# Patient Record
Sex: FEMALE | Race: WHITE | Employment: OTHER | URBAN - METROPOLITAN AREA
[De-identification: names, ages, dates, MRNs, and addresses within clinical notes are randomized per-mention and may not be internally consistent; named-entity substitution may affect disease eponyms.]

---

## 2020-06-14 ENCOUNTER — HOSPITAL ENCOUNTER (OUTPATIENT)
Dept: MAMMOGRAPHY | Age: 60
Discharge: HOME | End: 2020-06-14
Attending: NURSE PRACTITIONER
Payer: COMMERCIAL

## 2020-06-14 DIAGNOSIS — Z12.39 BREAST SCREENING: ICD-10-CM

## 2020-06-14 PROCEDURE — 77067 SCR MAMMO BI INCL CAD: CPT

## 2020-06-18 NOTE — PROGRESS NOTES
NO SPECIFIC MAMMOGRAPHIC EVIDENCE FOR MALIGNANCY. FOLLOW UP BILATERAL SCREENING  MAMMOGRAPHY IS RECOMMENDED IN ONE YEAR.

## 2020-10-30 RX ORDER — SODIUM CHLORIDE 0.9 % (FLUSH) 0.9 %
5-40 SYRINGE (ML) INJECTION EVERY 8 HOURS
Status: CANCELLED | OUTPATIENT
Start: 2020-10-30

## 2020-10-30 RX ORDER — SODIUM CHLORIDE 0.9 % (FLUSH) 0.9 %
5-40 SYRINGE (ML) INJECTION AS NEEDED
Status: CANCELLED | OUTPATIENT
Start: 2020-10-30

## 2020-11-01 ENCOUNTER — ANESTHESIA EVENT (OUTPATIENT)
Dept: SURGERY | Age: 60
End: 2020-11-01
Payer: MEDICARE

## 2020-11-01 PROBLEM — S52.571A OTHER INTRAARTICULAR FRACTURE OF LOWER END OF RIGHT RADIUS, INITIAL ENCOUNTER FOR CLOSED FRACTURE: Status: ACTIVE | Noted: 2020-11-01

## 2020-11-02 ENCOUNTER — HOSPITAL ENCOUNTER (OUTPATIENT)
Age: 60
Setting detail: OUTPATIENT SURGERY
Discharge: HOME | End: 2020-11-02
Attending: ORTHOPAEDIC SURGERY | Admitting: ORTHOPAEDIC SURGERY
Payer: MEDICARE

## 2020-11-02 ENCOUNTER — ANESTHESIA (OUTPATIENT)
Dept: SURGERY | Age: 60
End: 2020-11-02
Payer: MEDICARE

## 2020-11-02 ENCOUNTER — APPOINTMENT (OUTPATIENT)
Dept: GENERAL RADIOLOGY | Age: 60
End: 2020-11-02
Attending: ORTHOPAEDIC SURGERY
Payer: MEDICARE

## 2020-11-02 VITALS
RESPIRATION RATE: 16 BRPM | DIASTOLIC BLOOD PRESSURE: 70 MMHG | HEART RATE: 62 BPM | SYSTOLIC BLOOD PRESSURE: 148 MMHG | WEIGHT: 172 LBS | TEMPERATURE: 98.3 F | BODY MASS INDEX: 32.5 KG/M2 | OXYGEN SATURATION: 95 %

## 2020-11-02 LAB
COVID-19 RAPID TEST, COVR: NOT DETECTED
SOURCE, COVRS: NORMAL

## 2020-11-02 PROCEDURE — 77030000032 HC CUF TRNQT ZIMM -B: Performed by: ORTHOPAEDIC SURGERY

## 2020-11-02 PROCEDURE — A4565 SLINGS: HCPCS | Performed by: ORTHOPAEDIC SURGERY

## 2020-11-02 PROCEDURE — 74011250636 HC RX REV CODE- 250/636: Performed by: STUDENT IN AN ORGANIZED HEALTH CARE EDUCATION/TRAINING PROGRAM

## 2020-11-02 PROCEDURE — 76942 ECHO GUIDE FOR BIOPSY: CPT | Performed by: ORTHOPAEDIC SURGERY

## 2020-11-02 PROCEDURE — 77030033138 HC SUT PGA STRATFX J&J -B: Performed by: ORTHOPAEDIC SURGERY

## 2020-11-02 PROCEDURE — 76060000032 HC ANESTHESIA 0.5 TO 1 HR: Performed by: ORTHOPAEDIC SURGERY

## 2020-11-02 PROCEDURE — 87635 SARS-COV-2 COVID-19 AMP PRB: CPT

## 2020-11-02 PROCEDURE — 74011000250 HC RX REV CODE- 250: Performed by: STUDENT IN AN ORGANIZED HEALTH CARE EDUCATION/TRAINING PROGRAM

## 2020-11-02 PROCEDURE — 77030021122 HC SPLNT MAT FST BSNM -A: Performed by: ORTHOPAEDIC SURGERY

## 2020-11-02 PROCEDURE — 76210000063 HC OR PH I REC FIRST 0.5 HR: Performed by: ORTHOPAEDIC SURGERY

## 2020-11-02 PROCEDURE — 77030010507 HC ADH SKN DERMBND J&J -B: Performed by: ORTHOPAEDIC SURGERY

## 2020-11-02 PROCEDURE — 2709999900 HC NON-CHARGEABLE SUPPLY: Performed by: ORTHOPAEDIC SURGERY

## 2020-11-02 PROCEDURE — 77030002916 HC SUT ETHLN J&J -A: Performed by: ORTHOPAEDIC SURGERY

## 2020-11-02 PROCEDURE — 77030031139 HC SUT VCRL2 J&J -A: Performed by: ORTHOPAEDIC SURGERY

## 2020-11-02 PROCEDURE — 76010010054 HC POST OP PAIN BLOCK: Performed by: ORTHOPAEDIC SURGERY

## 2020-11-02 PROCEDURE — C1713 ANCHOR/SCREW BN/BN,TIS/BN: HCPCS | Performed by: ORTHOPAEDIC SURGERY

## 2020-11-02 PROCEDURE — 76010000160 HC OR TIME 0.5 TO 1 HR INTENSV-TIER 1: Performed by: ORTHOPAEDIC SURGERY

## 2020-11-02 PROCEDURE — 77030000031 HC BIT DRL QC SYNT -C: Performed by: ORTHOPAEDIC SURGERY

## 2020-11-02 PROCEDURE — 73100 X-RAY EXAM OF WRIST: CPT

## 2020-11-02 PROCEDURE — 74011250636 HC RX REV CODE- 250/636

## 2020-11-02 PROCEDURE — 76210000020 HC REC RM PH II FIRST 0.5 HR: Performed by: ORTHOPAEDIC SURGERY

## 2020-11-02 PROCEDURE — 77030019908 HC STETH ESOPH SIMS -A: Performed by: STUDENT IN AN ORGANIZED HEALTH CARE EDUCATION/TRAINING PROGRAM

## 2020-11-02 PROCEDURE — 77030003602 HC NDL NRV BLK BBMI -B: Performed by: STUDENT IN AN ORGANIZED HEALTH CARE EDUCATION/TRAINING PROGRAM

## 2020-11-02 DEVICE — 2.4MM VA LOCKING SCREW STARDRIVE 16MM: Type: IMPLANTABLE DEVICE | Site: RADIUS | Status: FUNCTIONAL

## 2020-11-02 DEVICE — 2.4MM VA LOCKING SCREW STARDRIVE 18MM: Type: IMPLANTABLE DEVICE | Site: RADIUS | Status: FUNCTIONAL

## 2020-11-02 DEVICE — SCREW BNE L12MM DIA24MM CORT S STL ST T8 STARDRV RECESS: Type: IMPLANTABLE DEVICE | Site: RADIUS | Status: FUNCTIONAL

## 2020-11-02 DEVICE — 2.4MM VA-LCP 2-COL DSTL RAD PL NRW 6H HD/3H SHAFT/RT
Type: IMPLANTABLE DEVICE | Site: RADIUS | Status: FUNCTIONAL
Brand: VA-LCP

## 2020-11-02 RX ORDER — IBUPROFEN 200 MG
400 TABLET ORAL
COMMUNITY
End: 2020-12-22

## 2020-11-02 RX ORDER — LIDOCAINE HYDROCHLORIDE 20 MG/ML
INJECTION, SOLUTION EPIDURAL; INFILTRATION; INTRACAUDAL; PERINEURAL AS NEEDED
Status: DISCONTINUED | OUTPATIENT
Start: 2020-11-02 | End: 2020-11-02 | Stop reason: HOSPADM

## 2020-11-02 RX ORDER — SODIUM CHLORIDE, SODIUM LACTATE, POTASSIUM CHLORIDE, CALCIUM CHLORIDE 600; 310; 30; 20 MG/100ML; MG/100ML; MG/100ML; MG/100ML
INJECTION, SOLUTION INTRAVENOUS
Status: DISCONTINUED | OUTPATIENT
Start: 2020-11-02 | End: 2020-11-02 | Stop reason: HOSPADM

## 2020-11-02 RX ORDER — SODIUM CHLORIDE 0.9 % (FLUSH) 0.9 %
5-40 SYRINGE (ML) INJECTION AS NEEDED
Status: DISCONTINUED | OUTPATIENT
Start: 2020-11-02 | End: 2020-11-02 | Stop reason: HOSPADM

## 2020-11-02 RX ORDER — SODIUM CHLORIDE 0.9 % (FLUSH) 0.9 %
5-40 SYRINGE (ML) INJECTION EVERY 8 HOURS
Status: DISCONTINUED | OUTPATIENT
Start: 2020-11-02 | End: 2020-11-02 | Stop reason: HOSPADM

## 2020-11-02 RX ORDER — HYDROMORPHONE HYDROCHLORIDE 2 MG/ML
0.5 INJECTION, SOLUTION INTRAMUSCULAR; INTRAVENOUS; SUBCUTANEOUS
Status: DISCONTINUED | OUTPATIENT
Start: 2020-11-02 | End: 2020-11-02 | Stop reason: HOSPADM

## 2020-11-02 RX ORDER — DEXAMETHASONE SODIUM PHOSPHATE 4 MG/ML
INJECTION, SOLUTION INTRA-ARTICULAR; INTRALESIONAL; INTRAMUSCULAR; INTRAVENOUS; SOFT TISSUE
Status: COMPLETED | OUTPATIENT
Start: 2020-11-02 | End: 2020-11-02

## 2020-11-02 RX ORDER — CEFAZOLIN SODIUM/WATER 2 G/20 ML
2 SYRINGE (ML) INTRAVENOUS ONCE
Status: COMPLETED | OUTPATIENT
Start: 2020-11-02 | End: 2020-11-02

## 2020-11-02 RX ORDER — LIDOCAINE HYDROCHLORIDE 10 MG/ML
0.1 INJECTION INFILTRATION; PERINEURAL AS NEEDED
Status: DISCONTINUED | OUTPATIENT
Start: 2020-11-02 | End: 2020-11-02 | Stop reason: HOSPADM

## 2020-11-02 RX ORDER — NALOXONE HYDROCHLORIDE 0.4 MG/ML
0.1 INJECTION, SOLUTION INTRAMUSCULAR; INTRAVENOUS; SUBCUTANEOUS AS NEEDED
Status: DISCONTINUED | OUTPATIENT
Start: 2020-11-02 | End: 2020-11-02 | Stop reason: HOSPADM

## 2020-11-02 RX ORDER — FLUMAZENIL 0.1 MG/ML
0.2 INJECTION INTRAVENOUS
Status: DISCONTINUED | OUTPATIENT
Start: 2020-11-02 | End: 2020-11-02 | Stop reason: HOSPADM

## 2020-11-02 RX ORDER — NALOXONE HYDROCHLORIDE 0.4 MG/ML
0.1 INJECTION, SOLUTION INTRAMUSCULAR; INTRAVENOUS; SUBCUTANEOUS
Status: DISCONTINUED | OUTPATIENT
Start: 2020-11-02 | End: 2020-11-02 | Stop reason: HOSPADM

## 2020-11-02 RX ORDER — PROPOFOL 10 MG/ML
INJECTION, EMULSION INTRAVENOUS
Status: DISCONTINUED | OUTPATIENT
Start: 2020-11-02 | End: 2020-11-02 | Stop reason: HOSPADM

## 2020-11-02 RX ORDER — DIPHENHYDRAMINE HYDROCHLORIDE 50 MG/ML
12.5 INJECTION, SOLUTION INTRAMUSCULAR; INTRAVENOUS
Status: DISCONTINUED | OUTPATIENT
Start: 2020-11-02 | End: 2020-11-02 | Stop reason: HOSPADM

## 2020-11-02 RX ORDER — SODIUM CHLORIDE, SODIUM LACTATE, POTASSIUM CHLORIDE, CALCIUM CHLORIDE 600; 310; 30; 20 MG/100ML; MG/100ML; MG/100ML; MG/100ML
100 INJECTION, SOLUTION INTRAVENOUS CONTINUOUS
Status: DISCONTINUED | OUTPATIENT
Start: 2020-11-02 | End: 2020-11-02 | Stop reason: HOSPADM

## 2020-11-02 RX ORDER — ROPIVACAINE HYDROCHLORIDE 5 MG/ML
INJECTION, SOLUTION EPIDURAL; INFILTRATION; PERINEURAL
Status: COMPLETED | OUTPATIENT
Start: 2020-11-02 | End: 2020-11-02

## 2020-11-02 RX ORDER — PROPOFOL 10 MG/ML
INJECTION, EMULSION INTRAVENOUS AS NEEDED
Status: DISCONTINUED | OUTPATIENT
Start: 2020-11-02 | End: 2020-11-02 | Stop reason: HOSPADM

## 2020-11-02 RX ORDER — OXYCODONE HYDROCHLORIDE 5 MG/1
5 TABLET ORAL
Status: DISCONTINUED | OUTPATIENT
Start: 2020-11-02 | End: 2020-11-02 | Stop reason: HOSPADM

## 2020-11-02 RX ORDER — FENTANYL CITRATE 50 UG/ML
100 INJECTION, SOLUTION INTRAMUSCULAR; INTRAVENOUS ONCE
Status: COMPLETED | OUTPATIENT
Start: 2020-11-02 | End: 2020-11-02

## 2020-11-02 RX ORDER — MIDAZOLAM HYDROCHLORIDE 1 MG/ML
2 INJECTION, SOLUTION INTRAMUSCULAR; INTRAVENOUS ONCE
Status: COMPLETED | OUTPATIENT
Start: 2020-11-02 | End: 2020-11-02

## 2020-11-02 RX ADMIN — SODIUM CHLORIDE, SODIUM LACTATE, POTASSIUM CHLORIDE, AND CALCIUM CHLORIDE: 600; 310; 30; 20 INJECTION, SOLUTION INTRAVENOUS at 12:43

## 2020-11-02 RX ADMIN — MIDAZOLAM 2 MG: 1 INJECTION INTRAMUSCULAR; INTRAVENOUS at 11:44

## 2020-11-02 RX ADMIN — PROPOFOL 100 MCG/KG/MIN: 10 INJECTION, EMULSION INTRAVENOUS at 12:49

## 2020-11-02 RX ADMIN — LIDOCAINE HYDROCHLORIDE 80 MG: 20 INJECTION, SOLUTION EPIDURAL; INFILTRATION; INTRACAUDAL; PERINEURAL at 12:49

## 2020-11-02 RX ADMIN — FENTANYL CITRATE 50 MCG: 50 INJECTION, SOLUTION INTRAMUSCULAR; INTRAVENOUS at 11:44

## 2020-11-02 RX ADMIN — DEXAMETHASONE SODIUM PHOSPHATE 4 MG: 4 INJECTION, SOLUTION INTRAMUSCULAR; INTRAVENOUS at 11:48

## 2020-11-02 RX ADMIN — ROPIVACAINE HYDROCHLORIDE 30 ML: 150 INJECTION, SOLUTION EPIDURAL; INFILTRATION; PERINEURAL at 11:48

## 2020-11-02 RX ADMIN — Medication 2 G: at 12:50

## 2020-11-02 RX ADMIN — PROPOFOL 50 MG: 10 INJECTION, EMULSION INTRAVENOUS at 12:49

## 2020-11-02 RX ADMIN — SODIUM CHLORIDE, SODIUM LACTATE, POTASSIUM CHLORIDE, AND CALCIUM CHLORIDE 100 ML/HR: 600; 310; 30; 20 INJECTION, SOLUTION INTRAVENOUS at 11:45

## 2020-11-02 NOTE — H&P
History and Physical    Subjective: Lupe Alvarado is a 60 y.o. sustained a right displaced distal radius fracture last week, she was seen by my partner Dr Jaime Felder who recommended surgery but referred her to me for the surgery. Patient discussed risks and benefits of surgery with me over the phone and elected to proceed with surgery. Past Medical History:   Diagnosis Date    Arthritis     back, shoulders, hand    Depression     anxiety    GERD (gastroesophageal reflux disease)     medications prn    History of degenerative disc disease       Past Surgical History:   Procedure Laterality Date    HX CHOLECYSTECTOMY      HX COLONOSCOPY      HX GASTRIC BYPASS  2012    HX HERNIA REPAIR  2016 August Thursday 19 2016;     HX ORTHOPAEDIC Left     shoulder surgery     HX ORTHOPAEDIC Right     shoulder surgery x 3    HX OTHER SURGICAL      patient reports spinal ablation      Family History   Problem Relation Age of Onset    Diabetes Mother     Stroke Mother     Breast Cancer Mother 72    Diabetes Father     Stroke Father     Pulmonary Embolism Sister     Breast Cancer Maternal Grandmother 76    Breast Cancer Maternal Aunt 79    Breast Cancer Other       Social History     Tobacco Use    Smoking status: Never Smoker    Smokeless tobacco: Never Used    Tobacco comment: 30 years ago   Substance Use Topics    Alcohol use: No       Prior to Admission medications    Medication Sig Start Date End Date Taking? Authorizing Provider   montelukast (SINGULAIR) 10 mg tablet Take 1 Tab by mouth daily. 9/22/20   Chica Sims NP   citalopram (CELEXA) 20 mg tablet Take 1 Tab by mouth daily. Patient taking differently: Take 20 mg by mouth every evening. 9/22/20   Chica Sims NP   meloxicam (MOBIC) 15 mg tablet Take 1 Tab by mouth daily. 7/2/20   Chica Sims NP   fluticasone propionate (FLONASE) 50 mcg/actuation nasal spray 2 Sprays by Both Nostrils route daily.  7/2/20 Chica Sims NP   topiramate (TOPAMAX) 25 mg tablet 1 tab po bid  Patient taking differently: 25 mg nightly. 1 tab po bid 7/2/20   Chica Sims NP   ondansetron hcl (ZOFRAN) 4 mg tablet Take 1 Tab by mouth every eight (8) hours as needed for Nausea or Nausea or Vomiting. 7/2/20   Chica Sims NP   albuterol (PROVENTIL HFA, VENTOLIN HFA, PROAIR HFA) 90 mcg/actuation inhaler Take 1 Puff by inhalation every six (6) hours as needed for Wheezing. 2/18/20   Chica Sims NP   albuterol (PROVENTIL VENTOLIN) 2.5 mg /3 mL (0.083 %) nebu 3 mL by Nebulization route every six (6) hours as needed for Wheezing. 2/18/20   Chica Sims NP   tiZANidine (ZANAFLEX) 4 mg tablet Take 4 mg by mouth. Just for ablation 10/16/19   Provider, Historical   omeprazole (PRILOSEC) 20 mg capsule Take 1 Cap by mouth daily. 12/4/19   Chica Sims NP   budesonide-formoterol (SYMBICORT) 80-4.5 mcg/actuation HFAA Take 2 Puffs by inhalation two (2) times a day. 12/4/19   Chica Sims NP   albuterol (PROVENTIL HFA, VENTOLIN HFA, PROAIR HFA) 90 mcg/actuation inhaler Take 2 Puffs by inhalation every six (6) hours as needed for Wheezing. 12/4/19   Chica Sims NP   metFORMIN (GLUCOPHAGE) 500 mg tablet Take 1 Tab by mouth daily (with breakfast). Patient taking differently: Take 500 mg by mouth daily (with breakfast). Only takes for an lumbar and thoracic ablation. Takes 1 week before and 1 week after 12/4/19   Chica Sims NP   gabapentin (NEURONTIN) 300 mg capsule Take 300 mg by mouth daily. Provider, Historical   HYDROcodone-acetaminophen (NORCO)  mg tablet Take 1 Tab by mouth every six (6) hours as needed for Pain. Provider, Historical   Nebulizer & Compressor machine Pt can use 4 times a day as needed. 5/29/18   Gabe Sims, HITESH   Nebulizer Accessories kit Pt can use 4 times daily as needed.  5/29/18   Gabe Sims, NP cholecalciferol (VITAMIN D3) 1,000 unit tablet Take 1,000 Units by mouth daily. Provider, Historical   miscellaneous medical supply misc Replacement of TENS unit pads as needed. 1/15/16   Provider, Historical   multivitamin with iron tablet Take 1 Tab by mouth. 6/12/15   Provider, Historical   thiamine (B-1) 100 mg tablet Take 100 mg by mouth. Provider, Historical     Allergies   Allergen Reactions    Tylox [Oxycodone-Acetaminophen] Rash        Review of Systems:  A comprehensive review of systems was negative except for that written in the History of Present Illness. Objective: Intake and Output:    No intake/output data recorded. No intake/output data recorded. Physical Exam:   NAD, heart with RRR, lungs clear to auscultation    Right wrist with normal cap refill, normal sensation in all nerve distributions, moderate swelling and limited motion due to pain      Data Review:   No results found for this or any previous visit (from the past 24 hour(s)). \Imaging: Right distal radius intraarticular fracture with dorsal tilt of 20 degrees.     Assessment:     Active Problems:    Other intraarticular fracture of lower end of right radius, initial encounter for closed fracture (11/1/2020)        Plan:     Plan for right distal radius open reduction internal fixation    Wanda Beckett MD, PhD  Orthopaedic Surgery  11/02/20  12:32 PM

## 2020-11-02 NOTE — OP NOTES
OPERATIVE NOTE    Sadia Briggs   [id number]  11/2/2020    PRE-OP DIAGNOSIS: Oth intartic fracture of lower end of right radius, init [S52.571A]       POST-OP DIAGNOSIS: Oth intartic fracture of lower end of right radius, init [S52.571A]    LATERALITY: Right    PROCEDURES PERFORMED:    Open treatment of Right intra-articular distal radius fracture with internal fixation of two fragments CPT 37881      SURGEON:  Yaneth Craft MD    IMPLANTS:  Implant Name Type Inv. Item Serial No.  Lot No. LRB No. Used Action   PLATE RAD DSTL 2-C 6H HD/3H RT -- NRW [employer] - [id number]  PLATE RAD DSTL 2-C 6H HD/3H RT -- [address] [id number] Right 1 Implanted   SCR VA LCK ST STRDRV 2.4X18MM --  - [id number]  SCR VA LCK ST STRDRV 2.4X18MM --   [address] [id number] Right 3 Implanted   SCR BNE CRTX ST T8 2.4X12MM SS --  - [id number]  SCR BNE CRTX ST T8 2.4X12MM SS --   [employer] [place] [id number] Right 3 Implanted   SCR VA LCK ST STRDRV 2.4X16MM --  - [id number]  SCR VA LCK ST STRDRV 2.4X16MM --   [address] [id number] Right 2 Implanted       Procedure(s):  RIGHT DISTAL RADIUS OPEN REDUCTION INTERNAL FIXATION PLEXUS    Surgeon(s):  Juanito Coy MD    Procedure(s):  RIGHT DISTAL RADIUS OPEN REDUCTION INTERNAL FIXATION PLEXUS    ANESTHESIA: Regional    ESTIMATED BLOOD LOSS: Minimal    COMPLICATIONS: None    TOURNIQUET TIME:   Total Tourniquet Time Documented:  Upper Arm (Right) - 35 minutes  Total: Upper Arm (Right) - 35 minutes      INDICATION FOR PROCEDURE:  Sadia Briggs sustained the above mentioned injuries as a result of a fall. Surgical and non-surgical treatment options were discussed with the patient and their family, as well as the risk and benefits of each option. Together, we decided to proceed with surgical management of their fracture.   Specific to this treatment plan, we discussed in detail surgical risks including scar, pain, bleeding, infection, anesthetic risks, neurovascular injury, nonunion, malunion, hardware loosening or failure, need for further surgery,  weakness, stiffness, risk of death and potential risk of other unforseen complication. The patient did consent to the procedure after discussion of the risks and benefits. DESCRIPTION OF PROCEDURE:  The patient was identified in the holding room. The Right wrist was marked and confirmed as the correct operative site. They were then brought to the OR and general anesthesia was induced. They were transferred onto the OR table in the supine position. All bony prominences were well padded. SCDs were placed on the bilateral legs throughout the case. A timeout was performed, verifying the correct patient, the correct side being the Right side and the correct procedure. Antibiotics were then administered, and were redosed during the procedure as needed at indicated intervals. A non-sterile tourniquet was placed on the Right arm    The Right upper extremity and shoulder girdle was pre scrubbed and then prepped and draped in routine sterile fashion. An incisional timeout was performed re-confirming the correct patient, surgical site and procedure, as well as verifying antibiotics. A volar FCR approach was utilized. Dissection was taken down through skin and subcutaneous tissue. Hemostasis was achieved with bipolar cautery. The FCR sheath was incised and it was retracted ulnarly. The FCR subsheath was incised. The pronator quadratus was then sharply released from the radius in an L-shaped fashion and was retracted ulnarly to expose the distal radius. Dissection was taken down to bone. The fracture was identified and inspected. Fracture was reduced with manipulation and placement of a [place] elevator into the fracture to unhinge the volar cortex. Reduction was confirmed with fluoroscopy. A distal radius plate was chosen and placed along the volar surface.   Once plate position was confirmed, the plate was secured to the bone with a nonlocking screw into the oblong hole. The distal row of screw holes were filled with locking screws of measured length after unicortical drilling. The plate was secured at the shaft with two additional screws, one more nonlocking and one locking screw. AP and lateral x-rays were obtained, showing adequate fracture reduction with restoration of height, palmar tilt and radial inclination. Articular wrist view was used after placement of each distal screw to demonstrated that all screws were outside of the joint. Final x-rays were obtained. These showed adequate reduction of the fracture, as well as adequate plate and screw placement and length. The tourniquet was deflated and hemostasis was ensured. The wounds were then thoroughly irrigated and closed in layered fashion with 4-0 monocryl and 4-0 stratafix. A sterile dressing was applied followed by a Volar splint    The patient was awakened and taken to PACU in stable condition. All sponge and needle counts were correct at the end of the case. I was present and scrubbed for the entire procedure.        DISPOSITION : Home    MECHANICAL VTE (DVT) PROPHYLAXIS: sequential compression devices    CHEMICAL VTE (DVT) PROPHYLAXIS: None warranted for this case    WEIGHT BEARING STATUS:   NWB on the Right upper extremity    FOLLOW-UP: in 10-14 days for suture removal.       [place] MD, PhD  Orthopaedic Surgery  11/02/20  1:36 PM

## 2020-11-02 NOTE — ANESTHESIA PROCEDURE NOTES
Peripheral Block    Start time: 11/2/2020 11:42 AM  End time: 11/2/2020 11:48 AM  Performed by: Bogdan Tong MD  Authorized by: Bogdan Tong MD       Pre-procedure:    Indications: at surgeon's request and post-op pain management    Preanesthetic Checklist: patient identified, risks and benefits discussed, site marked, timeout performed, anesthesia consent given and patient being monitored    Timeout Time: 11:42          Block Type:   Block Type:  Supraclavicular  Laterality:  Right  Monitoring:  Standard ASA monitoring, responsive to questions, oxygen, continuous pulse ox, frequent vital sign checks and heart rate  Injection Technique:  Single shot  Procedures: ultrasound guided    Patient Position: supine  Prep: chlorhexidine    Location:  Supraclavicular  Needle Type:  Stimuplex  Needle Gauge:  20 G  Needle Localization:  Ultrasound guidance  Medication Injected:  Ropivacaine (PF) (NAROPIN)(0.5%) 5 mg/mL injection, 30 mL  dexamethasone (DECADRON) 4 mg/mL injection, 4 mg  Med Admin Time: 11/2/2020 11:48 AM    Assessment:  Number of attempts:  1  Injection Assessment:  Incremental injection every 5 mL, negative aspiration for CSF, no paresthesia, ultrasound image on chart, local visualized surrounding nerve on ultrasound, negative aspiration for blood and no intravascular symptoms  Patient tolerance:  Patient tolerated the procedure well with no immediate complications

## 2020-11-02 NOTE — DISCHARGE INSTRUCTIONS
Postoperative  Instructions:      Weightbearing or Lifting: You  are  not  allowed  to  lift  any  weight  or  bear  any  weight  on  the  surgical  extremity  until  cleared  by  your  surgeon. Dressing  instructions:    Keep  your  dressing  and/or  splint  clean  and  dry  at  all  times. It  will  be  removed  at  your  first  post-operative  appointment. Your  stitches  will  be  removed  at  this  visit. Showering  Instructions:  May  shower  But keep surgical dressing clean and dry until removed as explained above. Pain  Control:  - You  have  been  given  a  prescription  to  be  taken  as  directed  for  post-operative  pain  control. In  addition,  elevate  the  operative  extremity  above  the  heart  at  all  times  to  prevent  swelling  and  throbbing  pain. - If you develop constipation while taking narcotic pain medications (Norco, Hydrocodone, Percocet, Oxycodone, Dilaudid, Hydromorphone) take  over-the-counter  Colace,  100mg  by  mouth  twice  a  Day. - Nausea  is  a  common  side  effect  of  many  pain  medications. You  will  want  to  eat something  before  taking  your  pain  medicine  to  help  prevent  Nausea. - If  you  are  taking  a  prescription  pain  medication  that  contains  acetaminophen,  we  recommend  that  you  do  not  take  additional  over  the  counter  acetaminophen  (Tylenol®). Other  pain  relieving  options:   - Using  a  cold  pack  to  ice  the  affected  area  a  few  times  a  day  (15  to  20  minutes  at  a  time)  can  help  to  relieve  pain,  reduce  swelling  and  bruising.      - Elevation  of  the  affected  area  can  also  help  to  reduce  pain  and  swelling. Did  you  receive  a  nerve  Block? A  nerve  block  can  provide  pain  relief  for  one  hour  to  two  days  after  your  surgery.   As  long  as  the  nerve  block  is  working,  you  will  experience  little  or  no  sensation  in  the  area  the surgeon  operated  on. As  the  nerve  block  wears  off,  you  will  begin  to  experience  pain  or  discomfort. It  is  very  important  that  you  begin  taking  your  prescribed  pain  medication  before  the  nerve  block  fully  wears  off. The first sign that the nerve block is wearing off is tingling in your fingers. Treating  your  pain  at  the  first  sign  of  the  block  wearing  off  will  ensure  your  pain  is  better  controlled  and  more  tolerable  when  full-sensation  returns. Do  not  wait  until  the  pain  is  intolerable,  as  the  medicine  will  be  less  effective. It  is  better  to  treat  pain  in  advance  than  to  try  and  catch  up. General  Anesthesia or Sedation:      If  you  did  not  receive  a  nerve  block  during  your  surgery,  you  will  need  to  start  taking  your  pain  medication  shortly  after  your  surgery  and  should  continue  to  do  so  as  prescribed  by  your  surgeon. Please  call  [phone]  with any concern and ask to speak with Curtis Lehman. Concerning problems include:      -  Excessive  redness  of  the  incisions      -  Drainage  for  more  than  2  Days after surgery or any foul smelling drainage  -  Fever  of  more  than  101.5  F      Please  call  [phone]  if  you  do  not  receive  or  are  unsure  of  your  first  follow-up  appointment. You  should  see  the  doctor  10-14  days  after  your  Surgery. Thank you for choosing me and [address] for your care. I will go above and beyond to ensure you receive the best care possible. Ridge Fall MD, PhD        Harsha Tai Call your doctor if pain is NOT relieved by medication.      Excessive bleeding that does not stop after holding pressure over the area  · Temperature of 101 degrees F or above  · Excessive redness, swelling or bruising, and/ or green or yellow, smelly discharge from incision      TYPICAL SIDE EFFECTS OF PAIN MEDICATION:     Constipation: Drink lots of fluids. Over the counter stool softener if needed.    Nausea: Take pain medication with food. Call your doctor with persistent nausea. ACTIVITY  · As tolerated and as directed by your doctor. · Bathe or shower as directed by your doctor. DIET  · Day of surgery: Clear liquids until no nausea or vomiting; small portion, light diet [employer] foods (ex: baked chicken, plain rice, grits, scrambled eggs, toast). Nothing greasy, fried or spicy today. · Advance to regular diet on second day, unless your doctor orders otherwise. · If nausea and vomiting continues, call your doctor. PAIN  · Take pain medication as directed by your doctor. · DO NOT take aspirin or blood thinners unless directed by your doctor. AFTER ANESTHESIA   · For the first 24 hours: DO NOT Drive, Drink alcoholic beverages, or Make important decisions. · Be aware of dizziness following anesthesia and while taking pain medication. DISCHARGE SUMMARY from Nurse    PATIENT INSTRUCTIONS:    After general anesthesia or intravenous sedation, for 24 hours or while taking prescription Narcotics:  · Limit your activities  · Do not drive and operate hazardous machinery  · Do not make important personal or business decisions  · Do  not drink alcoholic beverages  · If you have not urinated within 8 hours after discharge, please contact your surgeon on call. *  Please give a list of your current medications to your Primary Care Provider. *  Please update this list whenever your medications are discontinued, doses are      changed, or new medications (including over-the-counter products) are added. *  Please carry medication information at all times in case of emergency situations. Preventing Infection at Home  We care about preventing infection and avoiding the spread of germs - not only when you are in the hospital but also when you return home.  When you return home from the hospital, its important to take the following steps to help prevent infection and avoid spreading germs that could infect you and others. Ask everyone in your home to follow these guidelines, too. Clean Your Hands  · Clean your hands whenever your hands are visibly dirty, before you eat, before or after touching your mouth, nose or eyes, and before preparing food. Clean them after contact with body fluids, using the restroom, touching animals or changing diapers. · When washing hands, wet them with warm water and work up a lather. Rub hands for at least 15 seconds, then rinse them and pat them dry with a clean towel or paper towel. · When using hand sanitizers, it should take about 15 seconds to rub your hands dry. If not, you probably didnt apply enough . Cover Your Sneeze or Cough  Germs are released into the air whenever you sneeze or cough. To prevent the spread of infection:  · Turn away from other people before coughing or sneezing. · Cover your mouth or nose with a tissue when you cough or sneeze. Put the tissue in the trash. · If you dont have a tissue, cough or sneeze into your upper sleeve, not your hands. · Always clean your hands after coughing or sneezing. Care for Wounds  Your skin is your bodys first line of defense against germs, but an open wound leaves an easy way for germs to enter your body. To prevent infection:  · Clean your hands before and after changing wound dressings, and wear gloves to change dressings if recommended by your doctor. · Take special care with IV lines or other devices inserted into the body. If you must touch them, clean your hands first.  · Follow any specific instructions from your doctor to care for your wounds. Contact your doctor if you experience any signs of infection, such as fever or increased redness at the surgical or wound site.     Keep a Clean Home  · Clean or wipe commonly touched hard surfaces like door handles, sinks, tabletops, phones and TV remotes. · Use products labeled disinfectant to kill harmful bacteria and viruses. · Use a clean cloth or paper towel to clean and dry surfaces. Wiping surfaces with a dirty dishcloth, sponge or towel will only spread germs. · Never share toothbrushes, laguerre, drinking glasses, utensils, razor blades, face cloths or bath towels to avoid spreading germs. · Be sure that the linens that you sleep on are clean. · Keep pets away from wounds and wash your hands after touching pets, their toys or bedding. We care about you and your health. Remember, preventing infections is a team effort between you, your family, friends and health care providers. These are general instructions for a healthy lifestyle:    No smoking/ No tobacco products/ Avoid exposure to second hand smoke    Surgeon General's Warning:  Quitting smoking now greatly reduces serious risk to your health. Obesity, smoking, and sedentary lifestyle greatly increases your risk for illness    A healthy diet, regular physical exercise & weight monitoring are important for maintaining a healthy lifestyle    You may be retaining fluid if you have a history of heart failure or if you experience any of the following symptoms:  Weight gain of 3 pounds or more overnight or 5 pounds in a week, increased swelling in our hands or feet or shortness of breath while lying flat in bed. Please call your doctor as soon as you notice any of these symptoms; do not wait until your next office visit. Recognize signs and symptoms of STROKE:    F-face looks uneven    A-arms unable to move or move unevenly    S-speech slurred or non-existent    T-time-call 911 as soon as signs and symptoms begin-DO NOT go       Back to bed or wait to see if you get better-TIME IS BRAIN.     Advance Care Planning  People with COVID-19 may have no symptoms, mild symptoms, such as fever, cough, and shortness of breath or they may have more severe illness, developing severe and fatal pneumonia. As a result, Advance Care Planning with attention to naming a health care decision maker (someone you trust to make healthcare decisions for you if you could not speak for yourself) and sharing other health care preferences is important BEFORE a possible health crisis. Please contact your Primary Care Provider to discuss Advance Care Planning. Preventing the Spread of Coronavirus Disease 2019 in Homes and Residential Communities  For the most recent information go to [email or web address]    Prevention steps for People with confirmed or suspected COVID-19 (including persons under investigation) who do not need to be hospitalized  and   People with confirmed COVID-19 who were hospitalized and determined to be medically stable to go home    Your healthcare provider and public health staff will evaluate whether you can be cared for at home. If it is determined that you do not need to be hospitalized and can be isolated at home, you will be monitored by staff from your local or state health department. You should follow the prevention steps below until a healthcare provider or local or state health department says you can return to your normal activities. Stay home except to get medical care  People who are mildly ill with COVID-19 are able to isolate at home during their illness. You should restrict activities outside your home, except for getting medical care. Do not go to work, school, or public areas. Avoid using public transportation, ride-sharing, or taxis. Separate yourself from other people and animals in your home  People: As much as possible, you should stay in a specific room and away from other people in your home. Also, you should use a separate bathroom, if available. Animals:  You should restrict contact with pets and other animals while you are sick with COVID-19, just like you would around other people. Although there have not been reports of pets or other animals becoming sick with COVID-19, it is still recommended that people sick with COVID-19 limit contact with animals until more information is known about the virus. When possible, have another member of your household care for your animals while you are sick. If you are sick with COVID-19, avoid contact with your pet, including petting, snuggling, being kissed or licked, and sharing food. If you must care for your pet or be around animals while you are sick, wash your hands before and after you interact with pets and wear a facemask. Call ahead before visiting your doctor  If you have a medical appointment, call the healthcare provider and tell them that you have or may have COVID-19. This will help the healthcare providers office take steps to keep other people from getting infected or exposed. Wear a facemask  You should wear a facemask when you are around other people (e.g., sharing a room or vehicle) or pets and before you enter a healthcare providers office. If you are not able to wear a facemask (for example, because it causes trouble breathing), then people who live with you should not stay in the same room with you, or they should wear a facemask if they enter your room. Cover your coughs and sneezes  Cover your mouth and nose with a tissue when you cough or sneeze. Throw used tissues in a lined trash can. Immediately wash your hands with soap and water for at least 20 seconds or, if soap and water are not available, clean your hands with an alcohol-based hand  that contains at least 60% alcohol. Clean your hands often  Wash your hands often with soap and water for at least 20 seconds, especially after blowing your nose, coughing, or sneezing; going to the bathroom; and before eating or preparing food.  If soap and water are not readily available, use an alcohol-based hand  with at least 60% alcohol, covering all surfaces of your hands and rubbing them together until they feel dry. Soap and water are the best option if hands are visibly dirty. Avoid touching your eyes, nose, and mouth with unwashed hands. Avoid sharing personal household items  You should not share dishes, drinking glasses, cups, eating utensils, towels, or bedding with other people or pets in your home. After using these items, they should be washed thoroughly with soap and water. Clean all high-touch surfaces everyday  High touch surfaces include counters, tabletops, doorknobs, bathroom fixtures, toilets, phones, keyboards, tablets, and bedside tables. Also, clean any surfaces that may have blood, stool, or body fluids on them. Use a household cleaning spray or wipe, according to the label instructions. Labels contain instructions for safe and effective use of the cleaning product including precautions you should take when applying the product, such as wearing gloves and making sure you have good ventilation during use of the product. Monitor your symptoms  Seek prompt medical attention if your illness is worsening (e.g., difficulty breathing). Before seeking care, call your healthcare provider and tell them that you have, or are being evaluated for, COVID-19. Put on a facemask before you enter the facility. These steps will help the healthcare providers office to keep other people in the office or waiting room from getting infected or exposed. Ask your healthcare provider to call the local or state health department. Persons who are placed under active monitoring or facilitated self-monitoring should follow instructions provided by their local health department or occupational health professionals, as appropriate. When working with your local health department check their available hours. If you have a medical emergency and need to call 911, notify the dispatch personnel that you have, or are being evaluated for COVID-19.  If possible, put on a facemask before emergency medical services arrive. Discontinuing home isolation  Patients with confirmed COVID-19 should remain under home isolation precautions until the risk of secondary transmission to others is thought to be low. The decision to discontinue home isolation precautions should be made on a case-by-case basis, in consultation with healthcare providers and state and local health departments.

## 2020-11-02 NOTE — ANESTHESIA PREPROCEDURE EVALUATION
Relevant Problems   No relevant active problems       Anesthetic History   No history of anesthetic complications            Review of Systems / Medical History  Patient summary reviewed, nursing notes reviewed and pertinent labs reviewed    Pulmonary  Within defined limits                 Neuro/Psych         Psychiatric history     Cardiovascular  Within defined limits                Exercise tolerance: >4 METS     GI/Hepatic/Renal     GERD: well controlled           Endo/Other        Obesity and arthritis    Comments: S/p gastric bypass. Down 94 pounds.  Other Findings              Physical Exam    Airway  Mallampati: II  TM Distance: 4 - 6 cm  Neck ROM: normal range of motion   Mouth opening: Normal     Cardiovascular  Regular rate and rhythm,  S1 and S2 normal,  no murmur, click, rub, or gallop             Dental    Dentition: Upper partial plate     Pulmonary  Breath sounds clear to auscultation               Abdominal         Other Findings            Anesthetic Plan    ASA: 2  Anesthesia type: total IV anesthesia      Post-op pain plan if not by surgeon: peripheral nerve block single    Induction: Intravenous  Anesthetic plan and risks discussed with: Patient and Family

## 2020-11-02 NOTE — ANESTHESIA POSTPROCEDURE EVALUATION
Procedure(s):  RIGHT DISTAL RADIUS OPEN REDUCTION INTERNAL FIXATION PLEXUS.     general    Anesthesia Post Evaluation      Multimodal analgesia: multimodal analgesia used between 6 hours prior to anesthesia start to PACU discharge  Patient location during evaluation: PACU  Patient participation: complete - patient participated  Level of consciousness: awake and alert  Pain management: adequate  Airway patency: patent  Anesthetic complications: no  Cardiovascular status: acceptable  Respiratory status: acceptable  Hydration status: acceptable  Post anesthesia nausea and vomiting:  controlled  Final Post Anesthesia Temperature Assessment:  Normothermia (36.0-37.5 degrees C)      INITIAL Post-op Vital signs:   Vitals Value Taken Time   /66 11/2/2020  1:47 PM   Temp 36.5 °C (97.7 °F) 11/2/2020  1:37 PM   Pulse 59 11/2/2020  1:47 PM   Resp 14 11/2/2020  1:47 PM   SpO2 94 % 11/2/2020  1:47 PM

## 2020-11-30 ENCOUNTER — HOSPITAL ENCOUNTER (OUTPATIENT)
Dept: PHYSICAL THERAPY | Age: 60
Discharge: HOME | End: 2020-11-30
Payer: MEDICARE

## 2020-11-30 PROCEDURE — 97165 OT EVAL LOW COMPLEX 30 MIN: CPT

## 2020-11-30 PROCEDURE — 97110 THERAPEUTIC EXERCISES: CPT

## 2020-11-30 NOTE — THERAPY EVALUATION
Pierre Drew  : 1960  Primary: Yasmine Isbell Medicare Advantage  Secondary:  [address] [place]  [facility], [address]  [phone]   [phone]       OUTPATIENT OCCUPATIONAL [place] Assessment 2020   ICD-10: Treatment Diagnosis: Pain in right wrist (M25.531)    Precautions/Allergies:   Tylox [oxycodone-acetaminophen]   TREATMENT PLAN:  Effective Dates: 2020 TO 2021 (90 days). Frequency/Duration: 1-2 times a week for 90 Day(s) MEDICAL/REFERRING DIAGNOSIS:  Other intraarticular fracture of lower end of right radius, initial encounter for closed fracture [S52.571A]   DATE OF ONSET: 10/29/20  REFERRING PHYSICIAN: Rui Kaur MD MD Orders: OT evaluate and treat. Return MD Appointment: Pending. INITIAL ASSESSMENT:   Ms. Drew presents with decreased functional use, strength and range of motion of her right hand, wrist and upper extremity that is affecting her independence with activities of daily living and ability to perform job tasks. I feel that Ms. Drew will benefit from skilled occupational therapy to maximize the functional use of her hand, wrist and upper extremity in daily activities and work tasks. PROBLEM LIST (Impacting functional limitations):  1. Pain in R wrist.  2. Decreased motion in R wrist/hand/forearm. 3. Decreased strength in R wrist/hand/forearm. INTERVENTIONS PLANNED: (Treatment may consist of any combination of the following)  1. Modalities as warranted  2. Therapeutic exercise including a home exercise program.  3. Manual therapy. 4. Therapeutic activities. 5. Orthotic management and training as warranted. GOALS: (Goals have been discussed and agreed upon with patient.)  Short-Term Functional Goals: Time Frame: 4 weeks  1. Decrease pain to moderate or less per Quick-DASH to allow patient to perform self care tasks.   2. Increase motion in R wrist extension flexion by 30 degrees to improve functional use of upper extremity in ADL activities. 3. Increase motion in fingers to full composite flexion to allow patient to  and lift objects during self care activities. Discharge Goals: Time Frame: 12 weeks  1. Decrease pain to mild or less per Quick-DASH to allow patient to perform all household and work tasks. 2. Increase motion in R wrist extension flexion by 60 degreees to allow patient to perform all ADL activities. 3. Increase strength in R  by 30 pounds to allow patient to , lift, hold, and carry objects. OUTCOME MEASURE:   Tool Used: Disabilities of the Arm, Shoulder and Hand (DASH) Questionnaire - Quick Version  Score:  Initial: 50/55  Most Recent: X/55 (Date: -- )   Interpretation of Score: The DASH is designed to measure the activities of daily living in person's with upper extremity dysfunction or pain. Each section is scored on a 1-5 scale, 5 representing the greatest disability. The scores of each section are added together for a total score of 55. MEDICAL NECESSITY:   · Patient demonstrates good rehab potential due to higher previous functional level. REASON FOR SERVICES/OTHER COMMENTS:  · Patient continues to require skilled intervention due to s/p R distal radius fracture with stiffness/pain limiting overall functional use of R hand/wrist/UE for ADL. Total Duration:  OT Patient Time In/Time Out  Time In: 0800  Time Out: 0845    Rehabilitation Potential For Stated Goals: Good  Regarding [facility] therapy, I certify that the treatment plan above will be carried out by a therapist or under their direction.   Thank you for this referral,  Nighat Pedraza, OTR/L     Referring Physician Signature: Francia Chou MD                 PAIN/SUBJECTIVE:   Initial: Pain Intensity 1: (\"severe\" per Haroldo Alonso)   Post Session:  No rating given/10   OCCUPATIONAL PROFILE & HISTORY:   History of Injury/Illness (Reason for Referral): Fang Maldonado states she fell sustaining a distal radius fracture on 10/29 after bumping her head against the hatchback of her vehicle as she was picking something up then falling and is s/p distal radius ORIF by Dr. Santa Litten on 11/2/20. Patient states she has 7/10 pain currently. She states if she holds her arm in the \"wrong\" position she feels numbness in her index, middle and ring fingers. She is wearing a wrist cock-up orthosis and a sling for \"comfort\" as her hand has pain when it is in a dependent position. Past Medical History/Comorbidities: Patient states has knee troubles - has fallen about 3-4 times per patient. Ms. Carol Ackerman  has a past medical history of Arthritis, Depression, GERD (gastroesophageal reflux disease), and History of degenerative disc disease. She also has no past medical history of Difficult intubation, Malignant hyperthermia due to anesthesia, Nausea & vomiting, or Pseudocholinesterase deficiency. Ms. Carol Ackerman  has a past surgical history that includes hx hernia repair (2016); hx gastric bypass (2012); hx orthopaedic (Left); hx orthopaedic (Right); hx colonoscopy; hx cholecystectomy; and hx other surgical.  Social History/Living Environment:     Lives with . Grandmother of 3 little children she assists regularly. Prior Level of Function/Work/Activity:  Disabled. Dominant Side:         RIGHT  Previous Treatment Approaches:          Outpatient PT following R/L rotator cuff surgeries. Ambulatory/Rehab Services H2 Model Falls Risk Assessment   Risk Factors:       (5)  History of Recent Falls [w/in 3 months] Ability to Rise from Chair:       (0)  Ability to rise in a single movement   Falls Prevention Plan:       No modifications necessary   Total: (5 or greater = High Risk): 5   ©2010 [facility]. All Rights Reserved. [facility] Patent #0,[id number].  Federal Law prohibits the replication, distribution or use without written permission from [facility]   Current Medications:       Current Outpatient Medications:     ibuprofen (AdviL) 200 mg tablet, Take 400 mg by mouth., Disp: , Rfl:     montelukast (SINGULAIR) 10 mg tablet, Take 1 Tab by mouth daily. , Disp: 90 Tab, Rfl: 0    citalopram (CELEXA) 20 mg tablet, Take 1 Tab by mouth daily. (Patient taking differently: Take 20 mg by mouth every evening.), Disp: 90 Tab, Rfl: 0    meloxicam (MOBIC) 15 mg tablet, Take 1 Tab by mouth daily. , Disp: 30 Tab, Rfl: 2    fluticasone propionate (FLONASE) 50 mcg/actuation nasal spray, 2 Sprays by Both Nostrils route daily. , Disp: 1 Bottle, Rfl: 2    topiramate (TOPAMAX) 25 mg tablet, 1 tab po bid (Patient taking differently: 25 mg nightly. 1 tab po bid), Disp: 60 Tab, Rfl: 2    ondansetron hcl (ZOFRAN) 4 mg tablet, Take 1 Tab by mouth every eight (8) hours as needed for Nausea or Nausea or Vomiting., Disp: 10 Tab, Rfl: 0    albuterol (PROVENTIL VENTOLIN) 2.5 mg /3 mL (0.083 %) nebu, 3 mL by Nebulization route every six (6) hours as needed for Wheezing., Disp: 30 Nebule, Rfl: 1    tiZANidine (ZANAFLEX) 4 mg tablet, Take 4 mg by mouth. Just for ablation, Disp: , Rfl:     omeprazole (PRILOSEC) 20 mg capsule, Take 1 Cap by mouth daily. , Disp: 30 Cap, Rfl: 3    budesonide-formoterol (SYMBICORT) 80-4.5 mcg/actuation HFAA, Take 2 Puffs by inhalation two (2) times a day., Disp: 1 Inhaler, Rfl: 2    albuterol (PROVENTIL HFA, VENTOLIN HFA, PROAIR HFA) 90 mcg/actuation inhaler, Take 2 Puffs by inhalation every six (6) hours as needed for Wheezing., Disp: 1 Inhaler, Rfl: 2    metFORMIN (GLUCOPHAGE) 500 mg tablet, Take 1 Tab by mouth daily (with breakfast). (Patient taking differently: Take 500 mg by mouth daily (with breakfast). Only takes for an lumbar and thoracic ablation. Takes 1 week before and 1 week after), Disp: 30 Tab, Rfl: 2    gabapentin (NEURONTIN) 300 mg capsule, Take 300 mg by mouth daily. , Disp: , Rfl:     HYDROcodone-acetaminophen ([address])  mg tablet, Take 1 Tab by mouth every six (6) hours as needed for Pain., Disp: , Rfl:     Nebulizer & Compressor machine, Pt can use 4 times a day as needed. , Disp: 1 Each, Rfl: 0    Nebulizer Accessories kit, Pt can use 4 times daily as needed. , Disp: 1 Kit, Rfl: 0    cholecalciferol (VITAMIN D3) 1,000 unit tablet, Take 1,000 Units by mouth daily. , Disp: , Rfl:     miscellaneous medical supply misc, Replacement of TENS unit pads as needed. , Disp: , Rfl:     multivitamin with iron tablet, Take 1 Tab by mouth., Disp: , Rfl:     thiamine (B-1) 100 mg tablet, Take 100 mg by mouth., Disp: , Rfl:    Date Last Reviewed:  11/30/2020    Complexity Level: Brief history (0):  LOW COMPLEXITY   ASSESSMENT OF OCCUPATIONAL PERFORMANCE:   RANGE OF MOTION:     · AROM:           R UE:  Composite finger flexion: Index finger 1-2 cm from distal lyman crease [place]); ring/middle touching DPC; pinky 1 cm. Thumb to base of pinky: 1 cm from base. Wrist flexion: 32  Wrist extension: 30  Radial deviation: 10  Ulnar deviation : 18  Supination: 55  Pronation: 85   L UE:  Wrist extension: 70  Flexion: 80  STRENGTH:     Strength (Dynamometer on second rung; Average in pounds) 11/30     L 45 lbs     RUE Not tested       SENSATION:  Reports intermittent numbness in index, middle, and ring fingers. OBSERVATION/PALPATION:  Volar 6 cm pink thin incision from proximal wrist crease proximally. Physical Skills Involved:  1. Range of Motion  2. Strength  3. Sensation  4. Fine Motor Control  5. Gross Motor Control  6. Pain (acute)  7. Pain (Chronic)  8. Edema  9. Skin Integrity Cognitive Skills Affected (resulting in the inability to perform in a timely and safe manner):  1. None noted Psychosocial Skills Affected:  1. Habits/Routines  2. Environmental Adaptation  3. Social Interaction  4.  Social Roles   Number of elements that affect the Plan of Care[de-identified] 1-3:  LOW COMPLEXITY   CLINICAL DECISION MAKING:   Clinical Decision-Making Assessment:  Long Berger required none to minimal verbal, visual, physical or environmental cues to carry out assessment tasks.    Assessment process, impact of co-morbidities, assessment modification\need for assistance, and selection of interventions: Analytical Complexity:LOW COMPLEXITY

## 2020-11-30 NOTE — PROGRESS NOTES
Florencio Looney [place]  : 1960  Primary: Meme Yousif Medicare Advantage  Secondary:  [address]  [facility], [address]  [phone]   [phone]        OUTPATIENT OCCUPATIONAL THERAPY: Daily Treatment Note 2020  Visit Count:  1    ICD-10: Treatment Diagnosis: Pain in right wrist (M25.531)    Precautions/Allergies:   Tylox [oxycodone-acetaminophen]   TREATMENT PLAN:  Effective Dates: 2020 TO 2021 (90 days). Frequency/Duration: 1-2 times a week for 90 Day(s)    Pre-treatment Symptoms/Complaints:    Pain: Initial: Pain Intensity 1: (\"severe\" per Brayden Patel)  Post Session:  none/10   Medications Last Reviewed:  2020   Updated Objective Findings:  See evaluation note from today   TREATMENT:     Therapeutic Exercise: (  25 minutes):  Exercises per grid below to improve mobility, strength and coordination. Required minimal visual, verbal, manual and tactile cues to promote proper body alignment, promote proper body posture and promote proper body mechanics. Progressed resistance, range, repetitions and complexity of movement as indicated. Date:   Date:   Date:     Activity/Exercise Parameters Parameters Parameters   Wrist extension flexion 1. Encouraging tenodesis 2-3 sets 5-10 reps. Fluidotherapy X 15-20 minutes with active wrist extension flexion and digit flexion extension     Supination pronation 1-2 sets 10-15 reps AROM. Home program: As above. [employer] Portal  Treatment/Session Summary:    · Response to Treatment:  tolerated well without complications. · Communication/Consultation:  Eval sent to MD  · Equipment provided today:  None today  · Recommendations/Intent for next treatment session: Next visit will focus on advancement to more challenging activities.     Total Treatment Billable Duration:  45 minutes  OT Patient Time In/Time Out  Time In: 0800  Time Out: 0845  EMILIANA Golden/ALEJANDRO    Future Appointments   Date Time Provider Kaur Liat   12/21/2020  8:00 AM EMILIANA Chi/L [facility]   12/28/2020  8:00 AM Terra Calk, OTD, OTR/L [facility]

## 2020-12-21 ENCOUNTER — HOSPITAL ENCOUNTER (OUTPATIENT)
Dept: PHYSICAL THERAPY | Age: 60
Discharge: HOME | End: 2020-12-21
Payer: MEDICARE

## 2020-12-21 PROCEDURE — 97018 PARAFFIN BATH THERAPY: CPT

## 2020-12-21 PROCEDURE — 97110 THERAPEUTIC EXERCISES: CPT

## 2020-12-21 PROCEDURE — 97140 MANUAL THERAPY 1/> REGIONS: CPT

## 2020-12-21 NOTE — PROGRESS NOTES
Chandler Drew  : 1960  Primary: En Escobar Medicare Advantage  Secondary:  [address] [place]  at [address] [facility] [address], 101 [facility], [address]  [phone]   [phone]        OUTPATIENT OCCUPATIONAL THERAPY: Daily Treatment Note 2020  Visit Count:  2    ICD-10: Treatment Diagnosis: Pain in right wrist (M25.531)    Precautions/Allergies:   Tylox [oxycodone-acetaminophen]   TREATMENT PLAN:  Effective Dates: 2020 TO 2021 (90 days). Frequency/Duration: 1-2 times a week for 90 Day(s)    Pre-treatment Symptoms/Complaints:    Pain: Initial: Pain Intensity 1: 6  Post Session:  none/10   Medications Last Reviewed:  2020   Updated Objective Findings:  : Wrist extension: 45; wrist flexion: 30; supination: 70   TREATMENT:   MODALITIES: (5 minutes):      *  Cold Pack Therapy in order to provide analgesia. *  Hot Pack Therapy in order to provide analgesia. Manual Therapy: (8 minutes): Soft tissue mobilization was utilized and necessary because of the patient's restricted joint motion. Petrissage and effleurage over wrist/finger extensors flexors with cocoa butter. Patient states \"it hurts so good. \"    Therapeutic Exercise: (  30 minutes):  Exercises per grid below to improve mobility, strength and coordination. Required minimal visual, verbal, manual and tactile cues to promote proper body alignment, promote proper body posture and promote proper body mechanics. Progressed resistance, range, repetitions and complexity of movement as indicated. Date:   Date:   Date:     Activity/Exercise Parameters Parameters Parameters   Wrist extension flexion 1. Encouraging tenodesis 2-3 sets 5-10 reps. 1. Encouraging tenodesis 2-3 sets 5-10 reps    Fluidotherapy X 15-20 minutes with active wrist extension flexion and digit flexion extension     Supination pronation 1-2 sets 10-15 reps AROM.  1. Supination wheel 1-2 sets 10-15 reps AROM. 2. Wand pronate to supinate to neutral 1-2 sets 10-15 reps. Extrinsic flexor extensor tendon stretches  1. Flexion - \"prayer\" stretches 1-2 sets 2-3 minutes. 2. Extension - 2-3 sets 30-60 seconds    Finger extension  1. Roll yellow theraputty then extend fingers 5-10 sets. Home program: As above. [employer] Portal  Treatment/Session Summary:  Pain with supination. Improved wrist extension flexion and supination pronation arc by 15-20°. Continue OT per plan of care. · Response to Treatment:  tolerated well without complications. · Communication/Consultation:  None today  · Equipment provided today:  None today  · Recommendations/Intent for next treatment session: Next visit will focus on advancement to more challenging activities.     Total Treatment Billable Duration:  45 minutes  OT Patient Time In/Time Out  Time In: 0800  Time Out: 0845  EMILIANA Barreto/ALEJANDRO    Future Appointments   Date Time Provider Kaur Josue   12/28/2020  8:00 AM EMILIANA Suresh/L [facility] DOC

## 2020-12-22 PROBLEM — F11.90 CHRONIC, CONTINUOUS USE OF OPIOIDS: Status: ACTIVE | Noted: 2018-02-21

## 2020-12-22 PROBLEM — M47.814 THORACIC SPONDYLOSIS: Status: ACTIVE | Noted: 2017-01-27

## 2020-12-28 ENCOUNTER — HOSPITAL ENCOUNTER (OUTPATIENT)
Dept: PHYSICAL THERAPY | Age: 60
Discharge: HOME | End: 2020-12-28
Payer: MEDICARE

## 2020-12-28 PROCEDURE — 97018 PARAFFIN BATH THERAPY: CPT

## 2020-12-28 PROCEDURE — 97110 THERAPEUTIC EXERCISES: CPT

## 2020-12-28 NOTE — PROGRESS NOTES
Quirino Drew  : 1960  Primary: Jessica Joseph Medicare Advantage  Secondary:  [address]  at [facility]  [address], [address] [facility], [address]  [phone]   [phone]        OUTPATIENT OCCUPATIONAL THERAPY: Daily Treatment Note 2020  Visit Count:  3    ICD-10: Treatment Diagnosis: Pain in right wrist (M25.531)    Precautions/Allergies:   Tylox [oxycodone-acetaminophen]   TREATMENT PLAN:  Effective Dates: 2020 TO 2021 (90 days). Frequency/Duration: 1-2 times a week for 90 Day(s)    Pre-treatment Symptoms/Complaints:  Patient states her middle finger is pretty much always numb. Received an injection at last ortho appointment for it. Drops things sometimes without realizing it. Pain: Initial: Pain Intensity 1: 7  Post Session:  none/10   Medications Last Reviewed:  2020   Updated Objective Findings:  : Wrist extension: 40; wrist flexion: 40; supination: 70; composite finger flexion: full; thumb opposition: full : Wrist extension: 45; wrist flexion: 30; supination: 70   TREATMENT:   MODALITIES: (5 minutes):  Paraffin Therapy in order to provide analgesia and to improve tissue extensibility in preparation for therapeutic exercises. Patient's hand dipped in paraffin 3 times in preparation for therapeutic exercises. Patient states it felt good. Manual Therapy: (4 minutes): Soft tissue mobilization was utilized and necessary because of the patient's restricted joint motion. Scar massage completed over volar incision with cocoa butter. Patient reports some soreness with this. Therapeutic Exercise: (  38 minutes):  Exercises per grid below to improve mobility, strength and coordination. Required minimal visual, verbal, manual and tactile cues to promote proper body alignment, promote proper body posture and promote proper body mechanics.   Progressed resistance, range, repetitions and complexity of movement as indicated. Date:  11/30 Date:  12/21 Date:  12/28   Activity/Exercise Parameters Parameters Parameters   Wrist extension flexion 1. Encouraging tenodesis 2-3 sets 5-10 reps. 1. Encouraging tenodesis 2-3 sets 5-10 reps 1. Encouraging tenodesis 2-3 sets 5-10 reps AROM. 2. Isometric strengthening A: against tan theraputty 2-3 sets 5-10 reps. B: against opposite hand resistance 2-3 sets 5-10 reps. Fluidotherapy X 15-20 minutes with active wrist extension flexion and digit flexion extension     Supination pronation 1-2 sets 10-15 reps AROM. 1. Supination wheel 1-2 sets 10-15 reps AROM. 2. Wand pronate to supinate to neutral 1-2 sets 10-15 reps. 1. Supination wheel 1-2 sets 10-15 reps AROM. Extrinsic flexor extensor tendon stretches  1. Flexion - \"prayer\" stretches 1-2 sets 2-3 minutes. 2. Extension - 2-3 sets 30-60 seconds 1. Flexion - \"prayer\" stretches 1-2 sets 2-3 minutes. 2. Extension - 2-3 sets 30-60 seconds   Finger extension  1. Roll yellow theraputty then extend fingers 5-10 sets. Wrist radial ulnar deviation   1. Encouraging tenodesis 1-2 sets 5-10 reps AROM. 2. Isometric strengthening A: against tan theraputty 2-3 sets 5-10 reps. B: against opposite hand resistance 2-3 sets 5-10 reps. Tendon glides   1. Hook, straight and full fist 1-2 sets 5-10 reps AROM   Median nerve glide   2-3 sets held 30-60 seconds   Home program: As above. [employer] Portal  Treatment/Session Summary:  Patient has maintained ROM gains made last session. She continues to have numbness in middle finger primarily. Tendon/nerve glides and isometric strengthening added to home program.  Wants to come every other week. Continue OT per plan of care. · Response to Treatment:  tolerated well without complications.   · Communication/Consultation:  None today  · Equipment provided today:  None today  · Recommendations/Intent for next treatment session: Next visit will focus on advancement to more challenging activities.     Total Treatment Billable Duration:  45 minutes  OT Patient Time In/Time Out  Time In: 0800  Time Out: 0845  Sridhar Vasquez OTR/L    Future Appointments   Date Time Provider Kaur Josue   1/11/2021  9:30 AM Americo Palm OTR/L [facility]   1/12/2021  9:30 AM [facility]ton [facility]   1/25/2021  8:00 AM RAHEEM Sweet OTR/L [facility]   2/8/2021  8:00 AM RAHEEM Sweet OTR/L [facility]

## 2021-01-11 ENCOUNTER — APPOINTMENT (OUTPATIENT)
Dept: PHYSICAL THERAPY | Age: 61
End: 2021-01-11
Payer: MEDICARE

## 2021-01-20 ENCOUNTER — HOSPITAL ENCOUNTER (OUTPATIENT)
Dept: CT IMAGING | Age: 61
Discharge: HOME | End: 2021-01-20
Attending: OTOLARYNGOLOGY
Payer: MEDICARE

## 2021-01-20 DIAGNOSIS — J32.9 RECURRENT SINUS INFECTIONS: ICD-10-CM

## 2021-01-20 PROCEDURE — 70486 CT MAXILLOFACIAL W/O DYE: CPT

## 2021-01-25 ENCOUNTER — HOSPITAL ENCOUNTER (OUTPATIENT)
Dept: PHYSICAL THERAPY | Age: 61
Discharge: HOME | End: 2021-01-25
Payer: MEDICARE

## 2021-01-25 PROCEDURE — 97140 MANUAL THERAPY 1/> REGIONS: CPT

## 2021-01-25 PROCEDURE — 97018 PARAFFIN BATH THERAPY: CPT

## 2021-01-25 PROCEDURE — 97110 THERAPEUTIC EXERCISES: CPT

## 2021-01-25 NOTE — PROGRESS NOTES
Steven Drew  : 1960  Primary: [employer] Medicare Advantage  Secondary:  [address]  [facility] [address] [facility], [place] [address] [address] [facility]  [phone]   [phone]       OUTPATIENT OCCUPATIONAL THERAPY:Initial Assessment 2021   ICD-10: Treatment Diagnosis: Pain in right wrist (M25.531)    Precautions/Allergies:   Tylox [oxycodone-acetaminophen]   TREATMENT PLAN:  Effective Dates: 2020 TO 2021 (90 days). Frequency/Duration: 1-2 times a week for 90 Day(s) MEDICAL/REFERRING DIAGNOSIS:  Other intraarticular fracture of lower end of right radius, initial encounter for closed fracture [S52.571A]   DATE OF ONSET: 10/29/20  REFERRING PHYSICIAN: Fracisco Calderon MD MD Orders: OT evaluate and treat. Return MD Appointment: Pending. PROGRESS REPORT:   Ms. Drew attended 4 outpatient OT visits since  improving her wrist extension flexion by 28°, radial/ulnar deviation by 15°, and supination by 20°. She continues to present with weakness with a  strength of 15 lbs. She also has numbness in her 1st through 4th fingers which Dr. Ty Lindsey is aware. I feel that Ms. Drew will continue to benefit from skilled occupational therapy to maximize the functional use of her hand, wrist and upper extremity in daily activities and work tasks. PROBLEM LIST (Impacting functional limitations):  1. Pain in R wrist.  2. Decreased motion in R wrist/hand/forearm. 3. Decreased strength in R wrist/hand/forearm. INTERVENTIONS PLANNED: (Treatment may consist of any combination of the following)  1. Modalities as warranted  2. Therapeutic exercise including a home exercise program.  3. Manual therapy. 4. Therapeutic activities. 5. Orthotic management and training as warranted. GOALS: (Goals have been discussed and agreed upon with patient.)  Short-Term Functional Goals: Time Frame: 4 weeks  1.  Decrease pain to moderate or less per Quick-DASH to allow patient to perform self care tasks. Not assessed this date. Continue. 2. Increase motion in R wrist extension flexion by 30 degrees to improve functional use of upper extremity in ADL activities. Not met. Continue. 3. Increase motion in fingers to full composite flexion to allow patient to  and lift objects during self care activities. Met. Discharge Goals: Time Frame: 12 weeks  1. Decrease pain to mild or less per Quick-DASH to allow patient to perform all household and work tasks. Not assessed this date. Continue. 2. Increase motion in R wrist extension flexion by 60 degreees to allow patient to perform all ADL activities. Not met. Continue. 3. Increase strength in R  by 30 pounds to allow patient to , lift, hold, and carry objects. Not met. Continue. OUTCOME MEASURE:   Tool Used: Disabilities of the Arm, Shoulder and Hand (DASH) Questionnaire - Quick Version  Score:  Initial: 50/55  Most Recent: X/55 (Date: -- )   Interpretation of Score: The DASH is designed to measure the activities of daily living in person's with upper extremity dysfunction or pain. Each section is scored on a 1-5 scale, 5 representing the greatest disability. The scores of each section are added together for a total score of 55. MEDICAL NECESSITY:   · Patient demonstrates good rehab potential due to higher previous functional level. REASON FOR SERVICES/OTHER COMMENTS:  · Patient continues to require skilled intervention due to s/p R distal radius fracture with stiffness/pain limiting overall functional use of R hand/wrist/UE for ADL. Total Duration:  OT Patient Time In/Time Out  Time In: 0800  Time Out: 0845    Rehabilitation Potential For Stated Goals: Good  Thank you for this referral,  RAHEEM Lopez, OTR/L        PAIN/SUBJECTIVE:   Initial: Pain Intensity 1: 7   Post Session:  No rating given/10   OCCUPATIONAL PROFILE & HISTORY:   History of Injury/Illness (Reason for Referral):   Tatianna Ramos Ab states she fell sustaining a distal radius fracture on 10/29 after bumping her head against the hatchback of her vehicle as she was picking something up then falling and is s/p distal radius ORIF by Dr. Lit Duncan on 11/2/20. Patient states she has 7/10 pain currently. She states if she holds her arm in the \"wrong\" position she feels numbness in her index, middle and ring fingers. She is wearing a wrist cock-up orthosis and a sling for \"comfort\" as her hand has pain when it is in a dependent position. Past Medical History/Comorbidities: Patient states has knee troubles - has fallen about 3-4 times per patient. Ms. Leopold Oka  has a past medical history of Arthritis, Depression, GERD (gastroesophageal reflux disease), and History of degenerative disc disease. She also has no past medical history of Difficult intubation, Malignant hyperthermia due to anesthesia, Nausea & vomiting, or Pseudocholinesterase deficiency. Ms. Leopold Oka  has a past surgical history that includes hx hernia repair (2016); hx gastric bypass (2012); hx orthopaedic (Left); hx orthopaedic (Right); hx colonoscopy; hx cholecystectomy; and hx other surgical.  Social History/Living Environment:     Lives with . Grandmother of 3 little children she assists regularly. Prior Level of Function/Work/Activity:  Disabled. Dominant Side:         RIGHT  Previous Treatment Approaches:          Outpatient PT following R/L rotator cuff surgeries. Ambulatory/Rehab Services H2 Model Falls Risk Assessment   Risk Factors:       (5)  History of Recent Falls [w/in 3 months] Ability to Rise from Chair:       (0)  Ability to rise in a single movement   Falls Prevention Plan:       No modifications necessary   Total: (5 or greater = High Risk): 5   ©2010 [facility] [employer]. All Rights Reserved. [place] [employer] Patent #[id number].  Federal Law prohibits the replication, distribution or use without written permission from [employer]   Current Medications:       Current Outpatient Medications:     naloxone (Narcan) 4 mg/actuation nasal spray, 4 mg by Nasal route as needed. , Disp: , Rfl:     diclofenac (VOLTAREN) 1 % gel, Apply  to affected area., Disp: , Rfl:     amoxicillin-clavulanate (AUGMENTIN) 875-125 mg per tablet, Take 1 Tab by mouth every twelve (12) hours. , Disp: 14 Tab, Rfl: 0    montelukast (SINGULAIR) 10 mg tablet, Take 1 Tab by mouth daily. , Disp: 90 Tab, Rfl: 0    citalopram (CELEXA) 20 mg tablet, Take 1 Tab by mouth daily. (Patient taking differently: Take 20 mg by mouth every evening.), Disp: 90 Tab, Rfl: 0    fluticasone propionate (FLONASE) 50 mcg/actuation nasal spray, 2 Sprays by Both Nostrils route daily. , Disp: 1 Bottle, Rfl: 2    topiramate (TOPAMAX) 25 mg tablet, 1 tab po bid (Patient taking differently: 25 mg nightly. 1 tab po bid), Disp: 60 Tab, Rfl: 2    ondansetron hcl (ZOFRAN) 4 mg tablet, Take 1 Tab by mouth every eight (8) hours as needed for Nausea or Nausea or Vomiting., Disp: 10 Tab, Rfl: 0    albuterol (PROVENTIL VENTOLIN) 2.5 mg /3 mL (0.083 %) nebu, 3 mL by Nebulization route every six (6) hours as needed for Wheezing., Disp: 30 Nebule, Rfl: 1    tiZANidine (ZANAFLEX) 4 mg tablet, Take 4 mg by mouth. Just for ablation, Disp: , Rfl:     omeprazole (PRILOSEC) 20 mg capsule, Take 1 Cap by mouth daily. , Disp: 30 Cap, Rfl: 3    budesonide-formoterol (SYMBICORT) 80-4.5 mcg/actuation HFAA, Take 2 Puffs by inhalation two (2) times a day., Disp: 1 Inhaler, Rfl: 2    albuterol (PROVENTIL HFA, VENTOLIN HFA, PROAIR HFA) 90 mcg/actuation inhaler, Take 2 Puffs by inhalation every six (6) hours as needed for Wheezing., Disp: 1 Inhaler, Rfl: 2    metFORMIN (GLUCOPHAGE) 500 mg tablet, Take 1 Tab by mouth daily (with breakfast). (Patient taking differently: Take 500 mg by mouth daily (with breakfast). Only takes for an lumbar and thoracic ablation.   Takes 1 week before and 1 week after), Disp: 30 Tab, Rfl: 2    gabapentin (NEURONTIN) 300 mg capsule, Take 300 mg by mouth daily. , Disp: , Rfl:     HYDROcodone-acetaminophen (NORCO)  mg tablet, Take 1 Tab by mouth every six (6) hours as needed for Pain., Disp: , Rfl:     Nebulizer & Compressor machine, Pt can use 4 times a day as needed. , Disp: 1 Each, Rfl: 0    Nebulizer Accessories kit, Pt can use 4 times daily as needed. , Disp: 1 Kit, Rfl: 0    cholecalciferol (VITAMIN D3) 1,000 unit tablet, Take 1,000 Units by mouth daily. , Disp: , Rfl:     miscellaneous medical supply misc, Replacement of TENS unit pads as needed. , Disp: , Rfl:     multivitamin with iron tablet, Take 1 Tab by mouth., Disp: , Rfl:     thiamine (B-1) 100 mg tablet, Take 100 mg by mouth., Disp: , Rfl:    Date Last Reviewed:  1/25/2021    Complexity Level: Brief history (0):  LOW COMPLEXITY   ASSESSMENT OF OCCUPATIONAL PERFORMANCE:   RANGE OF MOTION:     · AROM:           R UE:  Composite finger flexion: Index finger 1-2 cm from distal lyman crease [place]); ring/middle touching DPC; pinky 1 cm. Thumb to base of pinky: 1 cm from base. Wrist flexion: 40 versus 32  Wrist extension: 50 versus 30  Radial deviation: 15 versus 10  Ulnar deviation : 28 versus 18  Supination: 75 versus 55  Pronation: 85   L UE:  Wrist extension: 70  Flexion: 80  STRENGTH:     Strength (Dynamometer on second rung; Average in pounds) 11/30 1/25    L 45 lbs     RUE Not tested 15 lbs      SENSATION:  Reports intermittent numbness in index, middle, and ring fingers. OBSERVATION/PALPATION:  Volar 6 cm pink thin incision from proximal wrist crease proximally. Physical Skills Involved:  1. Range of Motion  2. Strength  3. Sensation  4. Fine Motor Control  5. Gross Motor Control  6. Pain (acute)  7. Pain (Chronic)  8. Edema  9. Skin Integrity Cognitive Skills Affected (resulting in the inability to perform in a timely and safe manner):  1. None noted Psychosocial Skills Affected:  1. Habits/Routines  2.  Environmental Adaptation  3. Social Interaction  4. Social Roles   Number of elements that affect the Plan of Care[de-identified] 1-3:  LOW COMPLEXITY   CLINICAL DECISION MAKING:   Clinical Decision-Making Assessment:  Michelle Berger required none to minimal verbal, visual, physical or environmental cues to carry out assessment tasks.    Assessment process, impact of co-morbidities, assessment modification\need for assistance, and selection of interventions: Analytical Complexity:LOW COMPLEXITY

## 2021-01-25 NOTE — PROGRESS NOTES
Neeru Edwardsont  : 1960  Primary: [employer]uelo Mora Medicare Advantage  Secondary:  [address]  [facility] [address], [address]  [phone]   [phone]        OUTPATIENT OCCUPATIONAL THERAPY: Daily Treatment Note 2021  Visit Count:  4    ICD-10: Treatment Diagnosis: Pain in right wrist (M25.531)    Precautions/Allergies:   Tylox [oxycodone-acetaminophen]   TREATMENT PLAN:  Effective Dates: 2020 TO 2021 (90 days). Frequency/Duration: 1-2 times a week for 90 Day(s)    Pre-treatment Symptoms/Complaints:  Patient states her wrist is still hurting when she uses it radially and ulnarly. She states it is still numb. States Dr. Sammi Le mentioned possible carpal tunnel surgery and states the x-rays looked good - bone healing. Pain: Initial: Pain Intensity 1: 7  Post Session:  none/10   Medications Last Reviewed:  2021   Updated Objective Findings:  See evaluation note from today and : Wrist extension: 40; wrist flexion: 40; supination: 70; composite finger flexion: full; thumb opposition: full : Wrist extension: 45; wrist flexion: 30; supination: 70   TREATMENT:   MODALITIES: (5 minutes):  Paraffin Therapy in order to provide analgesia and to improve tissue extensibility in preparation for therapeutic exercises. Patient's hand dipped in paraffin 3 times in preparation for therapeutic exercises. Patient states it felt good. Manual Therapy: (8 inutes): Soft tissue mobilization was utilized and necessary because of the patient's restricted joint motion. Petrissage/effleurage over wrist/finger extensors/flexors completed as well as scar massage completed over volar incision with cocoa butter. Patient states ROM felt better following this. Therapeutic Exercise: (  45 minutes):  Exercises per grid below to improve mobility, strength and coordination.   Required minimal visual, verbal, manual and tactile cues to promote proper body alignment, promote proper body posture and promote proper body mechanics. Progressed resistance, range, repetitions and complexity of movement as indicated. Date:  1/25   Activity/Exercise Parameters   Wrist extension flexion 1. Encouraging tenodesis 1-2 sets 5-10 reps AROM. 2. Wrist extension A: 1 lb dumbbell 2-3 sets 5-10 reps. 3. Wrist flexion A: 1 lb dumbbell 2-3 sets 5-10 reps. .   Supination pronation 1. Supination wheel 2-3 sets 10-15 reps AROM. 2. Wand pronate to supinate to neutral 1-2 sets 10-15 reps. Extrinsic flexor extensor tendon stretches 1. Flexion - \"prayer\" stretches 1-2 sets 2-3 minutes. 2. Extension assist by therapist - 2-3 sets  seconds   Finger extension Tan/green theraputty then extend fingers 2-3 sets 5-10 reps. Digit adduction Tan theraputty 2-3 sets 5-10 reps. Wrist radial ulnar deviation 1-2 sets 5-10 reps AROM. Tendon glides 1. Hook, straight and full fist 1-2 sets 5-10 reps AROM   Median nerve glide 2-3 sets held 30-60 seconds   Home program: As above. [employer] Portal  Treatment/Session Summary:  Patient has improved her ROM, but still has pain/numbness. We added advanced more strengthening. Continue OT per plan of care. · Response to Treatment:  tolerated well without complications. · Communication/Consultation:  None today  · Equipment provided today:  None today  · Recommendations/Intent for next treatment session: Next visit will focus on advancement to more challenging activities.     Total Treatment Billable Duration:  60 minutes  OT Patient Time In/Time Out  Time In: 0800  Time Out: 0900  EMILIANA Carmona/L    Future Appointments   Date Time Provider Kaur Josue   2/2/2021  8:45 AM Phil Shipman MD [facility] [place] [facility]   2/15/2021  8:00 AM RAHEEM Toscano OTR/L [facility]

## 2021-02-08 ENCOUNTER — APPOINTMENT (OUTPATIENT)
Dept: PHYSICAL THERAPY | Age: 61
End: 2021-02-08

## 2021-02-15 ENCOUNTER — HOSPITAL ENCOUNTER (OUTPATIENT)
Dept: PHYSICAL THERAPY | Age: 61
Discharge: HOME | End: 2021-02-15

## 2021-02-18 ENCOUNTER — HOSPITAL ENCOUNTER (OUTPATIENT)
Dept: LAB | Age: 61
Discharge: HOME | End: 2021-02-18
Payer: MEDICARE

## 2021-02-18 LAB
ALBUMIN SERPL-MCNC: 4.1 G/DL (ref 3.2–4.6)
ALBUMIN/GLOB SERPL: 1 {RATIO} (ref 1.2–3.5)
ALP SERPL-CCNC: 114 U/L (ref 50–136)
ALT SERPL-CCNC: 25 U/L (ref 12–65)
ANION GAP SERPL CALC-SCNC: 3 MMOL/L (ref 7–16)
AST SERPL-CCNC: 40 U/L (ref 15–37)
BASOPHILS # BLD: 0 K/UL (ref 0–0.2)
BASOPHILS NFR BLD: 0 % (ref 0–2)
BILIRUB SERPL-MCNC: 1 MG/DL (ref 0.2–1.1)
BUN SERPL-MCNC: 15 MG/DL (ref 8–23)
CALCIUM SERPL-MCNC: 9.3 MG/DL (ref 8.3–10.4)
CHLORIDE SERPL-SCNC: 106 MMOL/L (ref 98–107)
CHOLEST SERPL-MCNC: 211 MG/DL
CO2 SERPL-SCNC: 31 MMOL/L (ref 21–32)
CREAT SERPL-MCNC: 0.71 MG/DL (ref 0.6–1)
DIFFERENTIAL METHOD BLD: ABNORMAL
EOSINOPHIL # BLD: 0.1 K/UL (ref 0–0.8)
EOSINOPHIL NFR BLD: 2 % (ref 0.5–7.8)
ERYTHROCYTE [DISTWIDTH] IN BLOOD BY AUTOMATED COUNT: 13.4 % (ref 11.9–14.6)
GLOBULIN SER CALC-MCNC: 4.3 G/DL (ref 2.3–3.5)
GLUCOSE SERPL-MCNC: 125 MG/DL (ref 65–100)
HCT VFR BLD AUTO: 47 % (ref 35.8–46.3)
HDLC SERPL-MCNC: 63 MG/DL (ref 40–60)
HDLC SERPL: 3.3 {RATIO}
HGB BLD-MCNC: 15.2 G/DL (ref 11.7–15.4)
IMM GRANULOCYTES # BLD AUTO: 0 K/UL (ref 0–0.5)
IMM GRANULOCYTES NFR BLD AUTO: 0 % (ref 0–5)
LDLC SERPL CALC-MCNC: 122.2 MG/DL
LIPID PROFILE,FLP: ABNORMAL
LYMPHOCYTES # BLD: 2.3 K/UL (ref 0.5–4.6)
LYMPHOCYTES NFR BLD: 33 % (ref 13–44)
MAGNESIUM SERPL-MCNC: 2.4 MG/DL (ref 1.8–2.4)
MCH RBC QN AUTO: 30.2 PG (ref 26.1–32.9)
MCHC RBC AUTO-ENTMCNC: 32.3 G/DL (ref 31.4–35)
MCV RBC AUTO: 93.3 FL (ref 79.6–97.8)
MONOCYTES # BLD: 0.4 K/UL (ref 0.1–1.3)
MONOCYTES NFR BLD: 5 % (ref 4–12)
NEUTS SEG # BLD: 4.1 K/UL (ref 1.7–8.2)
NEUTS SEG NFR BLD: 60 % (ref 43–78)
NRBC # BLD: 0 K/UL (ref 0–0.2)
PLATELET # BLD AUTO: 352 K/UL (ref 150–450)
PMV BLD AUTO: 9.8 FL (ref 9.4–12.3)
POTASSIUM SERPL-SCNC: 4.2 MMOL/L (ref 3.5–5.1)
PROT SERPL-MCNC: 8.4 G/DL (ref 6.3–8.2)
RBC # BLD AUTO: 5.04 M/UL (ref 4.05–5.2)
SODIUM SERPL-SCNC: 140 MMOL/L (ref 136–145)
TRIGL SERPL-MCNC: 129 MG/DL (ref 35–150)
VLDLC SERPL CALC-MCNC: 25.8 MG/DL (ref 6–23)
WBC # BLD AUTO: 6.9 K/UL (ref 4.3–11.1)

## 2021-02-18 PROCEDURE — 83735 ASSAY OF MAGNESIUM: CPT

## 2021-02-18 PROCEDURE — 80053 COMPREHEN METABOLIC PANEL: CPT

## 2021-02-18 PROCEDURE — 36415 COLL VENOUS BLD VENIPUNCTURE: CPT

## 2021-02-18 PROCEDURE — 85025 COMPLETE CBC W/AUTO DIFF WBC: CPT

## 2021-02-18 PROCEDURE — 80061 LIPID PANEL: CPT

## 2021-02-18 NOTE — PROGRESS NOTES
Results to patient please. Glucose is 125. A1c is 7.3. Did she get the glipizide that I sent to pharmacy? Watch for signs of hypoglycemia please. SGOT is still elevated. SGOT most likely elevated due to the pain meds. Does she want to go for abdominal ultrasound? Total cholesterol is 211, triglycerides 129, HDL 63, and LDL is 122. Magnesium is normal.  Hemoglobin is normal.  Continue current meds keep scheduled follow-up.

## 2021-03-04 ENCOUNTER — HOSPITAL ENCOUNTER (OUTPATIENT)
Dept: ULTRASOUND IMAGING | Age: 61
Discharge: HOME | End: 2021-03-04
Attending: NURSE PRACTITIONER
Payer: MEDICARE

## 2021-03-04 DIAGNOSIS — R74.8 ABNORMAL LIVER ENZYMES: ICD-10-CM

## 2021-03-04 PROCEDURE — 76705 ECHO EXAM OF ABDOMEN: CPT

## 2021-03-04 NOTE — PROGRESS NOTES
Results to pt. Dilated common bile duct without obstructing stone or extrinsic  mass. The patient is status post cholecystectomy.   Does she have a gastroenterologist?If so, who is it?

## 2021-03-17 ENCOUNTER — APPOINTMENT (OUTPATIENT)
Dept: MAMMOGRAPHY | Age: 61
End: 2021-03-17
Attending: NURSE PRACTITIONER
Payer: MEDICARE

## 2021-03-17 ENCOUNTER — HOSPITAL ENCOUNTER (OUTPATIENT)
Dept: MAMMOGRAPHY | Age: 61
Discharge: HOME | End: 2021-03-17
Attending: NURSE PRACTITIONER
Payer: MEDICARE

## 2021-03-17 DIAGNOSIS — Z12.39 BREAST SCREENING: ICD-10-CM

## 2021-03-17 DIAGNOSIS — Z78.0 POSTMENOPAUSE: ICD-10-CM

## 2021-03-17 PROCEDURE — 77080 DXA BONE DENSITY AXIAL: CPT

## 2021-03-17 PROCEDURE — 77067 SCR MAMMO BI INCL CAD: CPT

## 2021-03-18 NOTE — PROGRESS NOTES
Using the World Health Organization criteria, the bone mineral density is low. Does she take calcium and vitamin D daily? If so, please make sure she takes 1500 mg of calcium and 2000 iu of  vitamin D daily.

## 2021-03-23 ENCOUNTER — TRANSCRIBE ORDER (OUTPATIENT)
Dept: SCHEDULING | Age: 61
End: 2021-03-23

## 2021-03-23 DIAGNOSIS — R79.89 ELEVATED LFTS: ICD-10-CM

## 2021-03-23 DIAGNOSIS — K83.8 COMMON BILE DUCT DILATATION: Primary | ICD-10-CM

## 2021-04-06 ENCOUNTER — HOSPITAL ENCOUNTER (OUTPATIENT)
Dept: MRI IMAGING | Age: 61
Discharge: HOME | End: 2021-04-06
Attending: NURSE PRACTITIONER
Payer: MEDICARE

## 2021-04-06 DIAGNOSIS — K83.8 COMMON BILE DUCT DILATATION: ICD-10-CM

## 2021-04-06 DIAGNOSIS — R79.89 ELEVATED LFTS: ICD-10-CM

## 2021-04-06 PROCEDURE — 74181 MRI ABDOMEN W/O CONTRAST: CPT

## 2021-04-16 NOTE — H&P (VIEW-ONLY)
Orthopaedic Hand Surgery Note Name: Nnamdi Campuzano YOB: 1960 Gender: female MRN: [id number] CC: Follow up of hand numbness HPI: Patient is a 63 y.o. female who is here regarding follow up for  hand numbness and tingling. On the last visit we ordered a nerve conduction study, she has refused steroid injections which provided only short lived relief, at this point she is having significant numbness and paresthesias in median distribution as well as burning sensations, this wakes her up at night. ROS/Meds/PSH/PMH/FH/SH: I personally reviewed the patients standard intake form. Pertinents are discussed in the HPI Physical Examination: 
General: Awake and alert. HEENT: Normocephalic, atraumatic CV/Pulm: Breathing even and unlabored Circulation: Normal without obvious arterial or venous deficiency. Pulses palpable bilateral upper extremities. Skin: No obvious rashes noted. Lymphatic: No obvious evidence of lymphedema or lymphadenopathy Musculoskeletal:  
Cervical spine has normal range of motion without tenderness to palpation, negative Spurling's test. Shoulders and elbows have normal pain free range of motion. Examination of the right upper extremity demonstrates Decreased sensation to light touch in the median distribution, normal sensation in ulnar and radial distribution, positive carpal tunnel compression testing and Phalen testing. Inspection reveals no thenar atrophy. Negative Tinel and elbow flexion compression test of the cubital tunnel, negative Tinel over Guyon's canal. Sensation to light touch in the ulnar 2 digits is normal with no intrinsic atrophy/weakness. No tenderness to palpation or masses noted in the forearm. No pain with finger or thumb flexion, no pain with resisted finger flexion, no crepitus on the volar wrist with finger motion Imaging / Electrodiagnostic Tests:  
 
Nerve conduction study was reviewed with the patient which demonstrates carpal tunnel syndrome with sensory latency of 4.7 on the right Assessment: ICD-10-CM ICD-9-CM 1. Right carpal tunnel syndrome  G56.01 354.0 Plan: 
We discussed the diagnosis and different treatment options. We discussed observation, EMG/NCV, night splinting, cortisone injections and surgical decompression and the risks and benefits of all were clearly outlined. After discussing in detail the patient elects to proceed with right carpal tunnel release. Patient voiced accordance and understanding of the information provided and the formulated plan. All questions were answered to the patient's satisfaction during the encounter. 4 This is a chronic illness/condition with exacerbation and progression Treatment at this time: Elective major surgery with procedural risk factors Follow up surgery Patient understands risks and benefits of RIGHT CARPAL TUNNEL RELEASE including but not limited to nerve injury, vessel injury, infection, failure to achieve desired results and possible need for additional surgery. Patient understands and wishes to proceed with surgery. On Exam:  
The patient is alert and oriented; ;  
Lung auscultation is clear bilaterally Heart has RRR without murmurs Sarah Llamas MD 
Orthopaedic Surgery 04/16/21 
8:59 AM

## 2021-04-21 ENCOUNTER — ANESTHESIA EVENT (OUTPATIENT)
Dept: SURGERY | Age: 61
End: 2021-04-21
Payer: MEDICARE

## 2021-04-22 ENCOUNTER — ANESTHESIA (OUTPATIENT)
Dept: SURGERY | Age: 61
End: 2021-04-22
Payer: MEDICARE

## 2021-04-22 ENCOUNTER — HOSPITAL ENCOUNTER (OUTPATIENT)
Age: 61
Setting detail: OUTPATIENT SURGERY
Discharge: HOME | End: 2021-04-22
Attending: ORTHOPAEDIC SURGERY | Admitting: ORTHOPAEDIC SURGERY
Payer: MEDICARE

## 2021-04-22 VITALS
WEIGHT: 174 LBS | OXYGEN SATURATION: 98 % | DIASTOLIC BLOOD PRESSURE: 64 MMHG | SYSTOLIC BLOOD PRESSURE: 140 MMHG | HEART RATE: 58 BPM | BODY MASS INDEX: 33.98 KG/M2 | RESPIRATION RATE: 12 BRPM | TEMPERATURE: 97.5 F

## 2021-04-22 LAB
GLUCOSE BLD STRIP.AUTO-MCNC: 144 MG/DL (ref 65–100)
GLUCOSE BLD STRIP.AUTO-MCNC: 159 MG/DL (ref 65–100)
SERVICE CMNT-IMP: ABNORMAL
SERVICE CMNT-IMP: ABNORMAL

## 2021-04-22 PROCEDURE — 82962 GLUCOSE BLOOD TEST: CPT

## 2021-04-22 PROCEDURE — 77030006586 HC BLD ARTHSC BVR BD -A: Performed by: ORTHOPAEDIC SURGERY

## 2021-04-22 PROCEDURE — 74011250636 HC RX REV CODE- 250/636: Performed by: ANESTHESIOLOGY

## 2021-04-22 PROCEDURE — 77030010507 HC ADH SKN DERMBND J&J -B: Performed by: ORTHOPAEDIC SURGERY

## 2021-04-22 PROCEDURE — 2709999900 HC NON-CHARGEABLE SUPPLY: Performed by: ORTHOPAEDIC SURGERY

## 2021-04-22 PROCEDURE — 74011000250 HC RX REV CODE- 250: Performed by: ANESTHESIOLOGY

## 2021-04-22 PROCEDURE — 77030000032 HC CUF TRNQT ZIMM -B: Performed by: ORTHOPAEDIC SURGERY

## 2021-04-22 PROCEDURE — 74011250637 HC RX REV CODE- 250/637: Performed by: ANESTHESIOLOGY

## 2021-04-22 PROCEDURE — 74011250636 HC RX REV CODE- 250/636: Performed by: ORTHOPAEDIC SURGERY

## 2021-04-22 PROCEDURE — 76210000020 HC REC RM PH II FIRST 0.5 HR: Performed by: ORTHOPAEDIC SURGERY

## 2021-04-22 PROCEDURE — 76060000032 HC ANESTHESIA 0.5 TO 1 HR: Performed by: ORTHOPAEDIC SURGERY

## 2021-04-22 PROCEDURE — 74011250636 HC RX REV CODE- 250/636: Performed by: NURSE ANESTHETIST, CERTIFIED REGISTERED

## 2021-04-22 PROCEDURE — 64721 CARPAL TUNNEL SURGERY: CPT | Performed by: ORTHOPAEDIC SURGERY

## 2021-04-22 PROCEDURE — 77030003028 HC SUT VCRL J&J -A: Performed by: ORTHOPAEDIC SURGERY

## 2021-04-22 PROCEDURE — 76210000006 HC OR PH I REC 0.5 TO 1 HR: Performed by: ORTHOPAEDIC SURGERY

## 2021-04-22 PROCEDURE — 74011000250 HC RX REV CODE- 250: Performed by: ORTHOPAEDIC SURGERY

## 2021-04-22 PROCEDURE — 76010000138 HC OR TIME 0.5 TO 1 HR: Performed by: ORTHOPAEDIC SURGERY

## 2021-04-22 RX ORDER — PROPOFOL 10 MG/ML
INJECTION, EMULSION INTRAVENOUS
Status: DISCONTINUED | OUTPATIENT
Start: 2021-04-22 | End: 2021-04-22 | Stop reason: HOSPADM

## 2021-04-22 RX ORDER — LIDOCAINE HYDROCHLORIDE 5 MG/ML
INJECTION, SOLUTION INFILTRATION; INTRAVENOUS
Status: COMPLETED | OUTPATIENT
Start: 2021-04-22 | End: 2021-04-22

## 2021-04-22 RX ORDER — FAMOTIDINE 20 MG/1
20 TABLET, FILM COATED ORAL ONCE
Status: COMPLETED | OUTPATIENT
Start: 2021-04-22 | End: 2021-04-22

## 2021-04-22 RX ORDER — LIDOCAINE HYDROCHLORIDE 10 MG/ML
INJECTION INFILTRATION; PERINEURAL AS NEEDED
Status: DISCONTINUED | OUTPATIENT
Start: 2021-04-22 | End: 2021-04-22 | Stop reason: HOSPADM

## 2021-04-22 RX ORDER — CEFAZOLIN SODIUM/WATER 2 G/20 ML
2 SYRINGE (ML) INTRAVENOUS ONCE
Status: COMPLETED | OUTPATIENT
Start: 2021-04-22 | End: 2021-04-22

## 2021-04-22 RX ORDER — BUPIVACAINE HYDROCHLORIDE 2.5 MG/ML
INJECTION, SOLUTION EPIDURAL; INFILTRATION; INTRACAUDAL AS NEEDED
Status: DISCONTINUED | OUTPATIENT
Start: 2021-04-22 | End: 2021-04-22 | Stop reason: HOSPADM

## 2021-04-22 RX ORDER — SODIUM CHLORIDE 0.9 % (FLUSH) 0.9 %
5-40 SYRINGE (ML) INJECTION EVERY 8 HOURS
Status: DISCONTINUED | OUTPATIENT
Start: 2021-04-22 | End: 2021-04-22 | Stop reason: HOSPADM

## 2021-04-22 RX ORDER — ACETAMINOPHEN 500 MG
1000 TABLET ORAL ONCE
Status: COMPLETED | OUTPATIENT
Start: 2021-04-22 | End: 2021-04-22

## 2021-04-22 RX ORDER — LIDOCAINE HYDROCHLORIDE 10 MG/ML
0.1 INJECTION INFILTRATION; PERINEURAL AS NEEDED
Status: DISCONTINUED | OUTPATIENT
Start: 2021-04-22 | End: 2021-04-22 | Stop reason: HOSPADM

## 2021-04-22 RX ORDER — SODIUM CHLORIDE, SODIUM LACTATE, POTASSIUM CHLORIDE, CALCIUM CHLORIDE 600; 310; 30; 20 MG/100ML; MG/100ML; MG/100ML; MG/100ML
150 INJECTION, SOLUTION INTRAVENOUS CONTINUOUS
Status: DISCONTINUED | OUTPATIENT
Start: 2021-04-22 | End: 2021-04-22 | Stop reason: HOSPADM

## 2021-04-22 RX ORDER — MIDAZOLAM HYDROCHLORIDE 1 MG/ML
2 INJECTION, SOLUTION INTRAMUSCULAR; INTRAVENOUS
Status: DISCONTINUED | OUTPATIENT
Start: 2021-04-22 | End: 2021-04-22 | Stop reason: HOSPADM

## 2021-04-22 RX ORDER — SODIUM CHLORIDE 0.9 % (FLUSH) 0.9 %
5-40 SYRINGE (ML) INJECTION AS NEEDED
Status: DISCONTINUED | OUTPATIENT
Start: 2021-04-22 | End: 2021-04-22 | Stop reason: HOSPADM

## 2021-04-22 RX ORDER — HYDROCODONE BITARTRATE AND ACETAMINOPHEN 5; 325 MG/1; MG/1
1 TABLET ORAL AS NEEDED
Status: DISCONTINUED | OUTPATIENT
Start: 2021-04-22 | End: 2021-04-22 | Stop reason: HOSPADM

## 2021-04-22 RX ORDER — HYDROMORPHONE HYDROCHLORIDE 1 MG/ML
0.5 INJECTION, SOLUTION INTRAMUSCULAR; INTRAVENOUS; SUBCUTANEOUS
Status: DISCONTINUED | OUTPATIENT
Start: 2021-04-22 | End: 2021-04-22 | Stop reason: HOSPADM

## 2021-04-22 RX ORDER — ACETAMINOPHEN 500 MG
1000 TABLET ORAL
Status: DISCONTINUED | OUTPATIENT
Start: 2021-04-22 | End: 2021-04-22 | Stop reason: HOSPADM

## 2021-04-22 RX ORDER — FENTANYL CITRATE 50 UG/ML
100 INJECTION, SOLUTION INTRAMUSCULAR; INTRAVENOUS ONCE
Status: DISCONTINUED | OUTPATIENT
Start: 2021-04-22 | End: 2021-04-22 | Stop reason: HOSPADM

## 2021-04-22 RX ORDER — SODIUM CHLORIDE 9 MG/ML
50 INJECTION, SOLUTION INTRAVENOUS CONTINUOUS
Status: DISCONTINUED | OUTPATIENT
Start: 2021-04-22 | End: 2021-04-22 | Stop reason: HOSPADM

## 2021-04-22 RX ORDER — MIDAZOLAM HYDROCHLORIDE 1 MG/ML
INJECTION, SOLUTION INTRAMUSCULAR; INTRAVENOUS AS NEEDED
Status: DISCONTINUED | OUTPATIENT
Start: 2021-04-22 | End: 2021-04-22 | Stop reason: HOSPADM

## 2021-04-22 RX ADMIN — HYDROMORPHONE HYDROCHLORIDE 0.5 MG: 1 INJECTION, SOLUTION INTRAMUSCULAR; INTRAVENOUS; SUBCUTANEOUS at 10:52

## 2021-04-22 RX ADMIN — SODIUM CHLORIDE, SODIUM LACTATE, POTASSIUM CHLORIDE, AND CALCIUM CHLORIDE 150 ML/HR: 600; 310; 30; 20 INJECTION, SOLUTION INTRAVENOUS at 08:27

## 2021-04-22 RX ADMIN — MIDAZOLAM 2 MG: 1 INJECTION INTRAMUSCULAR; INTRAVENOUS at 09:48

## 2021-04-22 RX ADMIN — HYDROCODONE BITARTRATE AND ACETAMINOPHEN 1 TABLET: 5; 325 TABLET ORAL at 11:29

## 2021-04-22 RX ADMIN — ACETAMINOPHEN 1000 MG: 500 TABLET ORAL at 08:27

## 2021-04-22 RX ADMIN — CEFAZOLIN 2 G: 1 INJECTION, POWDER, FOR SOLUTION INTRAVENOUS at 09:49

## 2021-04-22 RX ADMIN — LIDOCAINE HYDROCHLORIDE 40 ML: 5 INJECTION, SOLUTION INFILTRATION at 09:54

## 2021-04-22 RX ADMIN — FAMOTIDINE 20 MG: 20 TABLET, FILM COATED ORAL at 08:27

## 2021-04-22 RX ADMIN — HYDROMORPHONE HYDROCHLORIDE 0.5 MG: 1 INJECTION, SOLUTION INTRAMUSCULAR; INTRAVENOUS; SUBCUTANEOUS at 11:03

## 2021-04-22 RX ADMIN — PROPOFOL 100 MCG/KG/MIN: 10 INJECTION, EMULSION INTRAVENOUS at 09:57

## 2021-04-22 NOTE — OP NOTES
Hand Surgery Operative Note      Tyler Berger   63 y.o.   female      Pre-op diagnosis: Right Carpal Tunnel syndrome  Post op diagnosis: same      Procedure: Right Carpal Tunnel release cpt 26129      Surgeon: Devan Roberts MD, PhD      Anesthesia: [place] block      Tourniquet time:   Total Tourniquet Time Documented:  Forearm (Right) - 21 minutes  Total: Forearm (Right) - 21 minutes        Procedure indications: Patient with radial digit numbness recalcitrant to conservative measures with positive documentation of NCV findings consistent with carpal tunnel syndrome. After Thorough discussion, the patient decided to proceed with surgical management. We discussed in detail surgical risks including scar, pain, bleeding, infection, anesthetic risks, neurovascular injury, need for further surgery,  weakness, stiffness, risk of death and potential risk of other unforseen complication. Procedure description:      Patient was placed in the supine position and after appropriate time-out and side, site and procedure confirmed. The incision was made in the palm in line with the radial border of the ring finger under loupe magnification and palmar fascia was incised longitudinally. Blunt retraction used to identify the transverse carpal ligament, which was incised, visualizing the median nerve beneath, which was protected with a slotted freer. The remaining ligament was released with a Samish meniscal blade under direct visualization. The ligament was released in its entirety, and visualized at its most proximal and distal extent. Wound was irrigated, tourniquet released and hemostasis was obtained with bipolar cautery. Skin edges were infiltrated with . 25% Bupivacaine. Wound then closed with 4-0 rapide, glue and sterile dressing applied. Disposition: To PACU with no complications and follow up per routine.   Patient is instructed to remove dressings in five days and other precautions include avoidance of heavy and repetitive lifting for 2 weeks, when an appointment for follow up and suture removal will take place.      Rachael Contreras MD  04/22/21  10:42 AM

## 2021-04-22 NOTE — DISCHARGE INSTRUCTIONS
Postoperative  Instructions:      Weightbearing or Lifting:  Limit  weight  lifting  to  less  than  1  pound  (coffee  mug)  for  the  first  2  weeks  after  surgery. Dressing  instructions:    Keep  your  dressing  and/or  splint  clean  and  dry  at  all  times. You  can  remove  your  dressing  on  post-operative  day  #7  and  change  with  a  dry/sterile  dressing  or  Band-Aids  as  needed  thereafter. Showering  Instructions:  May  shower  But keep surgical dressing clean and dry until removed as explained above. After dressing is removed, you may allow soapy water to run through the incision during showers but do not scrub. After each shower, pat dry and apply a dry dressing. Do  not  soak  your  Incision in still water or bathtub  for  3  weeks  after  surgery. If  the  incision  gets  wet otherwise,  pat  dry  and  do  not  scrub  the  incision. Do  not  apply  cream  or  lotion  to  incision      Pain  Control:  - You  have  been  given  a  prescription  to  be  taken  as  directed  for  post-operative  pain  control. In  addition,  elevate  the  operative  extremity  above  the  heart  at  all  times  to  prevent  swelling  and  throbbing  pain. - If you develop constipation while taking narcotic pain medications (Norco, Hydrocodone, Percocet, Oxycodone, Dilaudid, Hydromorphone) take  over-the-counter  Colace,  100mg  by  mouth  twice  a  Day. - Nausea  is  a  common  side  effect  of  many  pain  medications. You  will  want  to  eat something  before  taking  your  pain  medicine  to  help  prevent  Nausea. - If  you  are  taking  a  prescription  pain  medication  that  contains  acetaminophen,  we  recommend  that  you  do  not  take  additional  over  the  counter  acetaminophen  (Tylenol®).       Other  pain  relieving  options:   - Using  a  cold  pack  to  ice  the  affected  area  a  few  times  a  day  (15  to  20  minutes  at  a  time)  can  help  to  relieve pain,  reduce  swelling  and  bruising.      - Elevation  of  the  affected  area  can  also  help  to  reduce  pain  and  swelling. Did  you  receive  a  nerve  Block? A  nerve  block  can  provide  pain  relief  for  one  hour  to  two  days  after  your  surgery. As  long  as  the  nerve  block  is  working,  you  will  experience  little  or  no  sensation  in  the  area  the  surgeon  operated  on. As  the  nerve  block  wears  off,  you  will  begin  to  experience  pain  or  discomfort. It  is  very  important  that  you  begin  taking  your  prescribed  pain  medication  before  the  nerve  block  fully  wears  off. The first sign that the nerve block is wearing off is tingling in your fingers. Treating  your  pain  at  the  first  sign  of  the  block  wearing  off  will  ensure  your  pain  is  better  controlled  and  more  tolerable  when  full-sensation  returns. Do  not  wait  until  the  pain  is  intolerable,  as  the  medicine  will  be  less  effective. It  is  better  to  treat  pain  in  advance  than  to  try  and  catch  up. General  Anesthesia or Sedation:      If  you  did  not  receive  a  nerve  block  during  your  surgery,  you  will  need  to  start  taking  your  pain  medication  shortly  after  your  surgery  and  should  continue  to  do  so  as  prescribed  by  your  surgeon. Please  call  [phone]  with any concern and ask to speak with Sophia Navas. Concerning problems include:      -  Excessive  redness  of  the  incisions      -  Drainage  for  more  than  2  Days after surgery or any foul smelling drainage  -  Fever  of  more  than  101.5  F      Please  call  [phone]  if  you  do  not  receive  or  are  unsure  of  your  first  follow-up  appointment. You  should  see  the  doctor  10-14  days  after  your  Surgery. Thank you for choosing me and [address] [place] [address] for your care.  I will go above and beyond to ensure you receive the best care possible. Noah Betancourt MD, PhD  .     DIET  · Clear liquids until no nausea or vomiting; then light diet for the first day. · Advance to regular diet on second day, unless your doctor orders otherwise. · If nausea and vomiting continues, call your doctor. After general anesthesia or intravenous sedation, for 24 hours or while taking prescription Narcotics:  · Limit your activities  · Do not drive and operate hazardous machinery  · Do not make important personal or business decisions  · Do  not drink alcoholic beverages  · If you have not urinated within 8 hours after discharge, please contact your surgeon on call. *  Please give a list of your current medications to your Primary Care Provider. *  Please update this list whenever your medications are discontinued, doses are      changed, or new medications (including over-the-counter products) are added. *  Please carry medication information at all times in case of emergency situations. These are general instructions for a healthy lifestyle:    No smoking/ No tobacco products/ Avoid exposure to second hand smoke    Surgeon General's Warning:  Quitting smoking now greatly reduces serious risk to your health. Obesity, smoking, and sedentary lifestyle greatly increases your risk for illness    A healthy diet, regular physical exercise & weight monitoring are important for maintaining a healthy lifestyle    You may be retaining fluid if you have a history of heart failure or if you experience any of the following symptoms:  Weight gain of 3 pounds or more overnight or 5 pounds in a week, increased swelling in our hands or feet or shortness of breath while lying flat in bed. Please call your doctor as soon as you notice any of these symptoms; do not wait until your next office visit.     Recognize signs and symptoms of STROKE:    F-face looks uneven    A-arms unable to move or move unevenly    S-speech slurred or non-existent    T-time-call 911 as soon as signs and symptoms begin-DO NOT go       Back to bed or wait to see if you get better-TIME IS BRAIN.

## 2021-04-22 NOTE — ANESTHESIA PROCEDURE NOTES
Peripheral Block    Start time: 4/22/2021 9:51 AM  End time: 4/22/2021 10:18 AM  Performed by: Chetna Hsu CRNA  Authorized by: Flavia Schmitz MD       Pre-procedure:    Indications: at surgeon's request, procedure for pain and primary anesthetic    Preanesthetic Checklist: patient identified, risks and benefits discussed, site marked, timeout performed, anesthesia consent given and patient being monitored    Timeout Time: 09:51          Block Type:   Block Type:  Cristina block  Laterality:  Right  Medication Injected:  Lidocaine (PF) (XYLOCAINE) 5 mg/mL (0.5 %) injection, 40 mL  Med Admin Time: 4/22/2021 9:54 AM  Patient Position:  Supine  Block Limb IV Checked: Yes    Esmarch exsanguination: Yes    Tourniquet Type:  Single  Tourniquet Location:  Below elbow  Tourniquet Inflated:  4/22/2021 9:53 AM  Inflation (mmHg):  250  Limb w/o Radial Pulse: Yes    Infused Agent:  Lidocaine 0.5%  Volume Infused (mL):  40  Tourniquet Deflated:  4/22/2021 10:18 AM    Assessment:    Injection Assessment:   Patient tolerance:  Patient tolerated the procedure well with no immediate complications

## 2021-04-22 NOTE — INTERVAL H&P NOTE
H&P Update: 
Manjit Garcia was seen and examined. History and physical has been reviewed. The patient has been examined. There have been no significant clinical changes since the completion of the originally dated History and Physical. 
 
Kush Alvarenga MD 
Orthopaedic Surgery 04/22/21 
8:57 AM

## 2021-04-22 NOTE — ANESTHESIA POSTPROCEDURE EVALUATION
Procedure(s):  RIGHT CARPAL TUNNEL RELEASE.    regional    Anesthesia Post Evaluation      Multimodal analgesia: multimodal analgesia used between 6 hours prior to anesthesia start to PACU discharge  Patient location during evaluation: bedside  Patient participation: complete - patient participated  Level of consciousness: awake and alert  Pain management: adequate  Airway patency: patent  Anesthetic complications: no  Cardiovascular status: hemodynamically stable  Respiratory status: spontaneous ventilation  Hydration status: euvolemic  Comments: Patient stable and may discharge at this time. Post anesthesia nausea and vomiting:  none  Final Post Anesthesia Temperature Assessment:  Normothermia (36.0-37.5 degrees C)      INITIAL Post-op Vital signs:   Vitals Value Taken Time   /75 04/22/21 1047   Temp 36.6 °C (97.8 °F) 04/22/21 1029   Pulse 57 04/22/21 1056   Resp 15 04/22/21 1029   SpO2 95 % 04/22/21 1056   Vitals shown include unvalidated device data.

## 2021-04-23 NOTE — THERAPY DISCHARGE
Pamela Drew  : 1960  Primary: [employer]  Secondary:  [address]  at [address] [facility] [address], [address] [facility] [place], [address]  [phone]   [phone]       OUTPATIENT OCCUPATIONAL THERAPY:Discontinuation Summary 2021   ICD-10: Treatment Diagnosis: Pain in right wrist (M25.531)    Precautions/Allergies:   Tylox [oxycodone-acetaminophen]   TREATMENT PLAN:  Effective Dates: 2020 TO 2021 (90 days). Frequency/Duration: 1-2 times a week for 90 Day(s) MEDICAL/REFERRING DIAGNOSIS:  Other intraarticular fracture of lower end of right radius, initial encounter for closed fracture [S52.571A]   DATE OF ONSET: 10/29/20  REFERRING PHYSICIAN: Monica Nevarez MD MD Orders: OT evaluate and treat. Return MD Appointment: Pending. DISCONTINUATION SUMMARY:  Hawk Schmid has been seen in occupational therapy from 20 to 21 for 4 visits. Treatment has been discontinued at this time due to patient failing to return for additional treatment. The below goals were met prior to discontinuation. Some goals were not met due to inability to re-asses. Thank you for this referral.        GOALS: (Goals have been discussed and agreed upon with patient.)  Short-Term Functional Goals: Time Frame: 4 weeks  1. Decrease pain to moderate or less per Quick-DASH to allow patient to perform self care tasks. Not assessed this date. 2. Increase motion in R wrist extension flexion by 30 degrees to improve functional use of upper extremity in ADL activities. Not met. 3. Increase motion in fingers to full composite flexion to allow patient to  and lift objects during self care activities. Met. Discharge Goals: Time Frame: 12 weeks  1. Decrease pain to mild or less per Quick-DASH to allow patient to perform all household and work tasks. Not assessed this date.    2. Increase motion in R wrist extension flexion by 60 degreees to allow patient to perform all ADL activities. Not met. 3. Increase strength in R  by 30 pounds to allow patient to , lift, hold, and carry objects. Not met. OUTCOME MEASURE:   Tool Used: Disabilities of the Arm, Shoulder and Hand (DASH) Questionnaire - Quick Version  Score:  Initial: 50/55  Most Recent: X/55 (Date: -- )   Interpretation of Score: The DASH is designed to measure the activities of daily living in person's with upper extremity dysfunction or pain. Each section is scored on a 1-5 scale, 5 representing the greatest disability. The scores of each section are added together for a total score of 55.       Thank you for this referral,  RAHEEM Riley, OTR/L

## 2022-03-19 PROBLEM — M47.814 THORACIC SPONDYLOSIS: Status: ACTIVE | Noted: 2017-01-27

## 2022-03-19 PROBLEM — F11.90 CHRONIC, CONTINUOUS USE OF OPIOIDS: Status: ACTIVE | Noted: 2018-02-21

## 2022-03-20 PROBLEM — S52.571A OTHER INTRAARTICULAR FRACTURE OF LOWER END OF RIGHT RADIUS, INITIAL ENCOUNTER FOR CLOSED FRACTURE: Status: ACTIVE | Noted: 2020-11-01

## 2022-07-07 RX ORDER — TOPIRAMATE 25 MG/1
TABLET ORAL
Qty: 90 TABLET | OUTPATIENT
Start: 2022-07-07

## 2022-07-07 RX ORDER — FLUTICASONE PROPIONATE 50 MCG
SPRAY, SUSPENSION (ML) NASAL
Qty: 32 G | OUTPATIENT
Start: 2022-07-07

## 2022-08-09 ENCOUNTER — TELEPHONE (OUTPATIENT)
Dept: FAMILY MEDICINE CLINIC | Facility: CLINIC | Age: 62
End: 2022-08-09

## 2022-08-09 NOTE — TELEPHONE ENCOUNTER
Patient is needing the following refills,   Glipizide 2.5 mg  Singulair 10 mg  Lexapro     She has an appointment on Sept 6. Patient needs this sent to the [place] [id number].    Thank you

## 2022-08-15 ENCOUNTER — TELEPHONE (OUTPATIENT)
Dept: FAMILY MEDICINE CLINIC | Facility: CLINIC | Age: 62
End: 2022-08-15

## 2022-08-15 NOTE — TELEPHONE ENCOUNTER
[facility] pharmacy called regarding Basaglar:    -shows 2 sig take 40 units and 30 units    Please advise which one is correct.

## 2022-08-22 NOTE — TELEPHONE ENCOUNTER
Pt req refills on glipizide and singulair. Rx's pended. Also pt would like a refill on citalopram, unable to find med on pt med list. Pt states you have been giving it to her.  Pt will be going out of town on a cruise on 8-27-22 and next appt is 9-6-22

## 2022-08-23 RX ORDER — GLIPIZIDE 2.5 MG/1
2.5 TABLET, EXTENDED RELEASE ORAL DAILY
Qty: 30 TABLET | Refills: 1 | Status: SHIPPED | OUTPATIENT
Start: 2022-08-23 | End: 2022-09-06 | Stop reason: SDUPTHER

## 2022-08-23 RX ORDER — MONTELUKAST SODIUM 10 MG/1
10 TABLET ORAL DAILY
Qty: 30 TABLET | Refills: 1 | Status: SHIPPED | OUTPATIENT
Start: 2022-08-23 | End: 2022-09-06 | Stop reason: SDUPTHER

## 2022-08-31 RX ORDER — GLIPIZIDE 2.5 MG/1
TABLET, EXTENDED RELEASE ORAL
Qty: 90 TABLET | OUTPATIENT
Start: 2022-08-31

## 2022-09-06 ENCOUNTER — OFFICE VISIT (OUTPATIENT)
Dept: FAMILY MEDICINE CLINIC | Facility: CLINIC | Age: 62
End: 2022-09-06
Payer: COMMERCIAL

## 2022-09-06 ENCOUNTER — TELEPHONE (OUTPATIENT)
Dept: FAMILY MEDICINE CLINIC | Facility: CLINIC | Age: 62
End: 2022-09-06

## 2022-09-06 VITALS
TEMPERATURE: 98.1 F | SYSTOLIC BLOOD PRESSURE: 166 MMHG | BODY MASS INDEX: 36.48 KG/M2 | WEIGHT: 185.8 LBS | HEIGHT: 60 IN | DIASTOLIC BLOOD PRESSURE: 69 MMHG | HEART RATE: 54 BPM | OXYGEN SATURATION: 99 %

## 2022-09-06 DIAGNOSIS — E11.9 TYPE 2 DIABETES MELLITUS WITHOUT COMPLICATION, WITHOUT LONG-TERM CURRENT USE OF INSULIN (HCC): Primary | ICD-10-CM

## 2022-09-06 DIAGNOSIS — R51.9 NONINTRACTABLE HEADACHE, UNSPECIFIED CHRONICITY PATTERN, UNSPECIFIED HEADACHE TYPE: ICD-10-CM

## 2022-09-06 DIAGNOSIS — Z12.39 BREAST SCREENING: ICD-10-CM

## 2022-09-06 DIAGNOSIS — E78.2 MIXED HYPERLIPIDEMIA: ICD-10-CM

## 2022-09-06 DIAGNOSIS — J45.909 MILD REACTIVE AIRWAYS DISEASE, UNSPECIFIED WHETHER PERSISTENT: ICD-10-CM

## 2022-09-06 DIAGNOSIS — F33.0 MAJOR DEPRESSIVE DISORDER, RECURRENT, MILD (HCC): ICD-10-CM

## 2022-09-06 DIAGNOSIS — K21.9 GASTROESOPHAGEAL REFLUX DISEASE WITHOUT ESOPHAGITIS: ICD-10-CM

## 2022-09-06 DIAGNOSIS — E66.01 SEVERE OBESITY (BMI 35.0-39.9) WITH COMORBIDITY (HCC): ICD-10-CM

## 2022-09-06 PROBLEM — F33.9 MAJOR DEPRESSIVE DISORDER, RECURRENT, UNSPECIFIED (HCC): Status: ACTIVE | Noted: 2022-09-06

## 2022-09-06 PROBLEM — F33.1 MAJOR DEPRESSIVE DISORDER, RECURRENT, MODERATE (HCC): Status: ACTIVE | Noted: 2022-09-06

## 2022-09-06 LAB
ALBUMIN SERPL-MCNC: 3.8 G/DL (ref 3.2–4.6)
ALBUMIN/GLOB SERPL: 1 {RATIO} (ref 1.2–3.5)
ALP SERPL-CCNC: 109 U/L (ref 50–136)
ALT SERPL-CCNC: 28 U/L (ref 12–65)
ANION GAP SERPL CALC-SCNC: 6 MMOL/L (ref 4–13)
AST SERPL-CCNC: 24 U/L (ref 15–37)
BASOPHILS # BLD: 0 K/UL (ref 0–0.2)
BASOPHILS NFR BLD: 0 % (ref 0–2)
BILIRUB SERPL-MCNC: 0.7 MG/DL (ref 0.2–1.1)
BUN SERPL-MCNC: 9 MG/DL (ref 8–23)
CALCIUM SERPL-MCNC: 9.4 MG/DL (ref 8.3–10.4)
CHLORIDE SERPL-SCNC: 106 MMOL/L (ref 101–110)
CHOLEST SERPL-MCNC: 195 MG/DL
CO2 SERPL-SCNC: 27 MMOL/L (ref 21–32)
CREAT SERPL-MCNC: 0.6 MG/DL (ref 0.6–1)
DIFFERENTIAL METHOD BLD: NORMAL
EOSINOPHIL # BLD: 0.2 K/UL (ref 0–0.8)
EOSINOPHIL NFR BLD: 4 % (ref 0.5–7.8)
ERYTHROCYTE [DISTWIDTH] IN BLOOD BY AUTOMATED COUNT: 13.6 % (ref 11.9–14.6)
GLOBULIN SER CALC-MCNC: 4 G/DL (ref 2.3–3.5)
GLUCOSE SERPL-MCNC: 106 MG/DL (ref 65–100)
HBA1C MFR BLD: 6.6 %
HCT VFR BLD AUTO: 41.8 % (ref 35.8–46.3)
HDLC SERPL-MCNC: 62 MG/DL (ref 40–60)
HDLC SERPL: 3.1 {RATIO}
HGB BLD-MCNC: 13.5 G/DL (ref 11.7–15.4)
IMM GRANULOCYTES # BLD AUTO: 0 K/UL (ref 0–0.5)
IMM GRANULOCYTES NFR BLD AUTO: 0 % (ref 0–5)
LDLC SERPL CALC-MCNC: 109.6 MG/DL
LYMPHOCYTES # BLD: 1.4 K/UL (ref 0.5–4.6)
LYMPHOCYTES NFR BLD: 27 % (ref 13–44)
MAGNESIUM SERPL-MCNC: 2.7 MG/DL (ref 1.8–2.4)
MCH RBC QN AUTO: 30.2 PG (ref 26.1–32.9)
MCHC RBC AUTO-ENTMCNC: 32.3 G/DL (ref 31.4–35)
MCV RBC AUTO: 93.5 FL (ref 79.6–97.8)
MONOCYTES # BLD: 0.3 K/UL (ref 0.1–1.3)
MONOCYTES NFR BLD: 6 % (ref 4–12)
NEUTS SEG # BLD: 3.3 K/UL (ref 1.7–8.2)
NEUTS SEG NFR BLD: 63 % (ref 43–78)
NRBC # BLD: 0 K/UL (ref 0–0.2)
PLATELET # BLD AUTO: 317 K/UL (ref 150–450)
PMV BLD AUTO: 10.3 FL (ref 9.4–12.3)
POTASSIUM SERPL-SCNC: 3.7 MMOL/L (ref 3.5–5.1)
PROT SERPL-MCNC: 7.8 G/DL (ref 6.3–8.2)
RBC # BLD AUTO: 4.47 M/UL (ref 4.05–5.2)
SODIUM SERPL-SCNC: 139 MMOL/L (ref 136–145)
TRIGL SERPL-MCNC: 117 MG/DL (ref 35–150)
VLDLC SERPL CALC-MCNC: 23.4 MG/DL (ref 6–23)
WBC # BLD AUTO: 5.3 K/UL (ref 4.3–11.1)

## 2022-09-06 PROCEDURE — 83036 HEMOGLOBIN GLYCOSYLATED A1C: CPT | Performed by: NURSE PRACTITIONER

## 2022-09-06 PROCEDURE — 99214 OFFICE O/P EST MOD 30 MIN: CPT | Performed by: NURSE PRACTITIONER

## 2022-09-06 RX ORDER — CITALOPRAM 20 MG/1
TABLET ORAL
Qty: 135 TABLET | Refills: 1 | Status: SHIPPED | OUTPATIENT
Start: 2022-09-06

## 2022-09-06 RX ORDER — TOPIRAMATE 25 MG/1
25 TABLET ORAL DAILY
Qty: 90 TABLET | Refills: 0 | Status: SHIPPED | OUTPATIENT
Start: 2022-09-06

## 2022-09-06 RX ORDER — ONDANSETRON 4 MG/1
4 TABLET, FILM COATED ORAL EVERY 8 HOURS PRN
Qty: 15 TABLET | Refills: 1 | Status: SHIPPED | OUTPATIENT
Start: 2022-09-06

## 2022-09-06 RX ORDER — OMEPRAZOLE 20 MG/1
20 CAPSULE, DELAYED RELEASE ORAL DAILY
Qty: 90 CAPSULE | Refills: 1 | Status: SHIPPED | OUTPATIENT
Start: 2022-09-06

## 2022-09-06 RX ORDER — GLIPIZIDE 2.5 MG/1
2.5 TABLET, EXTENDED RELEASE ORAL DAILY
Qty: 90 TABLET | Refills: 1 | Status: SHIPPED | OUTPATIENT
Start: 2022-09-06

## 2022-09-06 RX ORDER — CITALOPRAM 20 MG/1
TABLET ORAL
COMMUNITY
Start: 2021-02-18 | End: 2022-09-06 | Stop reason: SDUPTHER

## 2022-09-06 RX ORDER — MONTELUKAST SODIUM 10 MG/1
10 TABLET ORAL DAILY
Qty: 90 TABLET | Refills: 1 | Status: SHIPPED | OUTPATIENT
Start: 2022-09-06

## 2022-09-06 RX ORDER — ALBUTEROL SULFATE 2.5 MG/3ML
2.5 SOLUTION RESPIRATORY (INHALATION) EVERY 6 HOURS PRN
Qty: 120 EACH | Refills: 1 | Status: SHIPPED | OUTPATIENT
Start: 2022-09-06

## 2022-09-06 SDOH — ECONOMIC STABILITY: TRANSPORTATION INSECURITY
IN THE PAST 12 MONTHS, HAS LACK OF TRANSPORTATION KEPT YOU FROM MEETINGS, WORK, OR FROM GETTING THINGS NEEDED FOR DAILY LIVING?: NO

## 2022-09-06 SDOH — ECONOMIC STABILITY: TRANSPORTATION INSECURITY
IN THE PAST 12 MONTHS, HAS THE LACK OF TRANSPORTATION KEPT YOU FROM MEDICAL APPOINTMENTS OR FROM GETTING MEDICATIONS?: NO

## 2022-09-06 SDOH — ECONOMIC STABILITY: FOOD INSECURITY: WITHIN THE PAST 12 MONTHS, YOU WORRIED THAT YOUR FOOD WOULD RUN OUT BEFORE YOU GOT MONEY TO BUY MORE.: SOMETIMES TRUE

## 2022-09-06 SDOH — ECONOMIC STABILITY: FOOD INSECURITY: WITHIN THE PAST 12 MONTHS, THE FOOD YOU BOUGHT JUST DIDN'T LAST AND YOU DIDN'T HAVE MONEY TO GET MORE.: SOMETIMES TRUE

## 2022-09-06 ASSESSMENT — ENCOUNTER SYMPTOMS
GASTROINTESTINAL NEGATIVE: 1
RESPIRATORY NEGATIVE: 1
EYES NEGATIVE: 1
ALLERGIC/IMMUNOLOGIC NEGATIVE: 1

## 2022-09-06 ASSESSMENT — PATIENT HEALTH QUESTIONNAIRE - PHQ9
SUM OF ALL RESPONSES TO PHQ QUESTIONS 1-9: 0
2. FEELING DOWN, DEPRESSED OR HOPELESS: 0
1. LITTLE INTEREST OR PLEASURE IN DOING THINGS: 0
SUM OF ALL RESPONSES TO PHQ9 QUESTIONS 1 & 2: 0

## 2022-09-06 ASSESSMENT — SOCIAL DETERMINANTS OF HEALTH (SDOH): HOW HARD IS IT FOR YOU TO PAY FOR THE VERY BASICS LIKE FOOD, HOUSING, MEDICAL CARE, AND HEATING?: SOMEWHAT HARD

## 2022-09-06 ASSESSMENT — LIFESTYLE VARIABLES
HOW OFTEN DO YOU HAVE A DRINK CONTAINING ALCOHOL: NEVER
HOW MANY STANDARD DRINKS CONTAINING ALCOHOL DO YOU HAVE ON A TYPICAL DAY: PATIENT DOES NOT DRINK

## 2022-09-06 NOTE — PROGRESS NOTES
[address]  [address] [facility] [place], [address] [facility]   (ph) [phone] (fax) [phone]  AIRAM AllenP-C      Chief Complaint   Patient presents with    Follow-up    Diabetes    Medication Refill       58 yo female comes into the office to follow up on chronic conditions and to get med refills. Feels Citalopram  is working well and recently returned from a cruise. She is in good spirits today.        Allergies   Allergen Reactions    Oxycodone-Acetaminophen Rash       Past Medical History:   Diagnosis Date    Arthritis     back, shoulders, hand    Asthma     allergy triggered    Depression     anxiety    Diabetes ([address]     \"borderline\" fbs 109-125    GERD (gastroesophageal reflux disease)     medications prn    History of degenerative disc disease        Family History   Problem Relation Age of Onset    Breast Cancer Maternal Grandmother 76    Breast Cancer Maternal Aunt 79    Breast Cancer Other     Diabetes Mother     Pulmonary Embolism Sister     Stroke Mother     Breast Cancer Mother 72    Diabetes Father     Stroke Father        Social History     Socioeconomic History    Marital status:      Spouse name: Not on file    Number of children: Not on file    Years of education: Not on file    Highest education level: Not on file   Occupational History    Not on file   Tobacco Use    Smoking status: Never    Smokeless tobacco: Never    Tobacco comments:     Quit smokin years ago   Substance and Sexual Activity    Alcohol use: No    Drug use: No    Sexual activity: Not on file   Other Topics Concern    Not on file   Social History Narrative    Not on file     Social Determinants of Health     Financial Resource Strain: Medium Risk    Difficulty of Paying Living Expenses: Somewhat hard   Food Insecurity: Food Insecurity Present    Worried About Running Out of Food in the Last Year: Sometimes true    Ran Out of Food in the Last Year: Sometimes true   Transportation Needs: No Transportation Needs    Lack of Transportation (Medical): No    Lack of Transportation (Non-Medical): No   Physical Activity: Not on file   Stress: Not on file   Social Connections: Not on file   Intimate Partner Violence: Not on file   Housing Stability: Not on file       OB History    No obstetric history on file.          Past Surgical History:   Procedure Laterality Date    CARPAL TUNNEL RELEASE  04/26/2021    Right hand    CHOLECYSTECTOMY      COLONOSCOPY      GASTRIC BYPASS SURGERY  2012    HERNIA REPAIR  2016 August Thursday 19 2016;     ORTHOPEDIC SURGERY Right     arm    ORTHOPEDIC SURGERY Right     shoulder surgery x 3    ORTHOPEDIC SURGERY Left     shoulder surgery     OTHER SURGICAL HISTORY      patient reports spinal ablation        Health Maintenance   Topic Date Due    HIV screen  Never done    Diabetic retinal exam  Never done    Cervical cancer screen  Never done    Shingles vaccine (1 of 2) Never done    Pneumococcal 0-64 years Vaccine (2 - PCV) 02/18/2022    COVID-19 Vaccine (4 - Booster for Pfizer series) 02/19/2022    Breast cancer screen  03/17/2022    Flu vaccine (1) 09/01/2022    Diabetic foot exam  03/29/2023    A1C test (Diabetic or Prediabetic)  03/29/2023    Diabetic microalbuminuria test  03/29/2023    Lipids  03/29/2023    Annual Wellness Visit (AWV)  03/30/2023    Depression Monitoring  09/06/2023    Colorectal Cancer Screen  02/18/2024    DTaP/Tdap/Td vaccine (2 - Td or Tdap) 01/21/2030    Hepatitis C screen  Completed    Hepatitis A vaccine  Aged Out    Hib vaccine  Aged Out    Meningococcal (ACWY) vaccine  Aged Out         Current Outpatient Medications:     glipiZIDE (GLUCOTROL XL) 2.5 MG extended release tablet, Take 1 tablet by mouth daily, Disp: 90 tablet, Rfl: 1    montelukast (SINGULAIR) 10 MG tablet, Take 1 tablet by mouth daily, Disp: 90 tablet, Rfl: 1    albuterol (PROVENTIL) (2.5 MG/3ML) 0.083% nebulizer solution, Take 3 mLs by nebulization every 6 hours as needed for Wheezing or Shortness of Breath, Disp: 120 each, Rfl: 1    citalopram (CELEXA) 20 MG tablet, Take 1  1/2 po daily, Disp: 135 tablet, Rfl: 1    omeprazole (PRILOSEC) 20 MG delayed release capsule, Take 1 capsule by mouth daily, Disp: 90 capsule, Rfl: 1    ondansetron (ZOFRAN) 4 MG tablet, Take 1 tablet by mouth every 8 hours as needed for Nausea, Disp: 15 tablet, Rfl: 1    topiramate (TOPAMAX) 25 MG tablet, Take 1 tablet by mouth daily 1 tab po daily, Disp: 90 tablet, Rfl: 0    albuterol sulfate  (90 Base) MCG/ACT inhaler, Inhale 2 puffs into the lungs every 6 hours as needed, Disp: , Rfl:     budesonide-formoterol (SYMBICORT) 80-4.5 MCG/ACT AERO, Inhale 2 puffs into the lungs 2 times daily, Disp: , Rfl:     vitamin D (CHOLECALCIFEROL) 25 MCG (1000 UT) TABS tablet, Take 1,000 Units by mouth daily, Disp: , Rfl:     diclofenac sodium (VOLTAREN) 1 % GEL, Apply topically, Disp: , Rfl:     fluticasone (FLONASE) 50 MCG/ACT nasal spray, 2 sprays by Nasal route daily, Disp: , Rfl:     HYDROcodone-acetaminophen (NORCO)  MG per tablet, Take 1 tablet by mouth every 6 hours as needed. , Disp: , Rfl:     naloxone 4 MG/0.1ML LIQD nasal spray, 4 mg by Nasal route as needed, Disp: , Rfl:     thiamine 100 MG tablet, Take 100 mg by mouth daily, Disp: , Rfl:     gabapentin (NEURONTIN) 300 MG capsule, Take 300 mg by mouth. (Patient not taking: Reported on 9/6/2022), Disp: , Rfl:     Review of Systems   Constitutional: Negative. HENT: Negative. Eyes: Negative. Respiratory: Negative. Cardiovascular: Negative. Gastrointestinal: Negative. Endocrine: Negative. Genitourinary: Negative. Musculoskeletal: Negative. Skin: Negative. Allergic/Immunologic: Negative. Neurological: Negative. Hematological: Negative. Psychiatric/Behavioral: Negative.         Vitals:    09/06/22 0959   BP: (!) 166/69   Pulse: 54   Temp:    SpO2: 99%        Physical Exam  Constitutional:       Appearance: Normal appearance. HENT:      Head: Normocephalic. Nose: Nose normal.   Eyes:      Extraocular Movements: Extraocular movements intact. Pupils: Pupils are equal, round, and reactive to light. Cardiovascular:      Rate and Rhythm: Normal rate and regular rhythm. Pulmonary:      Effort: Pulmonary effort is normal.      Breath sounds: Normal breath sounds. Abdominal:      General: Abdomen is flat. Palpations: Abdomen is soft. Musculoskeletal:         General: Normal range of motion. Cervical back: Normal range of motion and neck supple. Skin:     General: Skin is warm and dry. Neurological:      General: No focal deficit present. Mental Status: She is alert and oriented to person, place, and time. Psychiatric:         Mood and Affect: Mood normal.         Behavior: Behavior normal.              Assessment & Plan:    1. Type 2 diabetes mellitus without complication, without long-term current use of insulin ([facility])  Stable; continue following meds  - glipiZIDE (GLUCOTROL XL) 2.5 MG extended release tablet; Take 1 tablet by mouth daily  Dispense: 90 tablet; Refill: 1  - Comprehensive Metabolic Panel; Future  - CBC with Auto Differential; Future  - AMB POC HEMOGLOBIN A1C  - CBC with Auto Differential  - Comprehensive Metabolic Panel    2. Major depressive disorder, recurrent, mild  Stable; continue med  - citalopram (CELEXA) 20 MG tablet; Take 1  1/2 po daily  Dispense: 135 tablet; Refill: 1    3. Severe obesity (BMI 35.0-39. 9) with comorbidity ([address]  Stable; been on cruise and ate more recently; will get back on diabetic diet    4. Mixed hyperlipidemia  stable  - Lipid Panel; Future  - CBC with Auto Differential; Future  - CBC with Auto Differential  - Lipid Panel    5. Gastroesophageal reflux disease without esophagitis  Stable; continue Omeprazole  - omeprazole (PRILOSEC) 20 MG delayed release capsule;  Take 1 capsule by mouth daily  Dispense: 90 capsule; Refill: 1  - Magnesium; Future  - Magnesium    6. Breast screening  - [facility] DIGITAL SCREEN W OR WO CAD BILATERAL; Future    7. Mild reactive airways disease, unspecified whether persistent  Stable; continue meds  - montelukast (SINGULAIR) 10 MG tablet; Take 1 tablet by mouth daily  Dispense: 90 tablet; Refill: 1  - albuterol (PROVENTIL) (2.5 MG/3ML) 0.083% nebulizer solution; Take 3 mLs by nebulization every 6 hours as needed for Wheezing or Shortness of Breath  Dispense: 120 each; Refill: 1    8. Nonintractable headache, unspecified chronicity pattern, unspecified headache type  Stable; continue meds  - ondansetron (ZOFRAN) 4 MG tablet; Take 1 tablet by mouth every 8 hours as needed for Nausea  Dispense: 15 tablet; Refill: 1 (headache causes nausea)  - topiramate (TOPAMAX) 25 MG tablet; Take 1 tablet by mouth daily Dispense: 90 tablet; Refill: 0      Greater than 50% counseling and/or coordination of care: the treatment regimen is extensive; detailed review. Will notify of labs. P&P in 3 months at next visit. This note was dictated using dragon voice recognition software. It has been proofread, but there may still exist voice recognition errors that the author did not detect.       Signed By: MARIA EUGENIA Larsen - CNP     September 6, 2022

## 2022-09-14 DIAGNOSIS — L98.9 SKIN ABNORMALITIES: Primary | ICD-10-CM

## 2022-09-14 RX ORDER — CLOTRIMAZOLE AND BETAMETHASONE DIPROPIONATE 10; .64 MG/G; MG/G
CREAM TOPICAL
Qty: 30 G | Refills: 0 | Status: SHIPPED | OUTPATIENT
Start: 2022-09-14

## 2022-09-29 ENCOUNTER — HOSPITAL ENCOUNTER (OUTPATIENT)
Dept: MAMMOGRAPHY | Age: 62
Discharge: HOME | End: 2022-10-02
Payer: MEDICARE

## 2022-09-29 DIAGNOSIS — Z12.39 BREAST SCREENING: ICD-10-CM

## 2022-09-29 PROCEDURE — 77067 SCR MAMMO BI INCL CAD: CPT

## 2022-10-07 RX ORDER — CLOTRIMAZOLE AND BETAMETHASONE DIPROPIONATE 10; .64 MG/G; MG/G
CREAM TOPICAL
Qty: 30 G | Refills: 0 | OUTPATIENT
Start: 2022-10-07

## 2022-10-20 ENCOUNTER — TELEPHONE (OUTPATIENT)
Dept: FAMILY MEDICINE CLINIC | Facility: CLINIC | Age: 62
End: 2022-10-20

## 2022-10-20 NOTE — TELEPHONE ENCOUNTER
----- Message from [facility] sent at 10/20/2022  3:28 PM EDT -----  Regarding: Sinus infectoion  Bre Band I took a covid test to make sure but I haven't been around anybody anyway it was negative but like but lights are hurting my eyes really bad lots of pressure behind my eyes drainage drainage down my throat I am 99.99% sure I have a  Sign us infection

## 2022-10-20 NOTE — TELEPHONE ENCOUNTER
Called patient- no appointments available. Patient advised to go to urgent care if needed or call the office the next day to check for cancellations or work in appointments per provider.

## 2022-11-14 ENCOUNTER — TELEPHONE (OUTPATIENT)
Dept: ORTHOPEDIC SURGERY | Age: 62
End: 2022-11-14

## 2022-11-14 NOTE — TELEPHONE ENCOUNTER
Patient seen at [facility] ER and had disc/lt ulna fracture/requesting Jean Claude Bagley because she is patient of his.

## 2022-11-16 ENCOUNTER — OFFICE VISIT (OUTPATIENT)
Dept: ORTHOPEDIC SURGERY | Age: 62
End: 2022-11-16
Payer: MEDICARE

## 2022-11-16 DIAGNOSIS — S52.572A OTHER INTRAARTICULAR FRACTURE OF LOWER END OF LEFT RADIUS, INITIAL ENCOUNTER FOR CLOSED FRACTURE: ICD-10-CM

## 2022-11-16 DIAGNOSIS — S62.102A CLOSED FRACTURE OF LEFT WRIST, INITIAL ENCOUNTER: Primary | ICD-10-CM

## 2022-11-16 PROCEDURE — L3908 WHO COCK-UP NONMOLDE PRE OTS: HCPCS | Performed by: ORTHOPAEDIC SURGERY

## 2022-11-16 PROCEDURE — 1036F TOBACCO NON-USER: CPT | Performed by: ORTHOPAEDIC SURGERY

## 2022-11-16 PROCEDURE — 3017F COLORECTAL CA SCREEN DOC REV: CPT | Performed by: ORTHOPAEDIC SURGERY

## 2022-11-16 PROCEDURE — 99204 OFFICE O/P NEW MOD 45 MIN: CPT | Performed by: ORTHOPAEDIC SURGERY

## 2022-11-16 PROCEDURE — G8428 CUR MEDS NOT DOCUMENT: HCPCS | Performed by: ORTHOPAEDIC SURGERY

## 2022-11-16 PROCEDURE — G8484 FLU IMMUNIZE NO ADMIN: HCPCS | Performed by: ORTHOPAEDIC SURGERY

## 2022-11-16 PROCEDURE — G8417 CALC BMI ABV UP PARAM F/U: HCPCS | Performed by: ORTHOPAEDIC SURGERY

## 2022-11-16 NOTE — LETTER
DME Patient Authorization Form    Name: Stepan Hinson  : 1960  MRN: [id number]   Age: 57 y.o. Gender: female  Delivery Address: [facility]     Diagnosis:     ICD-10-CM    1. Closed fracture of left wrist, initial encounter  S62.102A XR WRIST LEFT (MIN 3 VIEWS)           Requested DME:  Wrist Lacer- ($65.00) X 1 - left        Clinical Notes:     **Indicates non-covered items by insurance. Payment expected on date of service. Electronically signed by  Provider: Skyler Diaz MD__Date: 2022                            [address] Tax ID # [id number]        Durable Medical Equipment and/or Orthotics Patient Consent     I understand that my physician has prescribed this medical supply as part of my treatment plan as a matter of Medical Necessity.  I understand that I have a choice in where I receive my prescribed orthopedic supplies and/or services.  I authorize [facility] to furnish this service/product and to provide my insurance carrier with any information requested in order to process for payment.  I instruct my insurance carrier to pay [facility] directly for these services/products.  I understand that my insurance carrier may deny payment for this supply because it is a non-covered item, deemed not medically necessary or considered experimental.   I understand that any cost not covered by my insurance carrier will be solely my financial responsibility.  I have received the Supplier Standards and have reviewed them.  I have received the prescribed item and have been fully instructed on the proper use of the above services/products.    ______ (Patient Initials) I understand that all DME items are non-returnable after being dispensed. Items still in sealed packaging may be returned up to 14 days after purchasing.  [facility] will replace items that are defective.    ______ (Patient Initials) I understand that Tawnya Doll will not file a claim with my insurance carrier for this service/product and I am waiving my right to file a claim on my own for this service/product with my insurance company as this item is NON-COVERED (Denoted by the **) by my Insurance company/policy. ______ (Patient Initials) I understand that I am responsible to bring my equipment to the hospital for any surgery. ______________________________________________  ________________________  Patient / René Salcedo            Thank you for considering [address] Your physician has prescribed specific medical equipment or devices for your home use. The following describes your rights and responsibilities as our customer. Right to Choose Providers: You have a choice regarding which company supplies your home medical equipment and devices, and to consult your physician in this decision. You may choose a medical supply store, a home medical equipment provider, or a specialist such as [facility]. [facility] will coordinate with your physician to provide the medical equipment or devices prescribed for your home use. Right to Service:  You have the right to considerate, respectful and nondiscriminatory care. You have the right to receive accurate and easily understood information about your health care. If you speak a foreign language, or don't understand the discussions, assistance will be provided to allow you to make informed health care decisions. You have the right to know your treatment options and to participate in decisions about your care, including the right to accept or refuse treatment. You have the right to expect a reasonable response to your requests for treatment or service.   You have the right to talk in confidence with health care providers and to have your health care information protected. You have the right to receive an explanation of your bill. You have the right to complain about the service or product you receive. Patient Responsibilities:  Please provide complete and accurate information about your health insurance benefits and make arrangements for the timely payment of your bill. [facility] will, if possible, assume responsibility for billing your insurance (Medicare, Medicaid and commercial) for the prescribed equipment or devices. If your policy does not cover the prescribed product, or only covers a portion of the bill, you are responsible for any remaining balance. Return and Exchange Policy:  [facility] will honor published  Warranties for products. [facility] will accept returns or exchanges within 14 days from the date of receipt, providin) the product must be in new condition; 2) receipt as required; and 3) used disposable and hygiene products may only be returned due to a defective product. Note: Refunds will be issued in a timely manner, please allow 4-6 weeks for processing. Complaint Procedures and DME Consumer Protection Resources:  [facility] values you as a customer, and is committed to resolving patient concerns. This commitment includes understanding and documenting your concerns, conducting a review of internal procedures, and providing you with an explanation and resolution to your concerns. Should you have any questions about our services or billing process, please contact our office at (practice phone number). If we are unable to resolve the concern, you have the right to direct comments to the office of Consumer Protection, in the [address] or the [facility] office, without fear of repercussion.     DMEPOS SUPPLIER STANDARDS    A supplier must be in compliance with all applicable Federal and State licensure and regulatory requirements. A supplier must provide complete and accurate information on the DMEPOS supplier application. Any changes to this information must be reported to the [facility] [employer] within 30 days. An authorized individual (one whose signature is binding) must sign the application for billing privileges. A supplier must fill orders from its own inventory, or must contract with other companies for the purchase of items necessary to fill the order. A supplier may not contract with any entity that is currently excluded from the Medicare program, any [facility] program, or from any other Federal procurement or Nonprocurement programs. A supplier must advise beneficiaries that they may rent or purchase inexpensive or routinely purchased durable medical equipment, and of the purchase option for capped rental equipment. A supplier must notify beneficiaries of warranty coverage and honor all warranties under applicable State Law, and repair or replace free of charge Medicare covered items that are under warranty. A supplier must maintain a physical facility on an appropriate site. A supplier must permit CMS, or its agents to conduct on-site inspections to ascertain the supplier's compliance with these standards. The supplier location must be accessible to beneficiaries during reasonable business hours, and must maintain a visible sign and posted hours of operation. A supplier must maintain a primary business telephone listed under the name of the business in a [employer] or a toll free number available through directory assistance. The exclusive use of a beeper, answering machine or cell phone is prohibited. A supplier must have comprehensive liability insurance in the amount of at least $300,000 that covers both the supplier's place of business and all customers and employees of the supplier.   If the supplier manufactures its own items, this insurance must also cover product liability and completed operations. A supplier must agree not to initiate telephone contact with beneficiaries, with a few exceptions allowed. This standard prohibits suppliers from calling beneficiaries in order to solicit new business. A supplier is responsible for delivery and must instruct beneficiaries on use of Medicare covered items, and maintain proof of delivery. A supplier must answer questions, and respond to complaints of the beneficiaries, and maintain documentation of such contacts. A supplier must maintain and replace at no charge or repair directly, or through a service contract with another company, Medicare covered items it has rented to beneficiaries. A supplier must accept returns of substandard (less than full quality for the particular item) or unsuitable items (inappropriate for the beneficiary at the time it was fitted and rented or sold) from beneficiaries. A supplier must disclose these supplier standards to each beneficiary to whom it supplies a Medicare-covered item. A supplier must disclose to the government any person having ownership, financial, or control interest in the supplier. A supplier must not convey or reassign a supplier number; i.e., the supplier may not sell or allow another entity to use its Medicare billing number. A supplier must have a complaint resolution protocol established to address beneficiary complaints that relate to these standards. A record of these complaints must be maintained at the physical facility. Complaint records must include: the name, address, telephone number and health insurance claim number of the beneficiary, a summary of the complaint, and any action taken to resolve it. A supplier must agree to furnish CMS any information required by the Medicare statute and implementing regulations.   A supplier of DMEPOS and other items and services must be accredited by a CMS-approved accreditation organization in order to receive and retain a supplier billing number. The accreditation must indicate the specific products and services, for which the supplier is accredited in order for the supplier to receive payment for those specific products and services. A DMEPOS supplier must notify their accreditation organization when a new DMEPOS location is opened. The accreditation organization may accredit the new supplier location for three months after it is operational without requiring a new site visit. All DMEPOS supplier locations, whether owned or subcontracted, must meet the [employer] and be separately accredited in order to bill Medicare. An accredited supplier may be denied enrollment or their enrollment may be revoked, if CMS determines that they are not in compliance with the DMEPOS quality standards. A DMEPOS supplier must disclose upon enrollment all products and services, including the addition of new product lines for which they are seeking accreditation. If a new product line is added after enrollment, the DMEPOS supplier will be responsible for notifying the accrediting body of the new product so that the DMEPOS supplier can be re-surveyed and accredited for these new products. Must meet the surety bond requirements specified in 42 C. F.R. 424.57(c). Implementation date- May 4, 2009. A supplier must obtain oxygen from a state-licensed oxygen supplier. A supplier must maintain ordering and referring documentation consistent with provisions found in 42 C. F.R. 424.516(f). DMEPOS suppliers are prohibited from sharing a practice location with certain other Medicare providers and suppliers. DMEPOS suppliers must remain open to the public for a minimum of 30 hours per week with certain exceptions.

## 2022-11-16 NOTE — PROGRESS NOTES
Orthopaedic Hand Clinic Note    Name: Laya Lama  Age: 57 y.o. YOB: 1960  Gender: female  MRN: [id number]      CC: Patient referred for evaluation of hand pain    HPI: Laya Lama is a 57 y.o. female right handed with a chief complaint of  left wrist pain. The injury occurred 2 days ago when the patient slipped on tomato juice in her kitchen. The pain is located diffusely about the wrist. Denies numbness or paresthesias of the fingers. Evaluation has included x-rays. Treatment to date has included splint. Denies loss of consciousness, head injury or neck pain. ROS/Meds/PSH/PMH/FH/SH: I personally reviewed the patients standard intake form. Pertinents are discussed in the HPI    Physical Examination:  General: Awake and alert. HEENT: Normocephalic, atraumatic  CV/Pulm: Breathing even and unlabored  Skin: No obvious rashes noted. Lymphatic: No obvious evidence of lymphedema or lymphadenopathy    Musculoskeletal Exam:  Examination of the left upper extremity demonstrates no open wounds. Negative Tinel's over left carpal tunnel. Sensation is intact throughout, cap refill in all fingers < 5 seconds. Finger motion is limited with moderate swelling of the hand and fingers. Tenderness over the distal radius. Positive tenderness over the ulnar aspect of the wrist. No tenderness at elbow, shoulder, clavicle or cervical spine. Imaging / Electrodiagnostic Tests:     XR of the left wrist from outside source was reviewed and independently interpreted, this demonstrates a distal radius fracture with intra-articular fracture of the left distal radius with neutral tilt    left Wrist XR: AP, Lateral, Oblique views     Clinical Indication:  1. Closed fracture of left wrist, initial encounter    2.  Other intraarticular fracture of lower end of left radius, initial encounter for closed fracture           Report: AP, lateral, oblique x-ray wrist XRs demonstrates distal radius fracture, intra-articular extension as previously demonstrated, unchanged neutral tilt    Impression: as above     Bandar Mcfarlane MD       Assessment:   1. Closed fracture of left wrist, initial encounter    2. Other intraarticular fracture of lower end of left radius, initial encounter for closed fracture        Plan:   We discussed the diagnosis and different treatment options. We discussed observation, casting, further imaging, and surgical fixation and the risks, benefits and alternatives of all these options. After discussing in detail the patient elects to proceed with wrist brace, reassessment in 1 week, we discussed all treatment options including conservative measures and surgical intervention, she does have some dorsal comminution but her fracture is minimally displaced, the articular segment appears to be well reduced, repeat x-rays in 1 week we will determine if this remains nondisplaced and she would likely do well without surgery. Patient voiced accordance and understanding of the information provided and the formulated plan. All questions were answered to the patient's satisfaction during the encounter.     Bandar Mcfarlane MD  Orthopaedic Surgery  11/16/22  4:19 PM

## 2022-11-21 NOTE — PROGRESS NOTES
Patient was fit for a Wrist/Forearm lacer for patients left elbow pain. Patient is instructed the brace should fit nicely with in the palm and right below the knuckles on the dorsal side of the hand. The patient was aware the brace should fit snuggly around the wrist/forearm area. The strap placed through the thumb and first finger should fit comfortably to insure the brace does not slide or shift. Patient read and signed documenting they understand and agree to [facility]'s current DME return policy.

## 2022-11-23 ENCOUNTER — OFFICE VISIT (OUTPATIENT)
Dept: ORTHOPEDIC SURGERY | Age: 62
End: 2022-11-23
Payer: MEDICARE

## 2022-11-23 DIAGNOSIS — S62.102A CLOSED FRACTURE OF LEFT WRIST, INITIAL ENCOUNTER: Primary | ICD-10-CM

## 2022-11-23 PROCEDURE — 99213 OFFICE O/P EST LOW 20 MIN: CPT | Performed by: ORTHOPAEDIC SURGERY

## 2022-11-23 PROCEDURE — G8417 CALC BMI ABV UP PARAM F/U: HCPCS | Performed by: ORTHOPAEDIC SURGERY

## 2022-11-23 PROCEDURE — 1036F TOBACCO NON-USER: CPT | Performed by: ORTHOPAEDIC SURGERY

## 2022-11-23 PROCEDURE — G8484 FLU IMMUNIZE NO ADMIN: HCPCS | Performed by: ORTHOPAEDIC SURGERY

## 2022-11-23 PROCEDURE — G8428 CUR MEDS NOT DOCUMENT: HCPCS | Performed by: ORTHOPAEDIC SURGERY

## 2022-11-23 PROCEDURE — 3017F COLORECTAL CA SCREEN DOC REV: CPT | Performed by: ORTHOPAEDIC SURGERY

## 2022-11-23 NOTE — PROGRESS NOTES
Orthopaedic Hand Clinic Note    Name: Remberto Ibrahim  Age: 57 y.o. YOB: 1960  Gender: female  MRN: [id number]      Follow up visit:   1. Closed fracture of left wrist, initial encounter        HPI: Remberto Ibrahim is a 57 y.o. female who is following up for left distal radius fracture, she reports improvement in pain and motion but she still has significant pain with any wrist flexion or extension. ROS/Meds/PSH/PMH/FH/SH: I personally reviewed the patients standard intake form. Pertinents are discussed in the HPI    Physical Examination:  General: Awake and alert. HEENT: Normocephalic, atraumatic  CV/Pulm: Breathing even and unlabored  Skin: No obvious rashes noted. Lymphatic: No obvious evidence of lymphedema or lymphadenopathy    Musculoskeletal Examination:  Examination on the left upper extremity demonstrates cap refill < 5 seconds in all fingers, limited wrist motion due to pain, she is able make full composite fist although with some discomfort. Imaging / Electrodiagnostic Tests:     left Wrist XR: AP, Lateral, Oblique views     Clinical Indication:  1. Closed fracture of left wrist, initial encounter           Report: AP, lateral, oblique x-ray wrist XRs demonstrates distal radius fracture in unchanged alignment from previous x-ray    Impression: as above     Joan Barajas MD         Assessment:   1. Closed fracture of left wrist, initial encounter        Plan:   We discussed the diagnosis and different treatment options. We discussed observation, therapy, antiinflammatory medications and other pertinent treatment modalities. After discussing in detail the patient elects to proceed with continue wrist brace, I will reassess in 2 weeks with repeat x-rays, over-the-counter anti-inflammatories as needed for pain. Patient voiced accordance and understanding of the information provided and the formulated plan.  All questions were answered to the patient's satisfaction during the encounter.     Joan Barajas MD  Orthopaedic Surgery  11/23/22  3:44 PM

## 2022-12-09 ENCOUNTER — OFFICE VISIT (OUTPATIENT)
Dept: ORTHOPEDIC SURGERY | Age: 62
End: 2022-12-09

## 2022-12-09 DIAGNOSIS — S62.102A CLOSED FRACTURE OF LEFT WRIST, INITIAL ENCOUNTER: Primary | ICD-10-CM

## 2022-12-09 NOTE — PROGRESS NOTES
Orthopaedic Hand Clinic Note    Name: Natalia Fountain  Age: 60 y.o. YOB: 1960  Gender: female  MRN: [id number]      Follow up visit:   1. Closed fracture of left wrist, initial encounter        HPI: Natalia Fountain is a 60 y.o. female who is following up for left left distal radius fracture 4 weeks ago, patient reports slight improvement in pain. ROS/Meds/PSH/PMH/FH/SH: I personally reviewed the patients standard intake form. Pertinents are discussed in the HPI    Physical Examination:  General: Awake and alert. HEENT: Normocephalic, atraumatic  CV/Pulm: Breathing even and unlabored  Skin: No obvious rashes noted. Lymphatic: No obvious evidence of lymphedema or lymphadenopathy    Musculoskeletal Examination:  Examination on the left upper extremity demonstrates cap refill < 5 seconds in all fingers, full finger motion, tenderness ovation of the distal radius, slightly diminished wrist motion due to pain. Imaging / Electrodiagnostic Tests:     left Wrist XR: AP, Lateral, Oblique views     Clinical Indication:  1. Closed fracture of left wrist, initial encounter           Report: AP, lateral, oblique x-ray wrist XRs demonstrates well-maintained alignment of left distal radius fracture unchanged from previous x-rays    Impression: as above     Evelyn Lazo MD         Assessment:   1. Closed fracture of left wrist, initial encounter        Plan:   We discussed the diagnosis and different treatment options. We discussed observation, therapy, antiinflammatory medications and other pertinent treatment modalities. After discussing in detail the patient elects to proceed with active motion as tolerated, and 2 weeks he may start passive motion as tolerated and strengthening, she will come back in 6 weeks if she has any issues, over-the-counter anti-inflammatories as needed for pain, I offered formal hand therapy referral but the patient politely declined.      Patient voiced accordance and understanding of the information provided and the formulated plan. All questions were answered to the patient's satisfaction during the encounter.     Samreen Jacobs MD  Orthopaedic Surgery  12/09/22  8:20 AM

## 2022-12-12 ENCOUNTER — OFFICE VISIT (OUTPATIENT)
Dept: FAMILY MEDICINE CLINIC | Age: 62
End: 2022-12-12

## 2022-12-12 VITALS
HEIGHT: 61 IN | RESPIRATION RATE: 20 BRPM | BODY MASS INDEX: 35.12 KG/M2 | SYSTOLIC BLOOD PRESSURE: 118 MMHG | WEIGHT: 186 LBS | HEART RATE: 80 BPM | DIASTOLIC BLOOD PRESSURE: 72 MMHG | TEMPERATURE: 97.8 F

## 2022-12-12 DIAGNOSIS — B96.89 ACUTE BACTERIAL SINUSITIS: Primary | ICD-10-CM

## 2022-12-12 DIAGNOSIS — B37.9 CANDIDIASIS: ICD-10-CM

## 2022-12-12 DIAGNOSIS — J01.90 ACUTE BACTERIAL SINUSITIS: Primary | ICD-10-CM

## 2022-12-12 PROCEDURE — G8484 FLU IMMUNIZE NO ADMIN: HCPCS | Performed by: NURSE PRACTITIONER

## 2022-12-12 PROCEDURE — 99213 OFFICE O/P EST LOW 20 MIN: CPT | Performed by: NURSE PRACTITIONER

## 2022-12-12 PROCEDURE — 3017F COLORECTAL CA SCREEN DOC REV: CPT | Performed by: NURSE PRACTITIONER

## 2022-12-12 PROCEDURE — 1036F TOBACCO NON-USER: CPT | Performed by: NURSE PRACTITIONER

## 2022-12-12 PROCEDURE — G8417 CALC BMI ABV UP PARAM F/U: HCPCS | Performed by: NURSE PRACTITIONER

## 2022-12-12 PROCEDURE — G8427 DOCREV CUR MEDS BY ELIG CLIN: HCPCS | Performed by: NURSE PRACTITIONER

## 2022-12-12 RX ORDER — AMOXICILLIN AND CLAVULANATE POTASSIUM 875; 125 MG/1; MG/1
1 TABLET, FILM COATED ORAL 2 TIMES DAILY
Qty: 20 TABLET | Refills: 0 | Status: SHIPPED | OUTPATIENT
Start: 2022-12-12 | End: 2022-12-22

## 2022-12-12 RX ORDER — FLUCONAZOLE 150 MG/1
TABLET ORAL
Qty: 2 TABLET | Refills: 0 | Status: SHIPPED | OUTPATIENT
Start: 2022-12-12

## 2022-12-12 ASSESSMENT — PATIENT HEALTH QUESTIONNAIRE - PHQ9
6. FEELING BAD ABOUT YOURSELF - OR THAT YOU ARE A FAILURE OR HAVE LET YOURSELF OR YOUR FAMILY DOWN: 0
9. THOUGHTS THAT YOU WOULD BE BETTER OFF DEAD, OR OF HURTING YOURSELF: 0
3. TROUBLE FALLING OR STAYING ASLEEP: 0
SUM OF ALL RESPONSES TO PHQ QUESTIONS 1-9: 0
1. LITTLE INTEREST OR PLEASURE IN DOING THINGS: 0
2. FEELING DOWN, DEPRESSED OR HOPELESS: 0
SUM OF ALL RESPONSES TO PHQ QUESTIONS 1-9: 0
SUM OF ALL RESPONSES TO PHQ9 QUESTIONS 1 & 2: 0
5. POOR APPETITE OR OVEREATING: 0
SUM OF ALL RESPONSES TO PHQ QUESTIONS 1-9: 0
8. MOVING OR SPEAKING SO SLOWLY THAT OTHER PEOPLE COULD HAVE NOTICED. OR THE OPPOSITE, BEING SO FIGETY OR RESTLESS THAT YOU HAVE BEEN MOVING AROUND A LOT MORE THAN USUAL: 0
7. TROUBLE CONCENTRATING ON THINGS, SUCH AS READING THE NEWSPAPER OR WATCHING TELEVISION: 0
SUM OF ALL RESPONSES TO PHQ QUESTIONS 1-9: 0
10. IF YOU CHECKED OFF ANY PROBLEMS, HOW DIFFICULT HAVE THESE PROBLEMS MADE IT FOR YOU TO DO YOUR WORK, TAKE CARE OF THINGS AT HOME, OR GET ALONG WITH OTHER PEOPLE: 0
4. FEELING TIRED OR HAVING LITTLE ENERGY: 0

## 2022-12-12 ASSESSMENT — ENCOUNTER SYMPTOMS
GASTROINTESTINAL NEGATIVE: 1
SINUS PAIN: 1
ALLERGIC/IMMUNOLOGIC NEGATIVE: 1
RESPIRATORY NEGATIVE: 1
EYES NEGATIVE: 1
SINUS PRESSURE: 1

## 2022-12-12 NOTE — PROGRESS NOTES
[address]  [address] [facility]  [place], [address] [facility]   (ph) [phone] (fax) [phone]  Gloria DEL CID, FNP-C      Chief Complaint   Patient presents with    Sinusitis         61 yo female comes in c/o a sinus and candida infection. Reports sinus issues started two weeks ago and are worsening. Reports ATB always causes yeast infections.        Allergies   Allergen Reactions    Oxycodone-Acetaminophen Rash       Past Medical History:   Diagnosis Date    Arthritis     back, shoulders, hand    Asthma     allergy triggered    Depression     anxiety    Diabetes ([address]     \"borderline\" fbs 109-125    GERD (gastroesophageal reflux disease)     medications prn    History of degenerative disc disease        Family History   Problem Relation Age of Onset    Breast Cancer Maternal Grandmother 76    Breast Cancer Maternal Aunt 79    Breast Cancer Other     Diabetes Mother     Pulmonary Embolism Sister     Stroke Mother     Breast Cancer Mother 72    Diabetes Father     Stroke Father        Social History     Socioeconomic History    Marital status:      Spouse name: Not on file    Number of children: Not on file    Years of education: Not on file    Highest education level: Not on file   Occupational History    Not on file   Tobacco Use    Smoking status: Never    Smokeless tobacco: Never    Tobacco comments:     Quit smokin years ago   Substance and Sexual Activity    Alcohol use: No    Drug use: No    Sexual activity: Not on file   Other Topics Concern    Not on file   Social History Narrative    Not on file     Social Determinants of Health     Financial Resource Strain: Medium Risk    Difficulty of Paying Living Expenses: Somewhat hard   Food Insecurity: Food Insecurity Present    Worried About Running Out of Food in the Last Year: Sometimes true    Ran Out of Food in the Last Year: Sometimes true   Transportation Needs: No Transportation Needs    Lack of Transportation (Medical): No    Lack of Transportation (Non-Medical): No   Physical Activity: Not on file   Stress: Not on file   Social Connections: Not on file   Intimate Partner Violence: Not on file   Housing Stability: Not on file       OB History    No obstetric history on file. Past Surgical History:   Procedure Laterality Date    CARPAL TUNNEL RELEASE  04/26/2021    Right hand    CHOLECYSTECTOMY      COLONOSCOPY      GASTRIC BYPASS SURGERY  2012    HERNIA REPAIR  2016 August Thursday 19 2016;     ORTHOPEDIC SURGERY Right     arm    ORTHOPEDIC SURGERY Right     shoulder surgery x 3    ORTHOPEDIC SURGERY Left     shoulder surgery     OTHER SURGICAL HISTORY      patient reports spinal ablation        Health Maintenance   Topic Date Due    HIV screen  Never done    Diabetic retinal exam  Never done    Cervical cancer screen  Never done    Shingles vaccine (1 of 2) Never done    COVID-19 Vaccine (4 - Booster for Pfizer series) 12/14/2021    Flu vaccine (1) 08/01/2022    Diabetic foot exam  03/29/2023    Diabetic microalbuminuria test  03/29/2023    Annual Wellness Visit (AWV)  03/30/2023    A1C test (Diabetic or Prediabetic)  09/06/2023    Lipids  09/06/2023    Depression Monitoring  09/06/2023    Breast cancer screen  09/29/2023    Colorectal Cancer Screen  02/25/2024    DTaP/Tdap/Td vaccine (2 - Td or Tdap) 01/21/2030    Pneumococcal 0-64 years Vaccine  Completed    Hepatitis C screen  Completed    Hepatitis A vaccine  Aged Out    Hib vaccine  Aged Out    Meningococcal (ACWY) vaccine  Aged Out         Current Outpatient Medications:     amoxicillin-clavulanate (AUGMENTIN) 875-125 MG per tablet, Take 1 tablet by mouth 2 times daily for 10 days, Disp: 20 tablet, Rfl: 0    fluconazole (DIFLUCAN) 150 MG tablet, Take one now and one after completion of med, Disp: 2 tablet, Rfl: 0    clotrimazole-betamethasone (LOTRISONE) 1-0.05 % cream, Apply topically 2 times daily. , Disp: 30 g, Rfl: 0    glipiZIDE (GLUCOTROL XL) 2.5 MG extended release tablet, Take 1 tablet by mouth daily, Disp: 90 tablet, Rfl: 1    montelukast (SINGULAIR) 10 MG tablet, Take 1 tablet by mouth daily, Disp: 90 tablet, Rfl: 1    albuterol (PROVENTIL) (2.5 MG/3ML) 0.083% nebulizer solution, Take 3 mLs by nebulization every 6 hours as needed for Wheezing or Shortness of Breath, Disp: 120 each, Rfl: 1    citalopram (CELEXA) 20 MG tablet, Take 1  1/2 po daily, Disp: 135 tablet, Rfl: 1    omeprazole (PRILOSEC) 20 MG delayed release capsule, Take 1 capsule by mouth daily, Disp: 90 capsule, Rfl: 1    ondansetron (ZOFRAN) 4 MG tablet, Take 1 tablet by mouth every 8 hours as needed for Nausea, Disp: 15 tablet, Rfl: 1    topiramate (TOPAMAX) 25 MG tablet, Take 1 tablet by mouth daily 1 tab po daily, Disp: 90 tablet, Rfl: 0    albuterol sulfate  (90 Base) MCG/ACT inhaler, Inhale 2 puffs into the lungs every 6 hours as needed, Disp: , Rfl:     budesonide-formoterol (SYMBICORT) 80-4.5 MCG/ACT AERO, Inhale 2 puffs into the lungs 2 times daily, Disp: , Rfl:     vitamin D (CHOLECALCIFEROL) 25 MCG (1000 UT) TABS tablet, Take 1,000 Units by mouth daily, Disp: , Rfl:     diclofenac sodium (VOLTAREN) 1 % GEL, Apply topically, Disp: , Rfl:     fluticasone (FLONASE) 50 MCG/ACT nasal spray, 2 sprays by Nasal route daily, Disp: , Rfl:     gabapentin (NEURONTIN) 300 MG capsule, Take 300 mg by mouth. (Patient not taking: Reported on 9/6/2022), Disp: , Rfl:     HYDROcodone-acetaminophen (NORCO)  MG per tablet, Take 1 tablet by mouth every 6 hours as needed. , Disp: , Rfl:     naloxone 4 MG/0.1ML LIQD nasal spray, 4 mg by Nasal route as needed, Disp: , Rfl:     thiamine 100 MG tablet, Take 100 mg by mouth daily, Disp: , Rfl:     Review of Systems   Constitutional: Negative. HENT:  Positive for congestion, sinus pressure and sinus pain. Eyes: Negative. Respiratory: Negative. Cardiovascular: Negative. Gastrointestinal: Negative.     Endocrine: Negative. Genitourinary:         Reports candida infection   Musculoskeletal: Negative. Skin: Negative. Allergic/Immunologic: Negative. Neurological: Negative. Hematological: Negative. Psychiatric/Behavioral: Negative. Vitals:    12/12/22 1104   BP: 118/72   Pulse: 80   Resp: 20   Temp: 97.8 °F (36.6 °C)        Physical Exam  Constitutional:       Appearance: Normal appearance. HENT:      Head: Normocephalic. Nose: Congestion present. Comments: Facial tenderness mainly over maxillary sinuses  Eyes:      Extraocular Movements: Extraocular movements intact. Pupils: Pupils are equal, round, and reactive to light. Cardiovascular:      Rate and Rhythm: Normal rate and regular rhythm. Pulmonary:      Effort: Pulmonary effort is normal.      Breath sounds: Normal breath sounds. Abdominal:      General: Abdomen is flat. Palpations: Abdomen is soft. Musculoskeletal:         General: Normal range of motion. Cervical back: Normal range of motion and neck supple. Skin:     General: Skin is warm and dry. Neurological:      General: No focal deficit present. Mental Status: She is alert and oriented to person, place, and time. Psychiatric:         Mood and Affect: Mood normal.         Behavior: Behavior normal.              Assessment & Plan:    1. Acute bacterial sinusitis  - amoxicillin-clavulanate (AUGMENTIN) 875-125 MG per tablet; Take 1 tablet by mouth 2 times daily for 10 days  Dispense: 20 tablet; Refill: 0    2. Candidiasis  Pt was not prepared to do vaginal exam today  - fluconazole (DIFLUCAN) 150 MG tablet; Take one now and one after completion of ATB Dispense: 2 tablet; Refill: 0    Call for worsening symptom. This note was dictated using dragon voice recognition software. It has been proofread, but there may still exist voice recognition errors that the author did not detect.       Signed By: Becki Boudreaux, MARIA EUGENIA - CNP     December 12, 2022

## 2023-01-17 ENCOUNTER — APPOINTMENT (RX ONLY)
Dept: URBAN - METROPOLITAN AREA CLINIC 329 | Facility: CLINIC | Age: 63
Setting detail: DERMATOLOGY
End: 2023-01-17

## 2023-01-17 DIAGNOSIS — L85.3 XEROSIS CUTIS: ICD-10-CM

## 2023-01-17 DIAGNOSIS — L30.9 DERMATITIS, UNSPECIFIED: ICD-10-CM

## 2023-01-17 PROCEDURE — 99203 OFFICE O/P NEW LOW 30 MIN: CPT

## 2023-01-17 PROCEDURE — ? TREATMENT REGIMEN

## 2023-01-17 PROCEDURE — ? OTHER

## 2023-01-17 PROCEDURE — ? COUNSELING

## 2023-01-17 PROCEDURE — ? RECOMMENDATIONS

## 2023-01-17 ASSESSMENT — LOCATION DETAILED DESCRIPTION DERM
LOCATION DETAILED: PERIUMBILICAL SKIN
LOCATION DETAILED: LEFT ELBOW
LOCATION DETAILED: RIGHT ELBOW

## 2023-01-17 ASSESSMENT — LOCATION ZONE DERM
LOCATION ZONE: ARM
LOCATION ZONE: TRUNK

## 2023-01-17 ASSESSMENT — LOCATION SIMPLE DESCRIPTION DERM
LOCATION SIMPLE: RIGHT ELBOW
LOCATION SIMPLE: ABDOMEN
LOCATION SIMPLE: LEFT ELBOW

## 2023-01-17 NOTE — PROCEDURE: TREATMENT REGIMEN
Detail Level: Zone
Samples Given: CeraVe lotion
Otc Regimen: CeraVe bid
Otc Regimen: Hydrocortisone cream 10 if flares again

## 2023-01-17 NOTE — PROCEDURE: RECOMMENDATIONS
Detail Level: Zone
Render Risk Assessment In Note?: no
Recommendations (Free Text): Pt state that she is clear at todays visit.

## 2023-01-17 NOTE — PROCEDURE: OTHER
Other (Free Text): Stressed importance of moisturizing twice a day
Render Risk Assessment In Note?: no
Note Text (......Xxx Chief Complaint.): This diagnosis correlates with the
Detail Level: Zone

## 2023-01-18 ENCOUNTER — OFFICE VISIT (OUTPATIENT)
Dept: FAMILY MEDICINE CLINIC | Facility: CLINIC | Age: 63
End: 2023-01-18
Payer: MEDICARE

## 2023-01-18 VITALS
WEIGHT: 187.2 LBS | TEMPERATURE: 98.6 F | HEART RATE: 68 BPM | DIASTOLIC BLOOD PRESSURE: 70 MMHG | SYSTOLIC BLOOD PRESSURE: 138 MMHG | BODY MASS INDEX: 35.34 KG/M2 | HEIGHT: 61 IN | OXYGEN SATURATION: 100 %

## 2023-01-18 DIAGNOSIS — E66.09 CLASS 2 OBESITY DUE TO EXCESS CALORIES WITHOUT SERIOUS COMORBIDITY WITH BODY MASS INDEX (BMI) OF 35.0 TO 35.9 IN ADULT: ICD-10-CM

## 2023-01-18 DIAGNOSIS — E78.2 MIXED HYPERLIPIDEMIA: ICD-10-CM

## 2023-01-18 DIAGNOSIS — K21.9 GASTROESOPHAGEAL REFLUX DISEASE WITHOUT ESOPHAGITIS: ICD-10-CM

## 2023-01-18 DIAGNOSIS — F33.0 MAJOR DEPRESSIVE DISORDER, RECURRENT, MILD (HCC): ICD-10-CM

## 2023-01-18 DIAGNOSIS — J45.909 MILD REACTIVE AIRWAYS DISEASE, UNSPECIFIED WHETHER PERSISTENT: ICD-10-CM

## 2023-01-18 DIAGNOSIS — R51.9 NONINTRACTABLE HEADACHE, UNSPECIFIED CHRONICITY PATTERN, UNSPECIFIED HEADACHE TYPE: ICD-10-CM

## 2023-01-18 DIAGNOSIS — E11.9 TYPE 2 DIABETES MELLITUS WITHOUT COMPLICATION, WITHOUT LONG-TERM CURRENT USE OF INSULIN (HCC): Primary | ICD-10-CM

## 2023-01-18 LAB
ALBUMIN SERPL-MCNC: 4.1 G/DL (ref 3.2–4.6)
ALBUMIN/GLOB SERPL: 1.1 (ref 0.4–1.6)
ALP SERPL-CCNC: 132 U/L (ref 50–136)
ALT SERPL-CCNC: 29 U/L (ref 12–65)
ANION GAP SERPL CALC-SCNC: 7 MMOL/L (ref 2–11)
AST SERPL-CCNC: 24 U/L (ref 15–37)
BASOPHILS # BLD: 0 K/UL (ref 0–0.2)
BASOPHILS NFR BLD: 1 % (ref 0–2)
BILIRUB SERPL-MCNC: 0.7 MG/DL (ref 0.2–1.1)
BUN SERPL-MCNC: 11 MG/DL (ref 8–23)
CALCIUM SERPL-MCNC: 9.2 MG/DL (ref 8.3–10.4)
CHLORIDE SERPL-SCNC: 104 MMOL/L (ref 101–110)
CHOLEST SERPL-MCNC: 184 MG/DL
CK SERPL-CCNC: 97 U/L (ref 21–215)
CO2 SERPL-SCNC: 28 MMOL/L (ref 21–32)
CREAT SERPL-MCNC: 0.7 MG/DL (ref 0.6–1)
DIFFERENTIAL METHOD BLD: ABNORMAL
EOSINOPHIL # BLD: 0.2 K/UL (ref 0–0.8)
EOSINOPHIL NFR BLD: 3 % (ref 0.5–7.8)
ERYTHROCYTE [DISTWIDTH] IN BLOOD BY AUTOMATED COUNT: 14 % (ref 11.9–14.6)
GLOBULIN SER CALC-MCNC: 3.7 G/DL (ref 2.8–4.5)
GLUCOSE SERPL-MCNC: 146 MG/DL (ref 65–100)
HBA1C MFR BLD: 7.4 %
HCT VFR BLD AUTO: 41.4 % (ref 35.8–46.3)
HDLC SERPL-MCNC: 56 MG/DL (ref 40–60)
HDLC SERPL: 3.3
HGB BLD-MCNC: 12.9 G/DL (ref 11.7–15.4)
IMM GRANULOCYTES # BLD AUTO: 0 K/UL (ref 0–0.5)
IMM GRANULOCYTES NFR BLD AUTO: 0 % (ref 0–5)
LDLC SERPL CALC-MCNC: 107.6 MG/DL
LYMPHOCYTES # BLD: 1.5 K/UL (ref 0.5–4.6)
LYMPHOCYTES NFR BLD: 26 % (ref 13–44)
MAGNESIUM SERPL-MCNC: 2.4 MG/DL (ref 1.8–2.4)
MCH RBC QN AUTO: 28.1 PG (ref 26.1–32.9)
MCHC RBC AUTO-ENTMCNC: 31.2 G/DL (ref 31.4–35)
MCV RBC AUTO: 90.2 FL (ref 82–102)
MONOCYTES # BLD: 0.4 K/UL (ref 0.1–1.3)
MONOCYTES NFR BLD: 6 % (ref 4–12)
NEUTS SEG # BLD: 3.8 K/UL (ref 1.7–8.2)
NEUTS SEG NFR BLD: 64 % (ref 43–78)
NRBC # BLD: 0 K/UL (ref 0–0.2)
PLATELET # BLD AUTO: 376 K/UL (ref 150–450)
PMV BLD AUTO: 10.2 FL (ref 9.4–12.3)
POTASSIUM SERPL-SCNC: 4.3 MMOL/L (ref 3.5–5.1)
PROT SERPL-MCNC: 7.8 G/DL (ref 6.3–8.2)
RBC # BLD AUTO: 4.59 M/UL (ref 4.05–5.2)
SODIUM SERPL-SCNC: 139 MMOL/L (ref 133–143)
TRIGL SERPL-MCNC: 102 MG/DL (ref 35–150)
VLDLC SERPL CALC-MCNC: 20.4 MG/DL (ref 6–23)
WBC # BLD AUTO: 6 K/UL (ref 4.3–11.1)

## 2023-01-18 PROCEDURE — G8484 FLU IMMUNIZE NO ADMIN: HCPCS | Performed by: NURSE PRACTITIONER

## 2023-01-18 PROCEDURE — G8427 DOCREV CUR MEDS BY ELIG CLIN: HCPCS | Performed by: NURSE PRACTITIONER

## 2023-01-18 PROCEDURE — 99214 OFFICE O/P EST MOD 30 MIN: CPT | Performed by: NURSE PRACTITIONER

## 2023-01-18 PROCEDURE — G8417 CALC BMI ABV UP PARAM F/U: HCPCS | Performed by: NURSE PRACTITIONER

## 2023-01-18 PROCEDURE — 3046F HEMOGLOBIN A1C LEVEL >9.0%: CPT | Performed by: NURSE PRACTITIONER

## 2023-01-18 PROCEDURE — 3017F COLORECTAL CA SCREEN DOC REV: CPT | Performed by: NURSE PRACTITIONER

## 2023-01-18 PROCEDURE — 83036 HEMOGLOBIN GLYCOSYLATED A1C: CPT | Performed by: NURSE PRACTITIONER

## 2023-01-18 PROCEDURE — 2022F DILAT RTA XM EVC RTNOPTHY: CPT | Performed by: NURSE PRACTITIONER

## 2023-01-18 PROCEDURE — 1036F TOBACCO NON-USER: CPT | Performed by: NURSE PRACTITIONER

## 2023-01-18 RX ORDER — TOPIRAMATE 25 MG/1
25 TABLET ORAL DAILY
Qty: 90 TABLET | Refills: 0 | Status: SHIPPED | OUTPATIENT
Start: 2023-01-18

## 2023-01-18 RX ORDER — ONDANSETRON 4 MG/1
4 TABLET, FILM COATED ORAL EVERY 8 HOURS PRN
Qty: 10 TABLET | Refills: 1 | Status: SHIPPED | OUTPATIENT
Start: 2023-01-18

## 2023-01-18 RX ORDER — GLIPIZIDE 2.5 MG/1
2.5 TABLET, EXTENDED RELEASE ORAL DAILY
Qty: 90 TABLET | Refills: 1 | Status: SHIPPED | OUTPATIENT
Start: 2023-01-18

## 2023-01-18 RX ORDER — FLUTICASONE PROPIONATE 50 MCG
2 SPRAY, SUSPENSION (ML) NASAL DAILY
Qty: 16 G | Refills: 2 | Status: SHIPPED | OUTPATIENT
Start: 2023-01-18

## 2023-01-18 RX ORDER — OMEPRAZOLE 20 MG/1
20 CAPSULE, DELAYED RELEASE ORAL DAILY
Qty: 90 CAPSULE | Refills: 1 | Status: SHIPPED | OUTPATIENT
Start: 2023-01-18

## 2023-01-18 RX ORDER — MONTELUKAST SODIUM 10 MG/1
10 TABLET ORAL DAILY
Qty: 90 TABLET | Refills: 1 | Status: SHIPPED | OUTPATIENT
Start: 2023-01-18

## 2023-01-18 RX ORDER — ALBUTEROL SULFATE 90 UG/1
2 AEROSOL, METERED RESPIRATORY (INHALATION) EVERY 6 HOURS PRN
Qty: 18 G | Refills: 1 | Status: SHIPPED | OUTPATIENT
Start: 2023-01-18

## 2023-01-18 RX ORDER — CITALOPRAM 20 MG/1
TABLET ORAL
Qty: 135 TABLET | Refills: 1 | Status: SHIPPED | OUTPATIENT
Start: 2023-01-18

## 2023-01-18 RX ORDER — CLOTRIMAZOLE AND BETAMETHASONE DIPROPIONATE 10; .64 MG/G; MG/G
CREAM TOPICAL
Qty: 30 G | Refills: 0 | Status: SHIPPED | OUTPATIENT
Start: 2023-01-18

## 2023-01-18 ASSESSMENT — ENCOUNTER SYMPTOMS
RESPIRATORY NEGATIVE: 1
GASTROINTESTINAL NEGATIVE: 1
EYES NEGATIVE: 1
ALLERGIC/IMMUNOLOGIC NEGATIVE: 1

## 2023-01-18 ASSESSMENT — PATIENT HEALTH QUESTIONNAIRE - PHQ9
SUM OF ALL RESPONSES TO PHQ QUESTIONS 1-9: 2
1. LITTLE INTEREST OR PLEASURE IN DOING THINGS: 1
SUM OF ALL RESPONSES TO PHQ QUESTIONS 1-9: 2
2. FEELING DOWN, DEPRESSED OR HOPELESS: 1
SUM OF ALL RESPONSES TO PHQ9 QUESTIONS 1 & 2: 2

## 2023-01-18 NOTE — PROGRESS NOTES
[address]  [address] [facility] [place], [address] [facility]   (ph) [phone] (fax) [phone]  YUKI Allen-C      Chief Complaint   Patient presents with    3 Month Follow-Up    Diabetes    Medication Refill       61 yo female comes into the office to follow up on chronic conditions and to get med refills. Feels Citalopram  is working ok. She lost her mother last year and the holidays were extremely difficult. Cologuard was in ; colo due again in . Denies any new issues.     Diabetes    Medication Refill      Allergies   Allergen Reactions    Oxycodone-Acetaminophen Rash       Past Medical History:   Diagnosis Date    Arthritis     back, shoulders, hand    Asthma     allergy triggered    Depression     anxiety    Diabetes ([address]     \"borderline\" fbs 109-125    GERD (gastroesophageal reflux disease)     medications prn    History of degenerative disc disease        Family History   Problem Relation Age of Onset    Breast Cancer Maternal Grandmother 76    Breast Cancer Maternal Aunt 79    Breast Cancer Other     Diabetes Mother     Pulmonary Embolism Sister     Stroke Mother     Breast Cancer Mother 72    Diabetes Father     Stroke Father        Social History     Socioeconomic History    Marital status:      Spouse name: Not on file    Number of children: Not on file    Years of education: Not on file    Highest education level: Not on file   Occupational History    Not on file   Tobacco Use    Smoking status: Never    Smokeless tobacco: Never    Tobacco comments:     Quit smokin years ago   Substance and Sexual Activity    Alcohol use: No    Drug use: No    Sexual activity: Not on file   Other Topics Concern    Not on file   Social History Narrative    Not on file     Social Determinants of Health     Financial Resource Strain: Medium Risk    Difficulty of Paying Living Expenses: Somewhat hard   Food Insecurity: Food Insecurity Present Worried About [email or web address] in the Last Year: Sometimes true    Marquise of Food in the Last Year: Sometimes true   Transportation Needs: No Transportation Needs    Lack of Transportation (Medical): No    Lack of Transportation (Non-Medical): No   Physical Activity: Not on file   Stress: Not on file   Social Connections: Not on file   Intimate Partner Violence: Not on file   Housing Stability: Not on file       OB History    No obstetric history on file.          Past Surgical History:   Procedure Laterality Date    CARPAL TUNNEL RELEASE  04/26/2021    Right hand    CHOLECYSTECTOMY      COLONOSCOPY      GASTRIC BYPASS SURGERY  2012    HERNIA REPAIR  2016 August Thursday 19 2016;     ORTHOPEDIC SURGERY Right     arm    ORTHOPEDIC SURGERY Right     shoulder surgery x 3    ORTHOPEDIC SURGERY Left     shoulder surgery     OTHER SURGICAL HISTORY      patient reports spinal ablation        Health Maintenance   Topic Date Due    HIV screen  Never done    Diabetic retinal exam  Never done    Cervical cancer screen  Never done    Shingles vaccine (1 of 2) Never done    COVID-19 Vaccine (4 - Booster for Pfizer series) 12/14/2021    Flu vaccine (1) 08/01/2022    Diabetic foot exam  03/29/2023    Diabetic Alb to Cr ratio (uACR) test  03/29/2023    Annual Wellness Visit (AWV)  03/30/2023    Lipids  09/06/2023    GFR test (Diabetes, CKD 3-4, OR last GFR 15-59)  09/06/2023    Breast cancer screen  09/29/2023    Depression Monitoring  12/12/2023    A1C test (Diabetic or Prediabetic)  01/18/2024    Colorectal Cancer Screen  02/25/2024    DTaP/Tdap/Td vaccine (2 - Td or Tdap) 01/21/2030    Pneumococcal 0-64 years Vaccine  Completed    Hepatitis C screen  Completed    Hepatitis A vaccine  Aged Out    Hib vaccine  Aged Out    Meningococcal (ACWY) vaccine  Aged Out         Current Outpatient Medications:     citalopram (CELEXA) 20 MG tablet, Take 1  1/2 po daily, Disp: 135 tablet, Rfl: 1    clotrimazole-betamethasone (Jerrilyn Model) 1-0.05 % cream, Apply topically 2 times daily. , Disp: 30 g, Rfl: 0    glipiZIDE (GLUCOTROL XL) 2.5 MG extended release tablet, Take 1 tablet by mouth daily, Disp: 90 tablet, Rfl: 1    montelukast (SINGULAIR) 10 MG tablet, Take 1 tablet by mouth daily, Disp: 90 tablet, Rfl: 1    omeprazole (PRILOSEC) 20 MG delayed release capsule, Take 1 capsule by mouth daily, Disp: 90 capsule, Rfl: 1    ondansetron (ZOFRAN) 4 MG tablet, Take 1 tablet by mouth every 8 hours as needed for Nausea, Disp: 10 tablet, Rfl: 1    topiramate (TOPAMAX) 25 MG tablet, Take 1 tablet by mouth daily 1 tab po daily, Disp: 90 tablet, Rfl: 0    albuterol sulfate HFA (PROVENTIL;VENTOLIN;PROAIR) 108 (90 Base) MCG/ACT inhaler, Inhale 2 puffs into the lungs every 6 hours as needed for Shortness of Breath, Disp: 18 g, Rfl: 1    fluticasone (FLONASE) 50 MCG/ACT nasal spray, 2 sprays by Nasal route daily, Disp: 16 g, Rfl: 2    albuterol (PROVENTIL) (2.5 MG/3ML) 0.083% nebulizer solution, Take 3 mLs by nebulization every 6 hours as needed for Wheezing or Shortness of Breath, Disp: 120 each, Rfl: 1    budesonide-formoterol (SYMBICORT) 80-4.5 MCG/ACT AERO, Inhale 2 puffs into the lungs 2 times daily, Disp: , Rfl:     vitamin D (CHOLECALCIFEROL) 25 MCG (1000 UT) TABS tablet, Take 1,000 Units by mouth daily, Disp: , Rfl:     diclofenac sodium (VOLTAREN) 1 % GEL, Apply topically, Disp: , Rfl:     HYDROcodone-acetaminophen (NORCO)  MG per tablet, Take 1 tablet by mouth every 6 hours as needed. , Disp: , Rfl:     naloxone 4 MG/0.1ML LIQD nasal spray, 4 mg by Nasal route as needed, Disp: , Rfl:     thiamine 100 MG tablet, Take 100 mg by mouth daily, Disp: , Rfl:     fluconazole (DIFLUCAN) 150 MG tablet, Take one now and one after completion of med, Disp: 2 tablet, Rfl: 0    gabapentin (NEURONTIN) 300 MG capsule, Take 300 mg by mouth. (Patient not taking: No sig reported), Disp: , Rfl:     Review of Systems   Constitutional: Negative. HENT: Negative. Eyes: Negative. Respiratory: Negative. Cardiovascular: Negative. Gastrointestinal: Negative. Endocrine: Negative. Genitourinary: Negative. Musculoskeletal: Negative. Skin: Negative. Allergic/Immunologic: Negative. Neurological: Negative. Hematological: Negative. Psychiatric/Behavioral: Negative. Vitals:    01/18/23 1008   BP: 138/70   Pulse: 68   Temp:    SpO2: 100%        Physical Exam  Constitutional:       Appearance: Normal appearance. HENT:      Head: Normocephalic. Nose: Nose normal.   Eyes:      Extraocular Movements: Extraocular movements intact. Pupils: Pupils are equal, round, and reactive to light. Cardiovascular:      Rate and Rhythm: Normal rate and regular rhythm. Pulmonary:      Effort: Pulmonary effort is normal.      Breath sounds: Normal breath sounds. Abdominal:      General: Abdomen is flat. Palpations: Abdomen is soft. Musculoskeletal:         General: Normal range of motion. Cervical back: Normal range of motion and neck supple. Skin:     General: Skin is warm and dry. Neurological:      General: No focal deficit present. Mental Status: She is alert and oriented to person, place, and time. Psychiatric:         Mood and Affect: Mood normal.         Behavior: Behavior normal.        PHQ-9=2      Assessment & Plan:    1. Type 2 diabetes mellitus without complication, without long-term current use of insulin ([facility])  stable  - glipiZIDE (GLUCOTROL XL) 2.5 MG extended release tablet; Take 1 tablet by mouth daily  Dispense: 90 tablet; Refill: 1  - AMB POC HEMOGLOBIN A1C    2. Major depressive disorder, recurrent, mild  stable  - citalopram (CELEXA) 20 MG tablet; Take 1  1/2 po daily  Dispense: 135 tablet; Refill: 1    3. Mild reactive airways disease, unspecified whether persistent  stable  - montelukast (SINGULAIR) 10 MG tablet; Take 1 tablet by mouth daily  Dispense: 90 tablet; Refill: 1    4.  Gastroesophageal reflux disease without esophagitis  stable  - omeprazole (PRILOSEC) 20 MG delayed release capsule; Take 1 capsule by mouth daily  Dispense: 90 capsule; Refill: 1  - Magnesium; Future  - Magnesium    5. Nonintractable headache, unspecified chronicity pattern, unspecified headache type  stable  - ondansetron (ZOFRAN) 4 MG tablet; Take 1 tablet by mouth every 8 hours as needed for Nausea  Dispense: 10 tablet; Refill: 1  - topiramate (TOPAMAX) 25 MG tablet; Take 1 tablet by mouth daily  Dispense: 90 tablet; Refill: 0    6. Mixed hyperlipidemia  stable  - Comprehensive Metabolic Panel; Future  - Lipid Panel; Future  - CBC with Auto Differential; Future  - CK; Future  - CK  - CBC with Auto Differential  - Lipid Panel  - Comprehensive Metabolic Panel    7. Class 2 obesity due to excess calories without serious comorbidity with body mass index (BMI) of 35.0 to 35.9 in adult  Exercise encouraged  - TSH; Future    Greater than 50% counseling and/or coordination of care: the treatment regimen is extensive; detailed review. Will notify of labs. Recheck in 3 months. This note was dictated using dragon voice recognition software. It has been proofread, but there may still exist voice recognition errors that the author did not detect.       Signed By: MARIA EUGENIA Mcnulty - CNP     January 18, 2023

## 2023-01-18 NOTE — RESULT ENCOUNTER NOTE
Results to patient please. A1c went from 6.6- to 7.4. Please encourage a diabetic diet. Is she willing to restart the metformin? Hemoglobin is 12.9. Total cholesterol is 184, triglycerides 102, HDL is 56, and LDL is 107.6.   Liver enzymes are normal.  Magnesium is now normal as well  Thanks

## 2023-01-24 ENCOUNTER — OFFICE VISIT (OUTPATIENT)
Dept: ORTHOPEDIC SURGERY | Age: 63
End: 2023-01-24

## 2023-01-24 DIAGNOSIS — G56.22 CUBITAL TUNNEL SYNDROME ON LEFT: ICD-10-CM

## 2023-01-24 DIAGNOSIS — S62.102A CLOSED FRACTURE OF LEFT WRIST, INITIAL ENCOUNTER: Primary | ICD-10-CM

## 2023-01-24 DIAGNOSIS — M18.12 ARTHRITIS OF CARPOMETACARPAL (CMC) JOINT OF LEFT THUMB: ICD-10-CM

## 2023-01-24 RX ORDER — MELOXICAM 15 MG/1
15 TABLET ORAL DAILY
Qty: 30 TABLET | Refills: 0 | Status: SHIPPED | OUTPATIENT
Start: 2023-01-24 | End: 2023-02-23

## 2023-01-24 NOTE — PROGRESS NOTES
Orthopaedic Hand Clinic Note    Name: Surjit Frias  Age: 60 y.o. YOB: 1960  Gender: female  MRN: [id number]      Follow up visit:   1. Closed fracture of left wrist, initial encounter    2. Arthritis of carpometacarpal (CMC) joint of left thumb    3. Cubital tunnel syndrome on left        HPI: Surjit Frias is a 60 y.o. female who is following up for left distal radius fracture treated conservatively 2 and half months ago, patient reports ongoing weakness and pain in the left wrist, she has also noticed intermittent numbness and paresthesias in the ulnar distribution of the left hand, mostly at nighttime, she is having significant pain at the base of the left thumb, especially when gripping, twisting, opening jars. ROS/Meds/PSH/PMH/FH/SH: I personally reviewed the patients standard intake form. Pertinents are discussed in the HPI    Physical Examination:  General: Awake and alert. HEENT: Normocephalic, atraumatic  CV/Pulm: Breathing even and unlabored  Skin: No obvious rashes noted. Lymphatic: No obvious evidence of lymphedema or lymphadenopathy    Musculoskeletal Examination:  Examination on the left upper extremity demonstrates cap refill < 5 seconds in all fingers, minimal tenderness palpation of the distal radius, minimal tenderness palpation of the radiocarpal joint, severe tenderness palpation of the left thumb CMC joint with a positive grind test, moderate tenderness palpation of the left TFCC fovea, DRUJ stable. Imaging / Electrodiagnostic Tests:     left Wrist XR: AP, Lateral, Oblique views     Clinical Indication:  1. Closed fracture of left wrist, initial encounter    2. Arthritis of carpometacarpal (CMC) joint of left thumb    3.  Cubital tunnel syndrome on left           Report: AP, lateral, oblique x-ray wrist XRs demonstrates distal radius fracture with neutral tilt, unchanged alignment from previous x-rays    Impression: as above     Kerri Escudero MD         Assessment:   1. Closed fracture of left wrist, initial encounter    2. Arthritis of carpometacarpal (CMC) joint of left thumb    3. Cubital tunnel syndrome on left        Plan:   We discussed the diagnosis and different treatment options. We discussed observation, therapy, antiinflammatory medications and other pertinent treatment modalities. After discussing in detail the patient elects to proceed with left thumb CMC joint steroid injection, Mobic 15 daily for 2 months, I will provide a elbow brace for nighttime use for cubital tunnel syndrome, we discussed that more likely than not she has a compressive neuropathy at the elbow, if this persist despite brace use then we will obtain a nerve conduction study, we discussed that pain in the wrist is expected at this point 2-1/2 months from the injury, she reports that the right side recover much quicker with surgery and I explained that that is expected but that the minimal deformity on the left wrist did not warrant surgery. Procedure Note    The risk, benefits and alternatives of injection and no injection therapy were discussed. The patient consented for an injection. Time out performed. The patient has been identified by name and birthdate. The injection site was identified, marked and prepped with a alcohol swab. The Left thumb CMC joint was injected with 0.5ml of 6mg/ml Celestone and 0.5ml of Lidocaine plain 1%. The injection site was then dressed with a bandaid. The patient tolerated the injection well. The patient was instructed to monitor their blood sugars if diabetic and call if any concerns. The patient was instructed to call the office if any adverse local effects occurred or any if any questions or concerns arise. Patient voiced accordance and understanding of the information provided and the formulated plan. All questions were answered to the patient's satisfaction during the encounter.     Yelena Miller MD  Orthopaedic Surgery  01/24/23  9:58 AM

## 2023-01-24 NOTE — LETTER
DME Patient Authorization Form    Name: Zan Keyes  : 1960  MRN: [id number]   Age: 60 y.o. Gender: female  Delivery Address: [facility]     Diagnosis:     ICD-10-CM    1. Closed fracture of left wrist, initial encounter  S62.102A XR WRIST LEFT (MIN 3 VIEWS)     Ambulatory referral to Occupational Therapy           Requested DME:  GelRod Elbow Orthosis -  ($147.00) 1 - left  CMC Splint- ($95.00) X 1 - left        Clinical Notes:     **Indicates non-covered items by insurance. Payment expected on date of service. Electronically signed by  Provider: Kristen Acharya MD__Date: 2023                            [facility] Tax ID # [id number]        Durable Medical Equipment and/or Orthotics Patient Consent     I understand that my physician has prescribed this medical supply as part of my treatment plan as a matter of Medical Necessity.  I understand that I have a choice in where I receive my prescribed orthopedic supplies and/or services.  I authorize [facility] to furnish this service/product and to provide my insurance carrier with any information requested in order to process for payment.  I instruct my insurance carrier to pay [facility] directly for these services/products.  I understand that my insurance carrier may deny payment for this supply because it is a non-covered item, deemed not medically necessary or considered experimental.   I understand that any cost not covered by my insurance carrier will be solely my financial responsibility.  I have received the Supplier Standards and have reviewed them.  I have received the prescribed item and have been fully instructed on the proper use of the above services/products.    ______ (Patient Initials) I understand that all DME items are non-returnable after being dispensed.  Items still in sealed packaging may be returned up to 14 days after purchasing. [address] will replace items that are defective.    ______ (Patient Initials) I understand that Tawnya Doll will not file a claim with my insurance carrier for this service/product and I am waiving my right to file a claim on my own for this service/product with my insurance company as this item is NON-COVERED (Denoted by the **) by my Insurance company/policy. ______ (Patient Initials) I understand that I am responsible to bring my equipment to the hospital for any surgery. ______________________________________________  ________________________  Patient / Martha Cheng            Thank you for considering [address] Your physician has prescribed specific medical equipment or devices for your home use. The following describes your rights and responsibilities as our customer. Right to Choose Providers: You have a choice regarding which company supplies your home medical equipment and devices, and to consult your physician in this decision. You may choose a medical supply store, a home medical equipment provider, or a specialist such as [facility]. [facility] will coordinate with your physician to provide the medical equipment or devices prescribed for your home use. Right to Service:  You have the right to considerate, respectful and nondiscriminatory care. You have the right to receive accurate and easily understood information about your health care. If you speak a foreign language, or don't understand the discussions, assistance will be provided to allow you to make informed health care decisions. You have the right to know your treatment options and to participate in decisions about your care, including the right to accept or refuse treatment.   You have the right to expect a reasonable response to your requests for treatment or service. You have the right to talk in confidence with health care providers and to have your health care information protected. You have the right to receive an explanation of your bill. You have the right to complain about the service or product you receive. Patient Responsibilities:  Please provide complete and accurate information about your health insurance benefits and make arrangements for the timely payment of your bill. [facility] will, if possible, assume responsibility for billing your insurance (Medicare, Medicaid and commercial) for the prescribed equipment or devices. If your policy does not cover the prescribed product, or only covers a portion of the bill, you are responsible for any remaining balance. Return and Exchange Policy:  [facility] will honor published  Warranties for products. [facility] will accept returns or exchanges within 14 days from the date of receipt, providin) the product must be in new condition; 2) receipt as required; and 3) used disposable and hygiene products may only be returned due to a defective product. Note: Refunds will be issued in a timely manner, please allow 4-6 weeks for processing. Complaint Procedures and DME Consumer Protection Resources:  [facility] values you as a customer, and is committed to resolving patient concerns. This commitment includes understanding and documenting your concerns, conducting a review of internal procedures, and providing you with an explanation and resolution to your concerns. Should you have any questions about our services or billing process, please contact our office at (practice phone number). If we are unable to resolve the concern, you have the right to direct comments to the office of Consumer Protection, in the [address] or the [facility] office, without fear of repercussion.     [address] [facility]    A supplier must be in compliance with all applicable Federal and State licensure and regulatory requirements. A supplier must provide complete and accurate information on the DMEPOS supplier application. Any changes to this information must be reported to the [facility] within 30 days. An authorized individual (one whose signature is binding) must sign the application for billing privileges. A supplier must fill orders from its own inventory, or must contract with other companies for the purchase of items necessary to fill the order. A supplier may not contract with any entity that is currently excluded from the Medicare program, any [facility] program, or from any other Federal procurement or Nonprocurement programs. A supplier must advise beneficiaries that they may rent or purchase inexpensive or routinely purchased durable medical equipment, and of the purchase option for capped rental equipment. A supplier must notify beneficiaries of warranty coverage and honor all warranties under applicable State Law, and repair or replace free of charge Medicare covered items that are under warranty. A supplier must maintain a physical facility on an appropriate site. A supplier must permit CMS, or its agents to conduct on-site inspections to ascertain the supplier's compliance with these standards. The supplier location must be accessible to beneficiaries during reasonable business hours, and must maintain a visible sign and posted hours of operation. A supplier must maintain a primary business telephone listed under the name of the business in a [employer] or a toll free number available through directory assistance. The exclusive use of a beeper, answering machine or cell phone is prohibited. A supplier must have comprehensive liability insurance in the amount of at least $300,000 that covers both the supplier's place of business and all customers and employees of the supplier.   If the supplier manufactures its own items, this insurance must also cover product liability and completed operations. A supplier must agree not to initiate telephone contact with beneficiaries, with a few exceptions allowed. This standard prohibits suppliers from calling beneficiaries in order to solicit new business. A supplier is responsible for delivery and must instruct beneficiaries on use of Medicare covered items, and maintain proof of delivery. A supplier must answer questions, and respond to complaints of the beneficiaries, and maintain documentation of such contacts. A supplier must maintain and replace at no charge or repair directly, or through a service contract with another company, Medicare covered items it has rented to beneficiaries. A supplier must accept returns of substandard (less than full quality for the particular item) or unsuitable items (inappropriate for the beneficiary at the time it was fitted and rented or sold) from beneficiaries. A supplier must disclose these supplier standards to each beneficiary to whom it supplies a Medicare-covered item. A supplier must disclose to the government any person having ownership, financial, or control interest in the supplier. A supplier must not convey or reassign a supplier number; i.e., the supplier may not sell or allow another entity to use its Medicare billing number. A supplier must have a complaint resolution protocol established to address beneficiary complaints that relate to these standards. A record of these complaints must be maintained at the physical facility. Complaint records must include: the name, address, telephone number and health insurance claim number of the beneficiary, a summary of the complaint, and any action taken to resolve it. A supplier must agree to furnish CMS any information required by the Medicare statute and implementing regulations.   A supplier of DMEPOS and other items and services must be accredited by a CMS-approved accreditation organization in order to receive and retain a supplier billing number. The accreditation must indicate the specific products and services, for which the supplier is accredited in order for the supplier to receive payment for those specific products and services. A DMEPOS supplier must notify their accreditation organization when a new DMEPOS location is opened. The accreditation organization may accredit the new supplier location for three months after it is operational without requiring a new site visit. All DMEPOS supplier locations, whether owned or subcontracted, must meet the [employer] and be separately accredited in order to bill Medicare. An accredited supplier may be denied enrollment or their enrollment may be revoked, if CMS determines that they are not in compliance with the DMEPOS quality standards. A DMEPOS supplier must disclose upon enrollment all products and services, including the addition of new product lines for which they are seeking accreditation. If a new product line is added after enrollment, the DMEPOS supplier will be responsible for notifying the accrediting body of the new product so that the DMEPOS supplier can be re-surveyed and accredited for these new products. Must meet the surety bond requirements specified in 42 C. F.R. 424.57(c). Implementation date- May 4, 2009. A supplier must obtain oxygen from a state-licensed oxygen supplier. A supplier must maintain ordering and referring documentation consistent with provisions found in 42 C. F.R. 424.516(f). DMEPOS suppliers are prohibited from sharing a practice location with certain other Medicare providers and suppliers. DMEPOS suppliers must remain open to the public for a minimum of 30 hours per week with certain exceptions.

## 2023-01-24 NOTE — PROGRESS NOTES
Patient was fitted and instructed on a GelRod Elbow Orthosis for the left elbow. Patient was fit for a CMC splint for patients left hand/joint. I demonstrated that the thumb slides into the opening and Velcro on the dorsal side of the hand. The strap continues around the thumb and Velcro's again on the ventral side of hand or palm, and then continues through the thumb and first finger to Velcro again on the dorsal side of hand. Patient read and signed documenting they understand and agree to [facility]'s current DME return policy.

## 2023-02-07 ENCOUNTER — OFFICE VISIT (OUTPATIENT)
Age: 63
End: 2023-02-07
Payer: MEDICARE

## 2023-02-07 DIAGNOSIS — M18.12 ARTHRITIS OF CARPOMETACARPAL (CMC) JOINT OF LEFT THUMB: ICD-10-CM

## 2023-02-07 DIAGNOSIS — M62.81 HAND MUSCLE WEAKNESS: ICD-10-CM

## 2023-02-07 DIAGNOSIS — M79.642 PAIN OF LEFT HAND: ICD-10-CM

## 2023-02-07 DIAGNOSIS — G56.22 CUBITAL TUNNEL SYNDROME ON LEFT: ICD-10-CM

## 2023-02-07 DIAGNOSIS — S52.572A OTHER INTRAARTICULAR FRACTURE OF LOWER END OF LEFT RADIUS, INITIAL ENCOUNTER FOR CLOSED FRACTURE: ICD-10-CM

## 2023-02-07 DIAGNOSIS — S62.102A CLOSED FRACTURE OF LEFT WRIST, INITIAL ENCOUNTER: Primary | ICD-10-CM

## 2023-02-07 PROCEDURE — 97018 PARAFFIN BATH THERAPY: CPT | Performed by: OCCUPATIONAL THERAPIST

## 2023-02-07 PROCEDURE — 97110 THERAPEUTIC EXERCISES: CPT | Performed by: OCCUPATIONAL THERAPIST

## 2023-02-07 PROCEDURE — 97165 OT EVAL LOW COMPLEX 30 MIN: CPT | Performed by: OCCUPATIONAL THERAPIST

## 2023-02-07 NOTE — PROGRESS NOTES
[address] [address]  Dept: [phone]      Occupational Therapy Initial Assessment     Referring MD: Anyi Marin MD    Diagnosis:     ICD-10-CM    1. Closed fracture of left wrist, initial encounter  S62.102A       2. Arthritis of carpometacarpal (CMC) joint of left thumb  M18.12       3. Cubital tunnel syndrome on left  G56.22       4. Other intraarticular fracture of lower end of left radius, initial encounter for closed fracture  S52.572A       5. Pain of left hand  M79.642       6. Hand muscle weakness  M62.81            Medical Dx: Arthritis of  [address] joint of left thumb; wrist and hand pain     Therapy precautions: None    History of injury/onset : Pt states the pain in her left thumb began after she was casted and splinted after a wrist fracture that occurred in November. She is also experiencing mild numbness in her index, middle and ring fingers. Total Direct Treatment Time: 30 min  Total In Office Time: 60 min    Preferred Name: Kaity Smith HISTORY     PMHX & Meds:   Past Medical History:   Diagnosis Date    Arthritis     back, shoulders, hand    Asthma     allergy triggered    Depression     anxiety    Diabetes ([facility] [address]     \"borderline\" fbs 109-125    GERD (gastroesophageal reflux disease)     medications prn    History of degenerative disc disease    ,   Past Surgical History:   Procedure Laterality Date    CARPAL TUNNEL RELEASE  04/26/2021    Right hand    CHOLECYSTECTOMY      COLONOSCOPY      GASTRIC BYPASS SURGERY  2012    HERNIA REPAIR  2016 August Thursday 19 2016;     ORTHOPEDIC SURGERY Right     arm    ORTHOPEDIC SURGERY Right     shoulder surgery x 3    ORTHOPEDIC SURGERY Left     shoulder surgery     OTHER SURGICAL HISTORY      patient reports spinal ablation       Medications. : Reviewed in chart  Allergies:    Allergies   Allergen Reactions    Oxycodone-Acetaminophen Rash        SUBJECTIVE     Current Symptoms/Chief complaints:   Chief Complaint   Patient presents with    Arm Pain              Chief complaint/history of injury:   Date symptoms began: 11/14/22  Baudette Chang of condition:Recent onset (initial onset within last 3 months)  Primary cause of current episode: Traumatic  How did symptoms start: began after being in a cast because of a wrist fracture  Describe current symptoms: pain in [facility] joint left thumb, pulling and pain in volar wrist    Received previous outpatient therapy? No      Pain Assessment:  Pain location: left hand  Average Pain/symptom intensity (0-10 scale)  Last 24 hours: 8-9/10  Last week (1-7 days): 8-9/10  How often do you feel symptoms? Frequently (51-75%)  Description: aching, burning, sharp, and throbbing  Aggravating factors: Alleviating factors: heat relieves pain, Medications for bilateral shoulder pain, Norco, Gabapentin, muscle relaxer    Social/Functional Hx:  Pt lives with family  Current DME: brace/splint  left hand  Work Status: Unemployed   Sleep: moderately disturbed  PLOF & Social Hx/Interests: Independent and active without physical limitations with ADL's  Current level of function: modified independent with ADL's     Neuro screen: numbness in digits, not thumb    Patient Stated Goals:  \"Be able to use my hand without pain\"    OBJECTIVE     Functional Outcome Measures: Quick Dash  43 score=   73 % functional deficit  Hand/Side Dominance: right handed  Observation/Posture: pt guarded with thumb  Palpation: tenderness base of thumb  Swelling/Edema: none noted  Skin Integrity: normal     AROM Measures:  Shoulder AROM RIGHT   IE LEFT  IE                          Shoulder screen  WFL                     Elbow  A/PROM RIGHT  IE LEFT  IE PROM:  IE  involved side           Elbow/Forearm Screen  WNL       Forearm/Wrist A/PROM RIGHT  IE LEFT  IE     Pronation/Supination 80/80 82/65     Wrist Extension/Flexion 70/65 55/32     RD/UD 20/35 10/25       Thumb A/PROM: RIGHT  IE LEFT  IE     MP ext/flex 47     IP ext/flex  55     Radial add/abduction  45     Palmar add/abduction  45     Opposition (Rajeevspenceryennifer Antolin):  8     Pt able to make a fist    /Pinch Strength  Deferred because of pain    Special Tests:  None performed   Evaluation Modifications/Assistance required : Verbal cues and Physical cues  Degree of assistance provided: minimal     Treatment:  Initial Evaluation: Low Complexity (14040)     Paraffin Dip to  left hand/wrist, (16395) x 10 minutes, to increase tissue extensibility/decrease pain  to prepare for treatment. Therapeutic exercise (96629) x 30 min:  Home Exercise Program development and Education: see below. Patient I with program following instruction and performance  Use of heat and ice as needed for pain and inflammation reduction. Precautions reviewed. OT POC and rationale, education for surgical precautions and pain management, edema management    Access Code: [email or web address]: [email or web address]  Date: 02/07/2023  Prepared by: Renata Mcneil    Exercises  Seated Forearm Pronation and Supination AROM - 2 x daily - 7 x weekly - 1 sets - 10 reps - 5 hold  Wrist Flexion Extension AROM - Palms Down - 2 x daily - 7 x weekly - 1 sets - 10 reps - 5 hold  Seated Wrist Radial and Ulnar Deviation AROM - 2 x daily - 7 x weekly - 1 sets - 10 reps - 5 hold  Seated Finger Composite Flexion Extension - 5 x daily - 7 x weekly - 1 sets - 10 reps - 5 hold  Finger MP Flexion AROM - 5 x daily - 7 x weekly - 1 sets - 10 reps - 5 hold  Seated Claw Fist AROM - 5 x daily - 7 x weekly - 1 sets - 10 reps - 5 hold  Individual Finger Extensions - 5 x daily - 7 x weekly - 1 sets - 10 reps - 5 hold  Thumb Opposition - 5 x daily - 7 x weekly - 1 sets - 10 reps - 5 hold  Thumb Abduction AROM on Table - 3-5 x weekly - 1-3 sets - 10-15 reps  Seated Thumb Palmar Abduction Adduction AROM - 3-5 x weekly - 1-3 sets - 10-15 reps  Seated Composite Thumb Flexion AROM - 3-5 x weekly - 1-3 sets - 10-15 reps  Thumb Strengthening Stabilization CMC - 3-5 x weekly - 1-3 sets - 10-15 reps  Thumb Strengthening Stabilization CMC First Dorsal Interossei - 3-5 x weekly - 1-3 sets - 10-15 reps    Kinesiotape, 2, 1\"  \"I\" strips applied base of thumb for support and to decrease pain    CLINICAL DECISION MAKING/ASSESSMENT     Performance Deficits  Physical: Mobility, Strength, and Sensation  Cognitive: No deficiencies noted  Psychosocial: No deficiencies noted  Rehab potential: good    The patient presents today with [facility] joint pain left hand s/p wrist fracture in November 2022. Pt presents with weakness, decreased ROM, pain and numbness left hand/wrist. These deficits appear to be secondary to wrist fracture November 2022. Based on these subjective and objective findings, the patient is perceived as currently having a primary functional deficit of  73% dysfunction. After a brief chart/PMH and OT profile review, evaluation requiring minimal  assistance/modifications and simple patient and data analysis, I have determined the patient exhibits several (1-3) performance deficits. Comorbidities do not affect the patient's occupational performance. The following performance deficits (including but not limited to physical, cognitive and/or psychosocial components) result in activity limitations and participation restrictions: Mobility, Strength, and Sensation    The patient would benefit from skilled occupational therapy services to address the deficits noted above for return to prior level of function. PLAN OF CARE     Effective Dates: 2/7/2023 TO 4/8/2023 (60 days).     Frequency/Duration: 1x/week for 60 Day(s)  Interventions may include but are not limited to: (33820) Therapeutic exercise to develop strength and endurance, range of motion, and flexibility, (14889) Manual Therapy:   Consisting of but not limited to hands on treatment by therapist for connective tissue massage, pain management, edema management, joint mobilization and manipulation, manual traction, passive range of motion, soft tissue and neural system mobilization/manipulation and therapeutic massage., (63239) Therapeutic activities:Direct one on one patient contact with provider, use of dynamic activities to improve functional performance, Modalities prn to address pain, spasms, and swelling: (65894) Hot/cold pack  (59773) Fluidotherapy  (00707) Paraffin, Home exercise program (HEP) development, (39855) Kineseotaping for Neuromuscular Re-education : Muscle inhibition or facilitation, space correction, to improve proprioception,; for endurance or correction of postural stabilizers; addressing trophic changes of complex regional pain syndrome, (18245) Kineseotaping for Manual Therapy Techniques for soft tissue mobilization, facilitation of muscles, pain management, lymphatic management, swelling management, scar mobilization, myofascial correction, mechanical joint correction or support, and (35313) Kineseotaping for Therapeutic Procedure/Exercise  for strengthening or lengthening a muscle. Used in conjunction with retraining posture, endurance and flexibility    The referring physician has reviewed and approved this evaluation and plan of care as noted by the electronic signature attached to note. GOALS   Short term goals:  3/7/2023  (4 weeks)  Patient will be I with HEP   Pt will be I and compliant with thumb stabilization exercises for the left thumb  No pain at rest left hand thumb/wrist  Increase AROM affected wrist flexion to at least 40 ° to improve function (from 32 deg)  Increase AROM affected wrist extension to at least 60 ° to improve function (from 55 deg)  Increase AROM affected forearm supination to at least 70 ° to improve function with ADL's.  (From 65 deg)  Pts Quick DASH functional assessment score will be less than 60 % functional deficit (from 73%)      Long term goals: 4/4/2023  (8 weeks)  Pt will have at least 65 °  of active wrist ext in the affected extremity for ability to perform activities like grooming and typing. Pt will have at least 65 °  of active wrist flexion in the affected extremity to improve function with writing and grooming. Pt will have at least 75 °  of AROM affected forearm supination to improve ability  with tasks like opening doors, hold a plate. Pt will have adequate  strength in hand on affected side to improve ability to grasp and hold an object and open containers  Patient will have adequate pinch strength left hand to improve ability to pinch during ADL's like zippers, opening bags and turning a key. Pt will be able to lift and carry items (ie grocery bags, laundry basket etc) with affected UE pain 2 of 10 or less. Pt will be able to sleep through the night with no pain in affected UE.   Pt will have decreased c/o numbness in left hand IF, MF, RF  Pts Quick DASH functional assessment score will be less than 20% functional deficit        [facility] Portal     OT Protocols

## 2023-02-24 ENCOUNTER — TELEPHONE (OUTPATIENT)
Age: 63
End: 2023-02-24

## 2023-02-24 NOTE — TELEPHONE ENCOUNTER
Contacted pt as she has not returned since her initial evaluation. She stated she could not make this week but would call back to reschedule. She did not want to schedule while on the phone with me.

## 2023-03-09 ENCOUNTER — OFFICE VISIT (OUTPATIENT)
Age: 63
End: 2023-03-09
Payer: MEDICARE

## 2023-03-09 DIAGNOSIS — M18.12 ARTHRITIS OF CARPOMETACARPAL (CMC) JOINT OF LEFT THUMB: ICD-10-CM

## 2023-03-09 DIAGNOSIS — G56.22 CUBITAL TUNNEL SYNDROME ON LEFT: ICD-10-CM

## 2023-03-09 DIAGNOSIS — M62.81 HAND MUSCLE WEAKNESS: ICD-10-CM

## 2023-03-09 DIAGNOSIS — M79.642 PAIN OF LEFT HAND: ICD-10-CM

## 2023-03-09 DIAGNOSIS — S62.102A CLOSED FRACTURE OF LEFT WRIST, INITIAL ENCOUNTER: Primary | ICD-10-CM

## 2023-03-09 PROCEDURE — 97110 THERAPEUTIC EXERCISES: CPT | Performed by: OCCUPATIONAL THERAPIST

## 2023-03-09 PROCEDURE — 97022 WHIRLPOOL THERAPY: CPT | Performed by: OCCUPATIONAL THERAPIST

## 2023-03-09 NOTE — PROGRESS NOTES
[address] [address]  Dept: [phone]      Occupational Therapy Daily Note     Referring MD: Lu Carter MD    Diagnosis:     ICD-10-CM    1. Closed fracture of left wrist, initial encounter  S62.102A       2. Arthritis of carpometacarpal (CMC) joint of left thumb  M18.12       3. Cubital tunnel syndrome on left  G56.22       4. Pain of left hand  M79.642       5. Hand muscle weakness  M62.81            Medical Dx: Arthritis of  Aia 16 joint of left thumb; wrist and hand pain     Therapy precautions: None    History of injury/onset : Pt states the pain in her left thumb began after she was casted and splinted after a wrist fracture that occurred in November. She is also experiencing mild numbness in her index, middle and ring fingers. Total Direct Treatment Time: 30 min  Total In Office Time: 45 min    Preferred Name: Tarik Carrillo HISTORY     PMHX & Meds:   Past Medical History:   Diagnosis Date    Arthritis     back, shoulders, hand    Asthma     allergy triggered    Depression     anxiety    Diabetes ([facility] [address]     \"borderline\" fbs 109-125    GERD (gastroesophageal reflux disease)     medications prn    History of degenerative disc disease    ,   Past Surgical History:   Procedure Laterality Date    CARPAL TUNNEL RELEASE  04/26/2021    Right hand    CHOLECYSTECTOMY      COLONOSCOPY      GASTRIC BYPASS SURGERY  2012    HERNIA REPAIR  2016 August Thursday 19 2016;     ORTHOPEDIC SURGERY Right     arm    ORTHOPEDIC SURGERY Right     shoulder surgery x 3    ORTHOPEDIC SURGERY Left     shoulder surgery     OTHER SURGICAL HISTORY      patient reports spinal ablation       Medications. : Reviewed in chart  Allergies:    Allergies   Allergen Reactions    Oxycodone-Acetaminophen Rash        SUBJECTIVE     Current Symptoms/Chief complaints:   Chief Complaint   Patient presents with    Arm Pain       Pain Assessment:  Pain location: left wrist  Average Pain/symptom intensity (0-10 scale)  Last 24 hours: 5/10  At worst 7/10    Neuro screen: numbness in digits, not thumb    Patient Stated Goals: \"Be able to use my hand without pain\"    OBJECTIVE       AROM Measures:  Shoulder AROM RIGHT   IE LEFT  IE                          Shoulder screen  [facility]                     Elbow  A/PROM RIGHT  IE LEFT  IE PROM:  IE  involved side           Elbow/Forearm Screen  WNL       Forearm/Wrist A/PROM RIGHT  IE LEFT  IE LEFT  3/9/23    Pronation/Supination 80/80 82/65 80/75    Wrist Extension/Flexion 70/65 55/32 60/45    RD/UD 20/35 10/25       Thumb A/PROM: RIGHT  IE LEFT  IE LEFT  3/9/23    MP ext/flex  54     IP ext/flex  55     Radial add/abduction  45     Palmar add/abduction  45     Opposition (Kapandji):  8 10    Pt able to make a fist     Strength  Right 41 lbs; left 22 lbs      Treatment:    Fluidotherapy (31730)  x 15 minutes to left hand/wrist for desensitization and preparation for treatment. Therapeutic exercise (64001) x 30 min:  Home Exercise Program development and Education: see below. Patient I with program following instruction and performance  Use of heat and ice as needed for pain and inflammation reduction. Precautions reviewed. OT POC and rationale, pain management, edema management  Reassessment    Access Code: [email or web address]  URL: [email or web address]  Date: 03/09/2023  Prepared by: Yoel Irizarry    Exercises  Ulnar Nerve Flossing - 2 x daily - 7 x weekly - 1 sets - 10 reps - 5 hold  Ulnar Nerve Flossing - 2 x daily - 7 x weekly - 1 sets - 10 reps - 5 hold  Median Nerve Flossing - 2 x daily - 7 x weekly - 1 sets - 10 reps - 5 hold  Median Nerve Flossing - Tray - 2 x daily - 7 x weekly - 1 sets - 10 reps - 5 hold      CLINICAL DECISION MAKING/ASSESSMENT     Pt has not attended therapy since her initial evaluation on  2/7/23. She has complaints of severe pain in volar wrist and numbness in her index, middle, and ring finger.  She states she does not wear her splints consistently. Her motion has improved. She rates herself as having a 91% functional deficit per the Quick DASH assessment which is worse than 73% at initial evaluation. Recommend pt continue with wrist and elbow splint at night and during the day as needed. PLAN OF CARE     Nerve gliding. GOALS   Short term goals:  3/7/2023  (4 weeks)  Patient will be I with HEP (pt reports compliance)  Pt will be I and compliant with thumb stabilization exercises for the left thumb (pt reports compliance)  No pain at rest left hand thumb/wrist (not met)  Increase AROM affected wrist flexion to at least 40 ° to improve function (from 32 deg) (met)  Increase AROM affected wrist extension to at least 60 ° to improve function (from 55 deg) (met)  Increase AROM affected forearm supination to at least 70 ° to improve function with ADL's. (From 65 deg)  Pts Quick DASH functional assessment score will be less than 60 % functional deficit (from 73%) (not met, 91%)      Long term goals: 4/4/2023  (8 weeks)  Pt will have at least 65 °  of active wrist ext in the affected extremity for ability to perform activities like grooming and typing. Pt will have at least 65 °  of active wrist flexion in the affected extremity to improve function with writing and grooming. Pt will have at least 75 °  of AROM affected forearm supination to improve ability  with tasks like opening doors, hold a plate. Pt will have adequate  strength in hand on affected side to improve ability to grasp and hold an object and open containers  Patient will have adequate pinch strength left hand to improve ability to pinch during ADL's like zippers, opening bags and turning a key. Pt will be able to lift and carry items (ie grocery bags, laundry basket etc) with affected UE pain 2 of 10 or less. Pt will be able to sleep through the night with no pain in affected UE.   Pt will have decreased c/o numbness in left hand IF, MF, RF  Pts Quick DASH functional assessment score will be less than 20% functional deficit        [email or web address] Portal   Access Code: [email or web address]  URL: [email or web address]  Date: 02/07/2023  Prepared by: Miguel Alfaro    Exercises  Seated Forearm Pronation and Supination AROM - 2 x daily - 7 x weekly - 1 sets - 10 reps - 5 hold  Wrist Flexion Extension AROM - Palms Down - 2 x daily - 7 x weekly - 1 sets - 10 reps - 5 hold  Seated Wrist Radial and Ulnar Deviation AROM - 2 x daily - 7 x weekly - 1 sets - 10 reps - 5 hold  Seated Finger Composite Flexion Extension - 5 x daily - 7 x weekly - 1 sets - 10 reps - 5 hold  Finger MP Flexion AROM - 5 x daily - 7 x weekly - 1 sets - 10 reps - 5 hold  Seated Claw Fist AROM - 5 x daily - 7 x weekly - 1 sets - 10 reps - 5 hold  Individual Finger Extensions - 5 x daily - 7 x weekly - 1 sets - 10 reps - 5 hold  Thumb Opposition - 5 x daily - 7 x weekly - 1 sets - 10 reps - 5 hold  Thumb Abduction AROM on Table - 3-5 x weekly - 1-3 sets - 10-15 reps  Seated Thumb Palmar Abduction Adduction AROM - 3-5 x weekly - 1-3 sets - 10-15 reps  Seated Composite Thumb Flexion AROM - 3-5 x weekly - 1-3 sets - 10-15 reps  Thumb Strengthening Stabilization CMC - 3-5 x weekly - 1-3 sets - 10-15 reps  Thumb Strengthening Stabilization CMC First Dorsal Interossei - 3-5 x weekly - 1-3 sets - 10-15 reps    Kinesiotape, 2, 1\"  \"I\" strips applied base of thumb for support and to decrease pain      OT Protocols

## 2023-03-14 ENCOUNTER — OFFICE VISIT (OUTPATIENT)
Age: 63
End: 2023-03-14
Payer: MEDICARE

## 2023-03-14 DIAGNOSIS — M62.81 HAND MUSCLE WEAKNESS: ICD-10-CM

## 2023-03-14 DIAGNOSIS — M18.12 ARTHRITIS OF CARPOMETACARPAL (CMC) JOINT OF LEFT THUMB: ICD-10-CM

## 2023-03-14 DIAGNOSIS — G56.22 CUBITAL TUNNEL SYNDROME ON LEFT: ICD-10-CM

## 2023-03-14 DIAGNOSIS — S62.102A CLOSED FRACTURE OF LEFT WRIST, INITIAL ENCOUNTER: Primary | ICD-10-CM

## 2023-03-14 DIAGNOSIS — M79.642 PAIN OF LEFT HAND: ICD-10-CM

## 2023-03-14 PROCEDURE — 97010 HOT OR COLD PACKS THERAPY: CPT | Performed by: OCCUPATIONAL THERAPIST

## 2023-03-14 PROCEDURE — 97110 THERAPEUTIC EXERCISES: CPT | Performed by: OCCUPATIONAL THERAPIST

## 2023-03-14 NOTE — PROGRESS NOTES
[address] [address]  Dept: [phone]      Occupational Therapy Daily Note     Referring MD: Jovan Roberto MD    Diagnosis:     ICD-10-CM    1. Closed fracture of left wrist, initial encounter  S62.102A       2. Pain of left hand  M79.642       3. Hand muscle weakness  M62.81       4. Arthritis of carpometacarpal (CMC) joint of left thumb  M18.12       5. Cubital tunnel syndrome on left  G56.22            Medical Dx: Arthritis of  Aia 16 joint of left thumb; wrist and hand pain     Therapy precautions: None    History of injury/onset : Pt states the pain in her left thumb began after she was casted and splinted after a wrist fracture that occurred in November. She is also experiencing mild numbness in her index, middle and ring fingers. Total Direct Treatment Time: 35 min  Total In Office Time: 45 min    Preferred Name: Ezra Harpreet HISTORY     PMHX & Meds:   Past Medical History:   Diagnosis Date    Arthritis     back, shoulders, hand    Asthma     allergy triggered    Depression     anxiety    Diabetes ([facility] [address]     \"borderline\" fbs 109-125    GERD (gastroesophageal reflux disease)     medications prn    History of degenerative disc disease    ,   Past Surgical History:   Procedure Laterality Date    CARPAL TUNNEL RELEASE  04/26/2021    Right hand    CHOLECYSTECTOMY      COLONOSCOPY      GASTRIC BYPASS SURGERY  2012    HERNIA REPAIR  2016 August Thursday 19 2016;     ORTHOPEDIC SURGERY Right     arm    ORTHOPEDIC SURGERY Right     shoulder surgery x 3    ORTHOPEDIC SURGERY Left     shoulder surgery     OTHER SURGICAL HISTORY      patient reports spinal ablation       Medications. : Reviewed in chart  Allergies:    Allergies   Allergen Reactions    Oxycodone-Acetaminophen Rash        SUBJECTIVE     Current Symptoms/Chief complaints:   Chief Complaint   Patient presents with    Arm Pain     Neuro screen: numbness in digits, not thumb    Patient Stated Goals: \"Be able to use my hand without pain\"    Pt reports \"When I go to reach, it pops and then my hand goes numb\"    OBJECTIVE       AROM Measures:  Shoulder AROM RIGHT   IE LEFT  IE                          Shoulder screen  [facility]                     Elbow  A/PROM RIGHT  IE LEFT  IE PROM:  IE  involved side           Elbow/Forearm Screen  WNL       Forearm/Wrist A/PROM RIGHT  IE LEFT  IE LEFT  3/9/23    Pronation/Supination 80/80 82/65 80/75    Wrist Extension/Flexion 70/65 55/32 60/45    RD/UD 20/35 10/25       Thumb A/PROM: RIGHT  IE LEFT  IE LEFT  3/9/23    MP ext/flex  54     IP ext/flex  55     Radial add/abduction  45     Palmar add/abduction  45     Opposition (Kannanbrian Bhatiaa):  8 10    Pt able to make a fist     Strength  Right 41 lbs; left 22 lbs      Treatment:      Hot Pack (86013) x 10 minutes to left UE prior to treatment for moist heat/tissue extensibility in preparation for treatment. Therapeutic exercise (83785) x 35 min:  AROM wrist flex/ext  Theraputty (yellow), Flexbar presses, 20  Theraputty (yellow) Puttycise tools, large knob, small knob, L-bar, 20 each  Theraputty, , roll, pinch, extend, 5 sets  Wall slides, 10  Large pegboard for reaching, 20  Sci-fit x 2 minutes  Median nerve glides  Ulnar nerve glides      CLINICAL DECISION MAKING/ASSESSMENT   Pt continues to c/o deep aching pain at carpal tunnel. Numbness all digits. PLAN OF CARE     Nerve gliding and strengthening left UE.     GOALS   Short term goals:  3/7/2023  (4 weeks)  Patient will be I with HEP (pt reports compliance)  Pt will be I and compliant with thumb stabilization exercises for the left thumb (pt reports compliance)  No pain at rest left hand thumb/wrist (not met)  Increase AROM affected wrist flexion to at least 40 ° to improve function (from 32 deg) (met)  Increase AROM affected wrist extension to at least 60 ° to improve function (from 55 deg) (met)  Increase AROM affected forearm supination to at least 70 ° to improve function with ADL's. (From 65 deg)  Pts Quick DASH functional assessment score will be less than 60 % functional deficit (from 73%) (not met, 91%)      Long term goals: 4/4/2023  (8 weeks)  Pt will have at least 65 °  of active wrist ext in the affected extremity for ability to perform activities like grooming and typing. Pt will have at least 65 °  of active wrist flexion in the affected extremity to improve function with writing and grooming. Pt will have at least 75 °  of AROM affected forearm supination to improve ability  with tasks like opening doors, hold a plate. Pt will have adequate  strength in hand on affected side to improve ability to grasp and hold an object and open containers  Patient will have adequate pinch strength left hand to improve ability to pinch during ADL's like zippers, opening bags and turning a key. Pt will be able to lift and carry items (ie grocery bags, laundry basket etc) with affected UE pain 2 of 10 or less. Pt will be able to sleep through the night with no pain in affected UE. Pt will have decreased c/o numbness in left hand IF, MF, RF  Pts Quick DASH functional assessment score will be less than 20% functional deficit        [employer] Portal   Access Code: [email or web address]  URL: [email or web address]  Date: 02/07/2023  Prepared by: Amrit Cutler    Exercises  Seated Forearm Pronation and Supination AROM - 2 x daily - 7 x weekly - 1 sets - 10 reps - 5 hold  Wrist Flexion Extension AROM - Palms Down - 2 x daily - 7 x weekly - 1 sets - 10 reps - 5 hold  Seated Wrist Radial and Ulnar Deviation AROM - 2 x daily - 7 x weekly - 1 sets - 10 reps - 5 hold  Seated Finger Composite Flexion Extension - 5 x daily - 7 x weekly - 1 sets - 10 reps - 5 hold  Finger MP Flexion AROM - 5 x daily - 7 x weekly - 1 sets - 10 reps - 5 hold  Seated Claw Fist AROM - 5 x daily - 7 x weekly - 1 sets - 10 reps - 5 hold  Individual Finger Extensions - 5 x daily - 7 x weekly - 1 sets - 10 reps - 5 hold  Thumb Opposition - 5 x daily - 7 x weekly - 1 sets - 10 reps - 5 hold  Thumb Abduction AROM on Table - 3-5 x weekly - 1-3 sets - 10-15 reps  Seated Thumb Palmar Abduction Adduction AROM - 3-5 x weekly - 1-3 sets - 10-15 reps  Seated Composite Thumb Flexion AROM - 3-5 x weekly - 1-3 sets - 10-15 reps  Thumb Strengthening Stabilization CMC - 3-5 x weekly - 1-3 sets - 10-15 reps  Thumb Strengthening Stabilization CMC First Dorsal Interossei - 3-5 x weekly - 1-3 sets - 10-15 reps    Kinesiotape, 2, 1\"  \"I\" strips applied base of thumb for support and to decrease pain    Access Code: [email or web address]  URL: [email or web address]  Date: 03/09/2023  Prepared by: Darwin Khoury    Exercises  Ulnar Nerve Flossing - 2 x daily - 7 x weekly - 1 sets - 10 reps - 5 hold  Ulnar Nerve Flossing - 2 x daily - 7 x weekly - 1 sets - 10 reps - 5 hold  Median Nerve Flossing - 2 x daily - 7 x weekly - 1 sets - 10 reps - 5 hold  Median Nerve Flossing - Tray - 2 x daily - 7 x weekly - 1 sets - 10 reps - 5 hold      OT Protocols

## 2023-03-28 ENCOUNTER — OFFICE VISIT (OUTPATIENT)
Dept: ORTHOPEDIC SURGERY | Age: 63
End: 2023-03-28
Payer: MEDICARE

## 2023-03-28 DIAGNOSIS — S62.102A CLOSED FRACTURE OF LEFT WRIST, INITIAL ENCOUNTER: ICD-10-CM

## 2023-03-28 DIAGNOSIS — G56.02 LEFT CARPAL TUNNEL SYNDROME: ICD-10-CM

## 2023-03-28 DIAGNOSIS — M18.12 ARTHRITIS OF CARPOMETACARPAL (CMC) JOINT OF LEFT THUMB: Primary | ICD-10-CM

## 2023-03-28 DIAGNOSIS — G56.22 CUBITAL TUNNEL SYNDROME ON LEFT: ICD-10-CM

## 2023-03-28 PROCEDURE — 3017F COLORECTAL CA SCREEN DOC REV: CPT | Performed by: ORTHOPAEDIC SURGERY

## 2023-03-28 PROCEDURE — 1036F TOBACCO NON-USER: CPT | Performed by: ORTHOPAEDIC SURGERY

## 2023-03-28 PROCEDURE — G8417 CALC BMI ABV UP PARAM F/U: HCPCS | Performed by: ORTHOPAEDIC SURGERY

## 2023-03-28 PROCEDURE — 99214 OFFICE O/P EST MOD 30 MIN: CPT | Performed by: ORTHOPAEDIC SURGERY

## 2023-03-28 PROCEDURE — G8484 FLU IMMUNIZE NO ADMIN: HCPCS | Performed by: ORTHOPAEDIC SURGERY

## 2023-03-28 PROCEDURE — G8428 CUR MEDS NOT DOCUMENT: HCPCS | Performed by: ORTHOPAEDIC SURGERY

## 2023-03-28 RX ORDER — METHYLPREDNISOLONE 4 MG/1
TABLET ORAL
Qty: 1 KIT | Refills: 0 | Status: SHIPPED | OUTPATIENT
Start: 2023-03-28

## 2023-03-28 NOTE — PROGRESS NOTES
Orthopaedic Hand Clinic Note    Name: Analia Dumont  Age: 60 y.o. YOB: 1960  Gender: female  MRN: [id number]      Follow up visit:   1. Arthritis of carpometacarpal (CMC) joint of left thumb    2. Closed fracture of left wrist, initial encounter    3. Cubital tunnel syndrome on left    4. Left carpal tunnel syndrome        HPI: Analia Dumont is a 60 y.o. female who is following up for left distal radius fracture 4 months ago, she has developed numbness and paresthesias in ulnar distribution but more recently she developed numbness in all fingers of the hand that wakes her up at night, she also has pain at the base of the left thumb which we have performed injections for and it is somewhat better. ROS/Meds/PSH/PMH/FH/SH: I personally reviewed the patients standard intake form. Pertinents are discussed in the HPI    Physical Examination:  General: Awake and alert. HEENT: Normocephalic, atraumatic  CV/Pulm: Breathing even and unlabored  Skin: No obvious rashes noted. Lymphatic: No obvious evidence of lymphedema or lymphadenopathy    Musculoskeletal Examination:  Examination on the left upper extremity demonstrates cap refill < 5 seconds in all fingers, mild tenderness ovation of the left thumb CMC joint, patient does have a positive carpal tunnel compression test and positive Tinel for carpal tunnel syndrome, positive Tinel for cubital tunnel syndrome. Imaging / Electrodiagnostic Tests:     Nerve conduction study ordered today    Assessment:   1. Arthritis of carpometacarpal (CMC) joint of left thumb    2. Closed fracture of left wrist, initial encounter    3. Cubital tunnel syndrome on left    4. Left carpal tunnel syndrome        Plan:   We discussed the diagnosis and different treatment options. We discussed observation, therapy, antiinflammatory medications and other pertinent treatment modalities.     After discussing in detail the patient elects to proceed with

## 2023-04-18 ENCOUNTER — PROCEDURE VISIT (OUTPATIENT)
Dept: PHYSICAL MEDICINE AND REHAB | Age: 63
End: 2023-04-18

## 2023-04-18 VITALS
DIASTOLIC BLOOD PRESSURE: 78 MMHG | BODY MASS INDEX: 35.3 KG/M2 | HEART RATE: 70 BPM | HEIGHT: 61 IN | WEIGHT: 187 LBS | SYSTOLIC BLOOD PRESSURE: 143 MMHG

## 2023-04-18 DIAGNOSIS — R20.0 NUMBNESS OF RIGHT HAND: ICD-10-CM

## 2023-04-18 DIAGNOSIS — G56.02 LEFT CARPAL TUNNEL SYNDROME: Primary | ICD-10-CM

## 2023-05-09 ENCOUNTER — TELEPHONE (OUTPATIENT)
Dept: FAMILY MEDICINE CLINIC | Facility: CLINIC | Age: 63
End: 2023-05-09

## 2023-05-09 ENCOUNTER — OFFICE VISIT (OUTPATIENT)
Dept: ORTHOPEDIC SURGERY | Age: 63
End: 2023-05-09
Payer: MEDICARE

## 2023-05-09 DIAGNOSIS — G56.22 CUBITAL TUNNEL SYNDROME ON LEFT: ICD-10-CM

## 2023-05-09 DIAGNOSIS — G56.02 LEFT CARPAL TUNNEL SYNDROME: ICD-10-CM

## 2023-05-09 DIAGNOSIS — M18.12 ARTHRITIS OF CARPOMETACARPAL (CMC) JOINT OF LEFT THUMB: Primary | ICD-10-CM

## 2023-05-09 PROCEDURE — G8417 CALC BMI ABV UP PARAM F/U: HCPCS | Performed by: ORTHOPAEDIC SURGERY

## 2023-05-09 PROCEDURE — G8428 CUR MEDS NOT DOCUMENT: HCPCS | Performed by: ORTHOPAEDIC SURGERY

## 2023-05-09 PROCEDURE — 99214 OFFICE O/P EST MOD 30 MIN: CPT | Performed by: ORTHOPAEDIC SURGERY

## 2023-05-09 PROCEDURE — 1036F TOBACCO NON-USER: CPT | Performed by: ORTHOPAEDIC SURGERY

## 2023-05-09 PROCEDURE — 3017F COLORECTAL CA SCREEN DOC REV: CPT | Performed by: ORTHOPAEDIC SURGERY

## 2023-05-09 NOTE — PROGRESS NOTES
Orthopaedic Hand Clinic Note    Name: Leonardo Handley  Age: 60 y.o. YOB: 1960  Gender: female  MRN: [id number]      Follow up visit:   1. Arthritis of carpometacarpal (CMC) joint of left thumb    2. Cubital tunnel syndrome on left    3. Left carpal tunnel syndrome        HPI: Leonardo Handley is a 60 y.o. female who is following up for left hand numbness and paresthesias, left thumb CMC pain. ROS/Meds/PSH/PMH/FH/SH: I personally reviewed the patients standard intake form. Pertinents are discussed in the HPI    Physical Examination:  General: Awake and alert. HEENT: Normocephalic, atraumatic  CV/Pulm: Breathing even and unlabored  Skin: No obvious rashes noted. Lymphatic: No obvious evidence of lymphedema or lymphadenopathy    Musculoskeletal Examination:  Examination on the left upper extremity demonstrates cap refill < 5 seconds in all fingers, severe tense palpation of the left thumb CMC joint, the left thumb MCP joint does not hyperextend, she has positive provocative maneuvers for carpal and cubital tunnel syndrome but no thenar or interossei weakness or wasting. Imaging / Electrodiagnostic Tests:     Nerve conduction study was reviewed with the patient, this demonstrates severe carpal tunnel syndrome on the left side with sensory latency of 4.3 with sensory amplitude of 10.2, motor or sensory of 5.3, normal EMG    Previous x-ray of the left hand was reviewed, this demonstrates a healed distal radius fracture with neutral tilt, no significant articular step-off, bone-on-bone arthritis of the left thumb CMC joint, STT joint appears well-preserved    Assessment:   1. Arthritis of carpometacarpal (CMC) joint of left thumb    2. Cubital tunnel syndrome on left    3. Left carpal tunnel syndrome        Plan:   We discussed the diagnosis and different treatment options.  We discussed observation, therapy, antiinflammatory medications and other pertinent treatment

## 2023-05-09 NOTE — TELEPHONE ENCOUNTER
----- Message from Zaida Jordon sent at 5/9/2023 10:31 AM EDT -----  Subject: Appointment Request    Reason for Call: Established Patient Appointment needed: Routine Physical   Exam    QUESTIONS    Reason for appointment request? No appointments available during search     Additional Information for Provider? Would like the Medical Wellness   visit, her surgery falls on the same date 5/25. needs one before or after   that date.  5/10 9:30 she was not able to come, and would like to be seen   sooner than 6/29  ---------------------------------------------------------------------------  --------------  Hillary HASSAN  [phone]; OK to leave message on voicemail  ---------------------------------------------------------------------------  --------------  SCRIPT ANSWERS  COVID Screen: Wesley Or

## 2023-05-10 ENCOUNTER — OFFICE VISIT (OUTPATIENT)
Dept: FAMILY MEDICINE CLINIC | Facility: CLINIC | Age: 63
End: 2023-05-10
Payer: MEDICARE

## 2023-05-10 VITALS
SYSTOLIC BLOOD PRESSURE: 138 MMHG | HEART RATE: 75 BPM | WEIGHT: 190.8 LBS | OXYGEN SATURATION: 98 % | TEMPERATURE: 98.3 F | BODY MASS INDEX: 36.02 KG/M2 | DIASTOLIC BLOOD PRESSURE: 70 MMHG | HEIGHT: 61 IN

## 2023-05-10 DIAGNOSIS — E11.9 TYPE 2 DIABETES MELLITUS WITHOUT COMPLICATION, WITHOUT LONG-TERM CURRENT USE OF INSULIN (HCC): Primary | ICD-10-CM

## 2023-05-10 DIAGNOSIS — E78.2 MIXED HYPERLIPIDEMIA: ICD-10-CM

## 2023-05-10 DIAGNOSIS — Z78.0 POSTMENOPAUSE: ICD-10-CM

## 2023-05-10 DIAGNOSIS — E66.01 SEVERE OBESITY (BMI 35.0-39.9) WITH COMORBIDITY (HCC): ICD-10-CM

## 2023-05-10 DIAGNOSIS — Z23 ENCOUNTER FOR IMMUNIZATION: ICD-10-CM

## 2023-05-10 DIAGNOSIS — Z00.00 ENCOUNTER FOR SUBSEQUENT ANNUAL WELLNESS VISIT (AWV) IN MEDICARE PATIENT: ICD-10-CM

## 2023-05-10 DIAGNOSIS — E11.9 TYPE 2 DIABETES MELLITUS WITHOUT COMPLICATION, WITHOUT LONG-TERM CURRENT USE OF INSULIN (HCC): ICD-10-CM

## 2023-05-10 DIAGNOSIS — B96.89 ACUTE BACTERIAL SINUSITIS: ICD-10-CM

## 2023-05-10 DIAGNOSIS — K21.9 GASTROESOPHAGEAL REFLUX DISEASE WITHOUT ESOPHAGITIS: ICD-10-CM

## 2023-05-10 DIAGNOSIS — J06.9 VIRAL URI: ICD-10-CM

## 2023-05-10 DIAGNOSIS — J01.90 ACUTE BACTERIAL SINUSITIS: ICD-10-CM

## 2023-05-10 LAB
BASOPHILS # BLD: 0 K/UL (ref 0–0.2)
BASOPHILS NFR BLD: 1 % (ref 0–2)
DIFFERENTIAL METHOD BLD: ABNORMAL
EOSINOPHIL # BLD: 0.3 K/UL (ref 0–0.8)
EOSINOPHIL NFR BLD: 4 % (ref 0.5–7.8)
ERYTHROCYTE [DISTWIDTH] IN BLOOD BY AUTOMATED COUNT: 15.7 % (ref 11.9–14.6)
HBA1C MFR BLD: 6.8 %
HCT VFR BLD AUTO: 37.9 % (ref 35.8–46.3)
HGB BLD-MCNC: 11.8 G/DL (ref 11.7–15.4)
IMM GRANULOCYTES # BLD AUTO: 0 K/UL (ref 0–0.5)
IMM GRANULOCYTES NFR BLD AUTO: 0 % (ref 0–5)
LYMPHOCYTES # BLD: 2.2 K/UL (ref 0.5–4.6)
LYMPHOCYTES NFR BLD: 27 % (ref 13–44)
MCH RBC QN AUTO: 28 PG (ref 26.1–32.9)
MCHC RBC AUTO-ENTMCNC: 31.1 G/DL (ref 31.4–35)
MCV RBC AUTO: 89.8 FL (ref 82–102)
MICROALBUMIN URINE, POC: 30 MG/L
MONOCYTES # BLD: 0.6 K/UL (ref 0.1–1.3)
MONOCYTES NFR BLD: 8 % (ref 4–12)
NEUTS SEG # BLD: 5 K/UL (ref 1.7–8.2)
NEUTS SEG NFR BLD: 60 % (ref 43–78)
NRBC # BLD: 0 K/UL (ref 0–0.2)
PLATELET # BLD AUTO: 371 K/UL (ref 150–450)
PMV BLD AUTO: 10.7 FL (ref 9.4–12.3)
RBC # BLD AUTO: 4.22 M/UL (ref 4.05–5.2)
WBC # BLD AUTO: 8.1 K/UL (ref 4.3–11.1)

## 2023-05-10 PROCEDURE — G0439 PPPS, SUBSEQ VISIT: HCPCS | Performed by: NURSE PRACTITIONER

## 2023-05-10 PROCEDURE — G8427 DOCREV CUR MEDS BY ELIG CLIN: HCPCS | Performed by: NURSE PRACTITIONER

## 2023-05-10 PROCEDURE — 2022F DILAT RTA XM EVC RTNOPTHY: CPT | Performed by: NURSE PRACTITIONER

## 2023-05-10 PROCEDURE — 99213 OFFICE O/P EST LOW 20 MIN: CPT | Performed by: NURSE PRACTITIONER

## 2023-05-10 PROCEDURE — 83036 HEMOGLOBIN GLYCOSYLATED A1C: CPT | Performed by: NURSE PRACTITIONER

## 2023-05-10 PROCEDURE — 82044 UR ALBUMIN SEMIQUANTITATIVE: CPT | Performed by: NURSE PRACTITIONER

## 2023-05-10 PROCEDURE — 1036F TOBACCO NON-USER: CPT | Performed by: NURSE PRACTITIONER

## 2023-05-10 PROCEDURE — 3017F COLORECTAL CA SCREEN DOC REV: CPT | Performed by: NURSE PRACTITIONER

## 2023-05-10 PROCEDURE — G8417 CALC BMI ABV UP PARAM F/U: HCPCS | Performed by: NURSE PRACTITIONER

## 2023-05-10 PROCEDURE — 3046F HEMOGLOBIN A1C LEVEL >9.0%: CPT | Performed by: NURSE PRACTITIONER

## 2023-05-10 RX ORDER — ZOSTER VACCINE RECOMBINANT, ADJUVANTED 50 MCG/0.5
0.5 KIT INTRAMUSCULAR SEE ADMIN INSTRUCTIONS
Qty: 0.5 ML | Refills: 1 | Status: SHIPPED | OUTPATIENT
Start: 2023-05-10 | End: 2023-05-11

## 2023-05-10 RX ORDER — FLUCONAZOLE 150 MG/1
150 TABLET ORAL ONCE
Qty: 1 TABLET | Refills: 0 | Status: SHIPPED | OUTPATIENT
Start: 2023-05-10 | End: 2023-05-10

## 2023-05-10 RX ORDER — CEFDINIR 300 MG/1
300 CAPSULE ORAL 2 TIMES DAILY
Qty: 14 CAPSULE | Refills: 0 | Status: SHIPPED | OUTPATIENT
Start: 2023-05-10 | End: 2023-05-17

## 2023-05-10 SDOH — HEALTH STABILITY: PHYSICAL HEALTH: ON AVERAGE, HOW MANY MINUTES DO YOU ENGAGE IN EXERCISE AT THIS LEVEL?: 30 MIN

## 2023-05-10 SDOH — ECONOMIC STABILITY: INCOME INSECURITY: HOW HARD IS IT FOR YOU TO PAY FOR THE VERY BASICS LIKE FOOD, HOUSING, MEDICAL CARE, AND HEATING?: PATIENT DECLINED

## 2023-05-10 SDOH — ECONOMIC STABILITY: FOOD INSECURITY: WITHIN THE PAST 12 MONTHS, THE FOOD YOU BOUGHT JUST DIDN'T LAST AND YOU DIDN'T HAVE MONEY TO GET MORE.: PATIENT DECLINED

## 2023-05-10 SDOH — ECONOMIC STABILITY: FOOD INSECURITY: WITHIN THE PAST 12 MONTHS, YOU WORRIED THAT YOUR FOOD WOULD RUN OUT BEFORE YOU GOT MONEY TO BUY MORE.: PATIENT DECLINED

## 2023-05-10 SDOH — ECONOMIC STABILITY: HOUSING INSECURITY
IN THE LAST 12 MONTHS, WAS THERE A TIME WHEN YOU DID NOT HAVE A STEADY PLACE TO SLEEP OR SLEPT IN A SHELTER (INCLUDING NOW)?: NO

## 2023-05-10 SDOH — HEALTH STABILITY: PHYSICAL HEALTH: ON AVERAGE, HOW MANY DAYS PER WEEK DO YOU ENGAGE IN MODERATE TO STRENUOUS EXERCISE (LIKE A BRISK WALK)?: 3 DAYS

## 2023-05-10 ASSESSMENT — PATIENT HEALTH QUESTIONNAIRE - PHQ9
8. MOVING OR SPEAKING SO SLOWLY THAT OTHER PEOPLE COULD HAVE NOTICED. OR THE OPPOSITE, BEING SO FIGETY OR RESTLESS THAT YOU HAVE BEEN MOVING AROUND A LOT MORE THAN USUAL: 0
SUM OF ALL RESPONSES TO PHQ QUESTIONS 1-9: 2
SUM OF ALL RESPONSES TO PHQ QUESTIONS 1-9: 2
5. POOR APPETITE OR OVEREATING: 0
10. IF YOU CHECKED OFF ANY PROBLEMS, HOW DIFFICULT HAVE THESE PROBLEMS MADE IT FOR YOU TO DO YOUR WORK, TAKE CARE OF THINGS AT HOME, OR GET ALONG WITH OTHER PEOPLE: 1
1. LITTLE INTEREST OR PLEASURE IN DOING THINGS: 1
6. FEELING BAD ABOUT YOURSELF - OR THAT YOU ARE A FAILURE OR HAVE LET YOURSELF OR YOUR FAMILY DOWN: 2
2. FEELING DOWN, DEPRESSED OR HOPELESS: 1
SUM OF ALL RESPONSES TO PHQ QUESTIONS 1-9: 9
1. LITTLE INTEREST OR PLEASURE IN DOING THINGS: 1
4. FEELING TIRED OR HAVING LITTLE ENERGY: 3
SUM OF ALL RESPONSES TO PHQ QUESTIONS 1-9: 2
SUM OF ALL RESPONSES TO PHQ9 QUESTIONS 1 & 2: 2
SUM OF ALL RESPONSES TO PHQ QUESTIONS 1-9: 2
7. TROUBLE CONCENTRATING ON THINGS, SUCH AS READING THE NEWSPAPER OR WATCHING TELEVISION: 0
SUM OF ALL RESPONSES TO PHQ QUESTIONS 1-9: 9
3. TROUBLE FALLING OR STAYING ASLEEP: 2
9. THOUGHTS THAT YOU WOULD BE BETTER OFF DEAD, OR OF HURTING YOURSELF: 0
SUM OF ALL RESPONSES TO PHQ9 QUESTIONS 1 & 2: 2
SUM OF ALL RESPONSES TO PHQ QUESTIONS 1-9: 9
SUM OF ALL RESPONSES TO PHQ QUESTIONS 1-9: 9
2. FEELING DOWN, DEPRESSED OR HOPELESS: 1

## 2023-05-10 ASSESSMENT — ENCOUNTER SYMPTOMS
SINUS PRESSURE: 1
EYES NEGATIVE: 1
ALLERGIC/IMMUNOLOGIC NEGATIVE: 1
COUGH: 1
GASTROINTESTINAL NEGATIVE: 1

## 2023-05-10 ASSESSMENT — LIFESTYLE VARIABLES
HOW OFTEN DO YOU HAVE A DRINK CONTAINING ALCOHOL: NEVER
HOW MANY STANDARD DRINKS CONTAINING ALCOHOL DO YOU HAVE ON A TYPICAL DAY: 0
HOW MANY STANDARD DRINKS CONTAINING ALCOHOL DO YOU HAVE ON A TYPICAL DAY: PATIENT DOES NOT DRINK
HOW OFTEN DO YOU HAVE SIX OR MORE DRINKS ON ONE OCCASION: 1
HOW OFTEN DO YOU HAVE A DRINK CONTAINING ALCOHOL: 1

## 2023-05-10 NOTE — PROGRESS NOTES
oriented to person, place, and time. Psychiatric:         Mood and Affect: Mood normal.         Behavior: Behavior normal.          PHQ Scores 5/10/2023 5/10/2023 1/18/2023 12/12/2022 9/6/2022 3/29/2022 11/10/2021   PHQ2 Score 2 2 2 0 0 - -   PHQ2 Score - - - - - 3 0   PHQ9 Score 9 2 2 0 0 - -      Diabetic foot exam:     Left Foot:   Visual Exam: normal    Pulse DP: 2+ (normal)   Filament test: normal sensation    Vibratory sensation: normal      Right Foot:   Visual Exam: normal    Pulse DP: 2+ (normal)   Filament test: normal sensation    Vibratory sensation: normal     Assessment & Plan:  1. Type 2 diabetes mellitus without complication, without long-term current use of insulin (HCC)  Continue diet and med  - CBC with Auto Differential; Future  - AMB POC HEMOGLOBIN A1C    2. Mixed hyperlipidemia  stable  - Comprehensive Metabolic Panel; Future  - Lipid Panel; Future  - CK; Future  - AMB POC URINE, MICROALBUMIN, SEMIQUANT (1 RESULT)    3. Gastroesophageal reflux disease without esophagitis  stable  - Magnesium; Future    4. Acute bacterial sinusitis  - cefdinir (OMNICEF) 300 MG capsule; Take 1 capsule by mouth 2 times daily for 7 days  Dispense: 14 capsule; Refill: 0    5. Cough  If no better after ATB completion, will do CXR      6. Encounter for immunization  - zoster recombinant adjuvanted vaccine [facility]) 50 MCG/0.5ML SUSR injection; Inject 0.5 mLs into the muscle See Admin Instructions for 1 day  Dispense: 0.5 mL; Refill: 1    7. Severe obesity (BMI 35.0-39. 9) with comorbidity ([address]  Diet and exercise encouraged. She goes to the [facility]. 8. Postmenopause  - DEXA BONE DENSITY AXIAL SKELETON; Future    9. Encounter for subsequent annual wellness visit (AWV) in Medicare patient     Greater than 50% counseling and/or coordination of care: the treatment regimen is extensive; detailed review. Will notify of labs. P&P next month. This note was dictated using dragon voice recognition software.   It has been

## 2023-05-11 LAB
ALBUMIN SERPL-MCNC: 3.9 G/DL (ref 3.2–4.6)
ALBUMIN/GLOB SERPL: 1.1 (ref 0.4–1.6)
ALP SERPL-CCNC: 133 U/L (ref 50–136)
ALT SERPL-CCNC: 29 U/L (ref 12–65)
ANION GAP SERPL CALC-SCNC: 7 MMOL/L (ref 2–11)
AST SERPL-CCNC: 25 U/L (ref 15–37)
BILIRUB SERPL-MCNC: 0.5 MG/DL (ref 0.2–1.1)
BUN SERPL-MCNC: 17 MG/DL (ref 8–23)
CALCIUM SERPL-MCNC: 9.3 MG/DL (ref 8.3–10.4)
CHLORIDE SERPL-SCNC: 107 MMOL/L (ref 101–110)
CHOLEST SERPL-MCNC: 160 MG/DL
CK SERPL-CCNC: 142 U/L (ref 21–215)
CO2 SERPL-SCNC: 25 MMOL/L (ref 21–32)
CREAT SERPL-MCNC: 0.7 MG/DL (ref 0.6–1)
GLOBULIN SER CALC-MCNC: 3.4 G/DL (ref 2.8–4.5)
GLUCOSE SERPL-MCNC: 129 MG/DL (ref 65–100)
HDLC SERPL-MCNC: 52 MG/DL (ref 40–60)
HDLC SERPL: 3.1
LDLC SERPL CALC-MCNC: 93.4 MG/DL
MAGNESIUM SERPL-MCNC: 2.3 MG/DL (ref 1.8–2.4)
POTASSIUM SERPL-SCNC: 4.4 MMOL/L (ref 3.5–5.1)
PROT SERPL-MCNC: 7.3 G/DL (ref 6.3–8.2)
SODIUM SERPL-SCNC: 139 MMOL/L (ref 133–143)
TRIGL SERPL-MCNC: 73 MG/DL (ref 35–150)
VLDLC SERPL CALC-MCNC: 14.6 MG/DL (ref 6–23)

## 2023-05-11 RX ORDER — ATORVASTATIN CALCIUM 10 MG/1
10 TABLET, FILM COATED ORAL DAILY
Qty: 90 TABLET | Refills: 1 | Status: SHIPPED | OUTPATIENT
Start: 2023-05-11 | End: 2023-07-12 | Stop reason: SDUPTHER

## 2023-05-11 RX ORDER — LISINOPRIL 10 MG/1
10 TABLET ORAL DAILY
Qty: 90 TABLET | Refills: 1 | Status: SHIPPED | OUTPATIENT
Start: 2023-05-11 | End: 2023-07-12 | Stop reason: SDUPTHER

## 2023-05-15 DIAGNOSIS — G56.22 CUBITAL TUNNEL SYNDROME ON LEFT: Primary | ICD-10-CM

## 2023-05-15 DIAGNOSIS — M18.12 ARTHRITIS OF CARPOMETACARPAL (CMC) JOINT OF LEFT THUMB: Primary | ICD-10-CM

## 2023-05-15 DIAGNOSIS — G56.02 LEFT CARPAL TUNNEL SYNDROME: ICD-10-CM

## 2023-05-15 DIAGNOSIS — M18.12 ARTHRITIS OF CARPOMETACARPAL (CMC) JOINT OF LEFT THUMB: ICD-10-CM

## 2023-05-16 RX ORDER — GABAPENTIN 100 MG/1
100 CAPSULE ORAL
COMMUNITY

## 2023-05-23 DIAGNOSIS — G56.22 CUBITAL TUNNEL SYNDROME ON LEFT: ICD-10-CM

## 2023-05-23 DIAGNOSIS — M18.12 ARTHRITIS OF CARPOMETACARPAL (CMC) JOINT OF LEFT THUMB: Primary | ICD-10-CM

## 2023-05-23 DIAGNOSIS — G56.02 LEFT CARPAL TUNNEL SYNDROME: ICD-10-CM

## 2023-05-24 ENCOUNTER — ANESTHESIA EVENT (OUTPATIENT)
Dept: SURGERY | Age: 63
End: 2023-05-24
Payer: MEDICARE

## 2023-05-24 RX ORDER — ONDANSETRON 2 MG/ML
4 INJECTION INTRAMUSCULAR; INTRAVENOUS
Status: CANCELLED | OUTPATIENT
Start: 2023-05-24 | End: 2023-05-25

## 2023-05-24 RX ORDER — PROCHLORPERAZINE EDISYLATE 5 MG/ML
5 INJECTION INTRAMUSCULAR; INTRAVENOUS
Status: CANCELLED | OUTPATIENT
Start: 2023-05-24 | End: 2023-05-25

## 2023-05-24 RX ORDER — HYDROMORPHONE HYDROCHLORIDE 2 MG/ML
0.5 INJECTION, SOLUTION INTRAMUSCULAR; INTRAVENOUS; SUBCUTANEOUS EVERY 5 MIN PRN
Status: CANCELLED | OUTPATIENT
Start: 2023-05-24

## 2023-05-24 NOTE — PROGRESS NOTES
Preop department called to notify patient of arrival time for scheduled procedure. Instructions given to   - Arrive at [address] - Remain NPO after midnight, unless otherwise indicated, including gum, mints, and ice chips. - Have a responsible adult to drive patient to the hospital, stay during surgery, and patient will need supervision 24 hours after anesthesia. - Use antibacterial soap in shower the night before surgery and on the morning of surgery.        Was patient contacted: voicemail  Voicemail left: yes  Numbers contacted: [phone]   Arrival time: 2850

## 2023-05-24 NOTE — PERIOP NOTE
Preop department called to notify patient of arrival time for scheduled procedure. Instructions given to   - Arrive at [address] - Remain NPO after midnight, unless otherwise indicated, including gum, mints, and ice chips. - Have a responsible adult to drive patient to the hospital, stay during surgery, and patient will need supervision 24 hours after anesthesia. - Use antibacterial soap in shower the night before surgery and on the morning of surgery.        Was patient contacted: pt  Voicemail left:   Numbers contacted: [phone]   Arrival time: 2327

## 2023-05-25 ENCOUNTER — ANESTHESIA (OUTPATIENT)
Dept: SURGERY | Age: 63
End: 2023-05-25
Payer: MEDICARE

## 2023-05-25 ENCOUNTER — HOSPITAL ENCOUNTER (OUTPATIENT)
Age: 63
Setting detail: OUTPATIENT SURGERY
Discharge: HOME | End: 2023-05-25
Attending: ORTHOPAEDIC SURGERY | Admitting: ORTHOPAEDIC SURGERY
Payer: MEDICARE

## 2023-05-25 ENCOUNTER — APPOINTMENT (OUTPATIENT)
Dept: GENERAL RADIOLOGY | Age: 63
End: 2023-05-25
Attending: ORTHOPAEDIC SURGERY
Payer: MEDICARE

## 2023-05-25 VITALS
SYSTOLIC BLOOD PRESSURE: 126 MMHG | OXYGEN SATURATION: 99 % | HEIGHT: 61 IN | DIASTOLIC BLOOD PRESSURE: 64 MMHG | TEMPERATURE: 97.3 F | WEIGHT: 188 LBS | HEART RATE: 63 BPM | BODY MASS INDEX: 35.5 KG/M2 | RESPIRATION RATE: 16 BRPM

## 2023-05-25 LAB
GLUCOSE BLD STRIP.AUTO-MCNC: 142 MG/DL (ref 65–100)
SERVICE CMNT-IMP: ABNORMAL

## 2023-05-25 PROCEDURE — 82962 GLUCOSE BLOOD TEST: CPT

## 2023-05-25 PROCEDURE — 6360000002 HC RX W HCPCS: Performed by: NURSE PRACTITIONER

## 2023-05-25 PROCEDURE — 7100000010 HC PHASE II RECOVERY - FIRST 15 MIN: Performed by: ORTHOPAEDIC SURGERY

## 2023-05-25 PROCEDURE — 2709999900 HC NON-CHARGEABLE SUPPLY: Performed by: ORTHOPAEDIC SURGERY

## 2023-05-25 PROCEDURE — 2720000010 HC SURG SUPPLY STERILE: Performed by: ORTHOPAEDIC SURGERY

## 2023-05-25 PROCEDURE — 64718 REVISE ULNAR NERVE AT ELBOW: CPT | Performed by: ORTHOPAEDIC SURGERY

## 2023-05-25 PROCEDURE — 2500000003 HC RX 250 WO HCPCS: Performed by: STUDENT IN AN ORGANIZED HEALTH CARE EDUCATION/TRAINING PROGRAM

## 2023-05-25 PROCEDURE — 3700000000 HC ANESTHESIA ATTENDED CARE: Performed by: ORTHOPAEDIC SURGERY

## 2023-05-25 PROCEDURE — 3600000013 HC SURGERY LEVEL 3 ADDTL 15MIN: Performed by: ORTHOPAEDIC SURGERY

## 2023-05-25 PROCEDURE — 7100000000 HC PACU RECOVERY - FIRST 15 MIN: Performed by: ORTHOPAEDIC SURGERY

## 2023-05-25 PROCEDURE — 25447 ARTHRP NTRCRP/CRP/MTCR NTRPS: CPT | Performed by: ORTHOPAEDIC SURGERY

## 2023-05-25 PROCEDURE — 7100000011 HC PHASE II RECOVERY - ADDTL 15 MIN: Performed by: ORTHOPAEDIC SURGERY

## 2023-05-25 PROCEDURE — 3600000003 HC SURGERY LEVEL 3 BASE: Performed by: ORTHOPAEDIC SURGERY

## 2023-05-25 PROCEDURE — 2500000003 HC RX 250 WO HCPCS: Performed by: NURSE ANESTHETIST, CERTIFIED REGISTERED

## 2023-05-25 PROCEDURE — 6360000002 HC RX W HCPCS: Performed by: STUDENT IN AN ORGANIZED HEALTH CARE EDUCATION/TRAINING PROGRAM

## 2023-05-25 PROCEDURE — 7100000001 HC PACU RECOVERY - ADDTL 15 MIN: Performed by: ORTHOPAEDIC SURGERY

## 2023-05-25 PROCEDURE — 2580000003 HC RX 258: Performed by: STUDENT IN AN ORGANIZED HEALTH CARE EDUCATION/TRAINING PROGRAM

## 2023-05-25 PROCEDURE — 76998 US GUIDE INTRAOP: CPT | Performed by: ORTHOPAEDIC SURGERY

## 2023-05-25 PROCEDURE — 64415 NJX AA&/STRD BRCH PLXS IMG: CPT | Performed by: STUDENT IN AN ORGANIZED HEALTH CARE EDUCATION/TRAINING PROGRAM

## 2023-05-25 PROCEDURE — 64721 CARPAL TUNNEL SURGERY: CPT | Performed by: ORTHOPAEDIC SURGERY

## 2023-05-25 PROCEDURE — 6360000002 HC RX W HCPCS: Performed by: NURSE ANESTHETIST, CERTIFIED REGISTERED

## 2023-05-25 PROCEDURE — 3700000001 HC ADD 15 MINUTES (ANESTHESIA): Performed by: ORTHOPAEDIC SURGERY

## 2023-05-25 PROCEDURE — 25310 TRANSPLANT FOREARM TENDON: CPT | Performed by: ORTHOPAEDIC SURGERY

## 2023-05-25 RX ORDER — LIDOCAINE HYDROCHLORIDE 10 MG/ML
1 INJECTION, SOLUTION INFILTRATION; PERINEURAL
Status: DISCONTINUED | OUTPATIENT
Start: 2023-05-25 | End: 2023-05-25 | Stop reason: HOSPADM

## 2023-05-25 RX ORDER — SODIUM CHLORIDE 0.9 % (FLUSH) 0.9 %
5-40 SYRINGE (ML) INJECTION EVERY 12 HOURS SCHEDULED
Status: DISCONTINUED | OUTPATIENT
Start: 2023-05-25 | End: 2023-05-25 | Stop reason: HOSPADM

## 2023-05-25 RX ORDER — KETAMINE HYDROCHLORIDE 50 MG/ML
INJECTION, SOLUTION, CONCENTRATE INTRAMUSCULAR; INTRAVENOUS PRN
Status: DISCONTINUED | OUTPATIENT
Start: 2023-05-25 | End: 2023-05-25 | Stop reason: SDUPTHER

## 2023-05-25 RX ORDER — FENTANYL CITRATE 50 UG/ML
100 INJECTION, SOLUTION INTRAMUSCULAR; INTRAVENOUS
Status: DISCONTINUED | OUTPATIENT
Start: 2023-05-25 | End: 2023-05-25 | Stop reason: HOSPADM

## 2023-05-25 RX ORDER — SODIUM CHLORIDE 9 MG/ML
INJECTION, SOLUTION INTRAVENOUS PRN
Status: DISCONTINUED | OUTPATIENT
Start: 2023-05-25 | End: 2023-05-25 | Stop reason: HOSPADM

## 2023-05-25 RX ORDER — SODIUM CHLORIDE 0.9 % (FLUSH) 0.9 %
5-40 SYRINGE (ML) INJECTION PRN
Status: DISCONTINUED | OUTPATIENT
Start: 2023-05-25 | End: 2023-05-25 | Stop reason: HOSPADM

## 2023-05-25 RX ORDER — ONDANSETRON 2 MG/ML
INJECTION INTRAMUSCULAR; INTRAVENOUS PRN
Status: DISCONTINUED | OUTPATIENT
Start: 2023-05-25 | End: 2023-05-25 | Stop reason: SDUPTHER

## 2023-05-25 RX ORDER — ONDANSETRON 4 MG/1
4 TABLET, FILM COATED ORAL EVERY 8 HOURS PRN
Qty: 20 TABLET | Refills: 0 | Status: SHIPPED | OUTPATIENT
Start: 2023-05-25

## 2023-05-25 RX ORDER — HYDROCODONE BITARTRATE AND ACETAMINOPHEN 7.5; 325 MG/1; MG/1
1 TABLET ORAL EVERY 4 HOURS PRN
Qty: 28 TABLET | Refills: 0 | Status: SHIPPED | OUTPATIENT
Start: 2023-05-25 | End: 2023-05-30

## 2023-05-25 RX ORDER — MIDAZOLAM HYDROCHLORIDE 2 MG/2ML
2 INJECTION, SOLUTION INTRAMUSCULAR; INTRAVENOUS
Status: COMPLETED | OUTPATIENT
Start: 2023-05-25 | End: 2023-05-25

## 2023-05-25 RX ORDER — LIDOCAINE HYDROCHLORIDE 20 MG/ML
INJECTION, SOLUTION EPIDURAL; INFILTRATION; INTRACAUDAL; PERINEURAL PRN
Status: DISCONTINUED | OUTPATIENT
Start: 2023-05-25 | End: 2023-05-25 | Stop reason: SDUPTHER

## 2023-05-25 RX ORDER — SODIUM CHLORIDE, SODIUM LACTATE, POTASSIUM CHLORIDE, CALCIUM CHLORIDE 600; 310; 30; 20 MG/100ML; MG/100ML; MG/100ML; MG/100ML
INJECTION, SOLUTION INTRAVENOUS CONTINUOUS
Status: DISCONTINUED | OUTPATIENT
Start: 2023-05-25 | End: 2023-05-25 | Stop reason: HOSPADM

## 2023-05-25 RX ORDER — PROPOFOL 10 MG/ML
INJECTION, EMULSION INTRAVENOUS PRN
Status: DISCONTINUED | OUTPATIENT
Start: 2023-05-25 | End: 2023-05-25 | Stop reason: SDUPTHER

## 2023-05-25 RX ORDER — BUPIVACAINE HYDROCHLORIDE AND EPINEPHRINE 5; 5 MG/ML; UG/ML
INJECTION, SOLUTION EPIDURAL; INTRACAUDAL; PERINEURAL
Status: COMPLETED | OUTPATIENT
Start: 2023-05-25 | End: 2023-05-25

## 2023-05-25 RX ADMIN — PROPOFOL 200 MCG/KG/MIN: 10 INJECTION, EMULSION INTRAVENOUS at 09:06

## 2023-05-25 RX ADMIN — Medication 2000 MG: at 09:09

## 2023-05-25 RX ADMIN — ONDANSETRON 4 MG: 2 INJECTION INTRAMUSCULAR; INTRAVENOUS at 09:11

## 2023-05-25 RX ADMIN — PROPOFOL 50 MG: 10 INJECTION, EMULSION INTRAVENOUS at 09:05

## 2023-05-25 RX ADMIN — SODIUM CHLORIDE, POTASSIUM CHLORIDE, SODIUM LACTATE AND CALCIUM CHLORIDE: 600; 310; 30; 20 INJECTION, SOLUTION INTRAVENOUS at 07:57

## 2023-05-25 RX ADMIN — KETAMINE HYDROCHLORIDE 10 MG: 50 INJECTION, SOLUTION INTRAMUSCULAR; INTRAVENOUS at 09:31

## 2023-05-25 RX ADMIN — BUPIVACAINE HYDROCHLORIDE AND EPINEPHRINE BITARTRATE 30 ML: 5; .005 INJECTION, SOLUTION EPIDURAL; INTRACAUDAL; PERINEURAL at 08:31

## 2023-05-25 RX ADMIN — MIDAZOLAM 2 MG: 1 INJECTION INTRAMUSCULAR; INTRAVENOUS at 08:31

## 2023-05-25 RX ADMIN — LIDOCAINE HYDROCHLORIDE 100 MG: 20 INJECTION, SOLUTION EPIDURAL; INFILTRATION; INTRACAUDAL; PERINEURAL at 09:05

## 2023-05-25 RX ADMIN — KETAMINE HYDROCHLORIDE 10 MG: 50 INJECTION, SOLUTION INTRAMUSCULAR; INTRAVENOUS at 09:19

## 2023-05-25 ASSESSMENT — PAIN - FUNCTIONAL ASSESSMENT: PAIN_FUNCTIONAL_ASSESSMENT: 0-10

## 2023-05-25 NOTE — ANESTHESIA POSTPROCEDURE EVALUATION
Department of Anesthesiology  Postprocedure Note    Patient: Valeria Guerrero  MRN: [id number]  YOB: 1960  Date of evaluation: 5/25/2023      Procedure Summary     Date: 05/25/23 Room / Location: [facility] OP OR 05 / [facility]    Anesthesia Start: 0901 Anesthesia Stop: 1013    Procedures:       left ultra sound carpal tunnel release (Left: Wrist)      left cubital tunnel release (Left: Elbow)      left thumb cmc arthroplasty (Left: Hand) Diagnosis:       Carpal tunnel syndrome on left      Cubital tunnel syndrome on left      Arthritis of carpometacarpal [place]) joint of left thumb      (Carpal tunnel syndrome on left [G56.02])      (Cubital tunnel syndrome on left [G56.22])      (Arthritis of carpometacarpal (CMC) joint of left thumb [M18.12])    Surgeons: Geryl Prader, MD Responsible Provider: Jasmin Rooney MD    Anesthesia Type: TIVA ASA Status: 2          Anesthesia Type: No value filed.     Wellington Phase I: Wellington Score: 8    Wellington Phase II: Wellington Score: 10      Anesthesia Post Evaluation    Patient location during evaluation: PACU  Patient participation: complete - patient participated  Level of consciousness: awake  Pain score: 0  Airway patency: patent  Nausea & Vomiting: no nausea and no vomiting  Complications: no  Cardiovascular status: blood pressure returned to baseline  Respiratory status: acceptable  Hydration status: euvolemic

## 2023-05-25 NOTE — OP NOTE
Hand Surgery Operative Note      Bernie Garcia   60 y.o.   female   5/25/2023     Pre-op diagnosis: Left  Left  Left Carpal Tunnel syndrome and Cubital Tunnel Syndrome, left thumb CMC arthritis  Post op diagnosis: same      Procedure: Left  Left  Left Ultrasound-Guided Carpal Tunnel and open Cubital  Tunnel release, left thumb trapeziectomy and suspension arthroplasty     Surgeon: Bee Hanson MD, PhD      Anesthesia: Regional block + MAC      Tourniquet time:   Total Tourniquet Time Documented:  Arm  (Left) - 32 minutes  Total: Arm  (Left) - 32 minutes        Procedure indications: Patient with radial and ulnar digit numbness recalcitrant to conservative measures with positive documentation of NCV findings consistent with carpal tunnel syndrome as well as symptoms and/or NCS findings of cubital tunnel syndrome. After Thorough discussion, the patient decided to proceed with surgical management. We discussed in detail surgical risks including scar, pain, bleeding, infection, anesthetic risks, neurovascular injury, need for further surgery,  weakness, stiffness, risk of death and potential risk of other unforseen complication. Procedure description:      Patient was placed in the supine position and after appropriate time-out and side, site and procedure confirmed. Using a sterile ultrasound cover and sterile gel, relevant anatomical landmarks were identified, including but not limited to the median nerve, thenar motor branch/recurrent motor branch of the median nerve, palmar cutaneous branch of the median nerve, median and ulnar digital nerves and any visualized communications, ulnar vessels and superficial palmar arch, palmar fascia and distal transverse carpal ligament. A sterile ink marker was used to viktor the borders of the transverse and longitudinal safe zones as well as the incision site in the proximal carpal tunnel region.  The safe zones were re-scanned to ensure acceptable anatomy

## 2023-05-25 NOTE — INTERVAL H&P NOTE
H&P Update:  Yemi Johnson was seen and examined. History and physical has been reviewed. The patient has been examined.  There have been no significant clinical changes since the completion of the originally dated History and Physical.    Mirtha Hedrick MD  Orthopaedic Surgery  05/25/23  7:50 AM

## 2023-05-25 NOTE — ANESTHESIA PRE PROCEDURE
Department of Anesthesiology  Preprocedure Note       Name:  Collin Santos   Age:  60 y.o.  :  1960                                          MRN:  [id number]         Date:  2023      Surgeon: Edgar Lane):  Abram Lopez MD    Procedure: Procedure(s):  left ultra sound carpal tunnel release  left cubital tunnel release  left thumb cmc arthroplasty    Medications prior to admission:   Prior to Admission medications    Medication Sig Start Date End Date Taking? Authorizing Provider   gabapentin (NEURONTIN) 100 MG capsule Take 1 capsule by mouth nightly. Yes Historical Provider, MD   atorvastatin (LIPITOR) 10 MG tablet Take 1 tablet by mouth daily  Patient taking differently: Take 1 tablet by mouth nightly 23   MARIA EUGENIA Dooley CNP   lisinopril (PRINIVIL;ZESTRIL) 10 MG tablet Take 1 tablet by mouth daily  Patient taking differently: Take 1 tablet by mouth nightly 23   MARIA EUGENIA Dooley CNP   citalopram (CELEXA) 20 MG tablet Take 1  1/2 po daily  Patient taking differently: nightly Take 1  1/2 po daily 23   MARIA EUGENIA Dooley CNP   clotrimazole-betamethasone (LOTRISONE) 1-0.05 % cream Apply topically 2 times daily.  23   MARIA EUGENIA Dooley CNP   glipiZIDE (GLUCOTROL XL) 2.5 MG extended release tablet Take 1 tablet by mouth daily  Patient taking differently: Take 1 tablet by mouth in the morning and at bedtime 23   MARIA EUGENIA Dooley CNP   montelukast (SINGULAIR) 10 MG tablet Take 1 tablet by mouth daily  Patient taking differently: Take 1 tablet by mouth nightly 23   MARIA EUGENIA Dooley CNP   omeprazole (PRILOSEC) 20 MG delayed release capsule Take 1 capsule by mouth daily 23   MARIA EUGENIA Dooley CNP   ondansetron (ZOFRAN) 4 MG tablet Take 1 tablet by mouth every 8 hours as needed for Nausea  Patient not taking: Reported on 2023   MARIA EUGENIA Dooley CNP   topiramate

## 2023-05-25 NOTE — PERIOP NOTE
PACU DISCHARGE NOTE    Pt and friend verbalized understanding of discharge inst. Vital signs stable, pain well controlled, alert and oriented times three or at baseline, follow up per surgeon, no anesthetic complications.

## 2023-05-25 NOTE — ANESTHESIA PROCEDURE NOTES
Peripheral Block    Patient location during procedure: pre-op  Reason for block: post-op pain management and at surgeon's request  Start time: 5/25/2023 8:31 AM  End time: 5/25/2023 8:37 AM  Staffing  Performed: anesthesiologist   Anesthesiologist: Nayana Martinez MD  Preanesthetic Checklist  Completed: patient identified, site marked, risks and benefits discussed, equipment checked, pre-op evaluation, timeout performed, anesthesia consent given, oxygen available and monitors applied/VS acknowledged  Peripheral Block   Prep: ChloraPrep  Provider prep: mask and sterile gloves  Patient monitoring: cardiac monitor, continuous pulse ox, oxygen, IV access, frequent blood pressure checks and responsive to questions  Block type: Brachial plexus  Laterality: left  Injection technique: single-shot  Guidance: ultrasound guided  Local infiltration: lidocaine  Infiltration strength: 1 %  Local infiltration: lidocaine  Dose: 3 mL    Needle   Needle type: insulated echogenic nerve stimulator needle   Needle gauge: 20 G  Needle localization: ultrasound guidance (minimal motor response at >0.4 mA)  Needle length: 10 cm  Assessment   Injection assessment: negative aspiration for heme, no paresthesia on injection, local visualized surrounding nerve on ultrasound and no intravascular symptoms  Paresthesia pain: none  Slow fractionated injection: yes  Hemodynamics: stable  Real-time US image taken/store: yes  Outcomes: patient tolerated procedure well and uncomplicated    Additional Notes  Risks/benefits/alternatives discussed including damage to nerve or muscle. 3 cc 1% lidocaine local injected at needle insertion site. Needle inserted and placed in close proximity to the nerve under real time ultrasound guidance. Ultrasound was used to visualize the spread of local anesthetic in close proximity to the nerve being blocked. The nerve appeared anatomically normal and there were no abnormal findings.   Ultrasound images printed and 1

## 2023-06-06 ENCOUNTER — OFFICE VISIT (OUTPATIENT)
Dept: ORTHOPEDIC SURGERY | Age: 63
End: 2023-06-06

## 2023-06-06 ENCOUNTER — TELEPHONE (OUTPATIENT)
Dept: ORTHOPEDIC SURGERY | Age: 63
End: 2023-06-06

## 2023-06-06 DIAGNOSIS — G56.22 CUBITAL TUNNEL SYNDROME ON LEFT: ICD-10-CM

## 2023-06-06 DIAGNOSIS — G56.02 LEFT CARPAL TUNNEL SYNDROME: ICD-10-CM

## 2023-06-06 DIAGNOSIS — M18.12 ARTHRITIS OF CARPOMETACARPAL (CMC) JOINT OF LEFT THUMB: Primary | ICD-10-CM

## 2023-06-06 PROCEDURE — 99024 POSTOP FOLLOW-UP VISIT: CPT | Performed by: ORTHOPAEDIC SURGERY

## 2023-06-06 NOTE — TELEPHONE ENCOUNTER
Called the patient and she will come in tomorrow for a cast change if she is still having numbness in her thumb

## 2023-06-06 NOTE — PROGRESS NOTES
Orthopaedic Hand Surgery Note    Name: Laya Lama  Age: 60 y.o. YOB: 1960  Gender: female  MRN: [id number]    HPI: Patient is status post left ultra sound carpal tunnel release - Left, left cubital tunnel release - Left, and left thumb cmc arthroplasty - Left on 5/25/2023. Patient reports mild pain. No numbness, tingling, fevers or chills. Physical Examination:  Wound healing well. Good finger range of motion. Sensation is intact to light touch in all digits. Mild swelling in the operative wrist. Posture of the thumb is excellent. Negative CMC grind for pain or crepitus    Assessment:   1. Arthritis of carpometacarpal (CMC) joint of left thumb    2. Cubital tunnel syndrome on left    3. Left carpal tunnel syndrome        Status post left ultra sound carpal tunnel release - Left, left cubital tunnel release - Left, and left thumb cmc arthroplasty - Left on 5/25/2023    Plan:  We discussed the treatment and therapy plan. We will place the patient in a cast until 4 weeks postop. We will continue brace after that until 8 weeks post-op. Therapy will focus on motion, edema control, custom bracing and scar management and progress into light strengthening at 6-8 weeks post-op depending on progress. We once again discussed the long recovery and need for protection. Patient will return in 2 weeks for cast removal and re-evaluation.   She has global tenderness on the wrist joint, DRUJ, radiocarpal, intercarpal joints, somewhat nonspecific, if in 2 weeks after coming out of the cast she continues to have severe pain in this area we will get an x-ray    Meagan Matute MD  Orthopaedic Surgery  06/06/23  8:40 AM

## 2023-06-07 ENCOUNTER — OFFICE VISIT (OUTPATIENT)
Dept: ORTHOPEDIC SURGERY | Age: 63
End: 2023-06-07
Payer: MEDICARE

## 2023-06-07 DIAGNOSIS — G56.02 LEFT CARPAL TUNNEL SYNDROME: ICD-10-CM

## 2023-06-07 DIAGNOSIS — M18.12 ARTHRITIS OF CARPOMETACARPAL (CMC) JOINT OF LEFT THUMB: Primary | ICD-10-CM

## 2023-06-07 DIAGNOSIS — G56.22 CUBITAL TUNNEL SYNDROME ON LEFT: ICD-10-CM

## 2023-06-07 PROCEDURE — 99024 POSTOP FOLLOW-UP VISIT: CPT | Performed by: ORTHOPAEDIC SURGERY

## 2023-06-07 PROCEDURE — L3984 UPPER EXT FX ORTHOSIS WRIST: HCPCS | Performed by: ORTHOPAEDIC SURGERY

## 2023-06-07 NOTE — PROGRESS NOTES
The patient was prescribed and fitted with an EXOS Thumb spica brace for the left hand, size small. Patient read and signed documenting they understand and agree to [facility]'s current DME return policy.

## 2023-06-07 NOTE — PROGRESS NOTES
Orthopaedic Hand Surgery Note    Name: Chel Ramírez  Age: 60 y.o. YOB: 1960  Gender: female  MRN: [id number]    Post Operative Visit: left ultra sound carpal tunnel release - Left, left cubital tunnel release - Left, and left thumb cmc arthroplasty - Left    HPI: Patient is status post left ultra sound carpal tunnel release - Left, left cubital tunnel release - Left, and left thumb cmc arthroplasty - Left on 5/25/2023. Patient reports the cast is giving her numbness in the thumb and she feels like it is too tight. Physical Examination:  Well-healed surgical wounds. Sensation is intact in all fingers. Motor exam reveals no deficits. .    Imaging:     none we remove the cast, we will give her an Exos removable cast for better comfort and avoid    Assessment:   1. Arthritis of carpometacarpal (CMC) joint of left thumb    2. Cubital tunnel syndrome on left    3. Left carpal tunnel syndrome         Status post left ultra sound carpal tunnel release - Left, left cubital tunnel release - Left, and left thumb cmc arthroplasty - Left on 5/25/2023    Plan:  We discussed the post operative course and progression.   we removed the cast, we will give her an Exos removable cast for better comfort and avoid further nerve issues    Marni Toledo MD  06/07/23  10:21 AM

## 2023-06-20 ENCOUNTER — OFFICE VISIT (OUTPATIENT)
Dept: ORTHOPEDIC SURGERY | Age: 63
End: 2023-06-20

## 2023-06-20 ENCOUNTER — EVALUATION (OUTPATIENT)
Age: 63
End: 2023-06-20
Payer: MEDICARE

## 2023-06-20 DIAGNOSIS — G56.02 LEFT CARPAL TUNNEL SYNDROME: ICD-10-CM

## 2023-06-20 DIAGNOSIS — G56.22 CUBITAL TUNNEL SYNDROME ON LEFT: ICD-10-CM

## 2023-06-20 DIAGNOSIS — S62.102A CLOSED FRACTURE OF LEFT WRIST, INITIAL ENCOUNTER: ICD-10-CM

## 2023-06-20 DIAGNOSIS — M25.532 LEFT WRIST PAIN: ICD-10-CM

## 2023-06-20 DIAGNOSIS — R20.0 NUMBNESS AND TINGLING: ICD-10-CM

## 2023-06-20 DIAGNOSIS — R20.2 NUMBNESS AND TINGLING: ICD-10-CM

## 2023-06-20 DIAGNOSIS — M79.642 PAIN OF LEFT HAND: Primary | ICD-10-CM

## 2023-06-20 DIAGNOSIS — M18.12 ARTHRITIS OF CARPOMETACARPAL (CMC) JOINT OF LEFT THUMB: ICD-10-CM

## 2023-06-20 DIAGNOSIS — M18.12 ARTHRITIS OF CARPOMETACARPAL (CMC) JOINT OF LEFT THUMB: Primary | ICD-10-CM

## 2023-06-20 PROCEDURE — 97165 OT EVAL LOW COMPLEX 30 MIN: CPT | Performed by: OCCUPATIONAL THERAPIST

## 2023-06-20 PROCEDURE — 99024 POSTOP FOLLOW-UP VISIT: CPT | Performed by: ORTHOPAEDIC SURGERY

## 2023-06-20 PROCEDURE — 97110 THERAPEUTIC EXERCISES: CPT | Performed by: OCCUPATIONAL THERAPIST

## 2023-06-20 NOTE — PROGRESS NOTES
[facility]  [address] [facility]  [address] [address]  Dept: [phone]      Occupational Therapy Initial Assessment     Referring MD: Priscilla Ramirez MD    Diagnosis:     ICD-10-CM    1. Pain of left hand  M79.642       2. Cubital tunnel syndrome on left  G56.22       3. Arthritis of carpometacarpal (CMC) joint of left thumb  M18.12       4. Left wrist pain  M25.532       5. Numbness and tingling  R20.0     R20.2            Surgery: Date 5/25/23:   Left Ultrasound-Guided Carpal Tunnel and open Cubital  Tunnel release, left thumb trapeziectomy and suspension arthroplasty    Therapy precautions: Joint arthroplasty  Avoid lateral pinch  Avoid thumb adduction  Avoid wide radial abduction  No strengthening until post-op week 6  Facilitate neuromuscular re-education/maintaining \"C\" shape    History of injury/onset : gradually worsening CMC OA and pain. Fracture to radius in November exaccerbating carpa tuunnel and cubital tunnel presentation. Post op bulky dressing was removed and she was placed in a cast.  However, due to swelling and compression in her cast she was then placed in Exos thumb spica. She decided she would like to continue using this versus having custom orthosis fabricated this date.     Total Direct Treatment Time: 15 min                       Total In Office Time: 35 min  Modifier needed: No  Episode visit count:  1     Preferred Name:  Susan Escobedo HISTORY     PMHX & Meds:   Past Medical History:   Diagnosis Date    Arthritis     back, shoulders, hand    Asthma     allergy triggered    Depression     anxiety    Diabetes ([address]     oral agent, does not check blood sugar, last A1C 6.8    GERD (gastroesophageal reflux disease)     medications prn    History of degenerative disc disease     Hyperlipidemia     Hypertension    ,   Past Surgical History:   Procedure Laterality Date    ARM SURGERY Left 5/25/2023    left cubital tunnel release performed by [facility]

## 2023-06-20 NOTE — PROGRESS NOTES
Orthopaedic Hand Surgery Note    Name: Lee Franco  Age: 60 y.o. YOB: 1960  Gender: female  MRN: [id number]    Post Operative Visit: left ultra sound carpal tunnel release - Left, left cubital tunnel release - Left, and left thumb cmc arthroplasty - Left    HPI: Patient is status post left ultra sound carpal tunnel release - Left, left cubital tunnel release - Left, and left thumb cmc arthroplasty - Left on 5/25/2023. Patient reports doing well, intermittent pain with a highest pain level of 8 out of 10 when she hits it, continued numbness on the dorsal aspect of the thumb, she is taking Norco as needed for pain which is her chronic prescription. Physical Examination:  Well-healed surgical wounds. Sensation is intact in all fingers. Motor exam reveals no deficits. Imaging:     none    Assessment:   1. Arthritis of carpometacarpal (CMC) joint of left thumb    2. Cubital tunnel syndrome on left    3. Left carpal tunnel syndrome    4. Closed fracture of left wrist, initial encounter         Status post left ultra sound carpal tunnel release - Left, left cubital tunnel release - Left, and left thumb cmc arthroplasty - Left on 5/25/2023    Plan:  We discussed the post operative course and progression.   Therapy referral to work on motion through the [facility] arthroplasty protocol, I will reassess the patient in 4 weeks    Giovani Hancock MD  06/20/23  9:21 AM

## 2023-06-30 ENCOUNTER — TREATMENT (OUTPATIENT)
Age: 63
End: 2023-06-30

## 2023-06-30 DIAGNOSIS — M62.81 HAND MUSCLE WEAKNESS: ICD-10-CM

## 2023-06-30 DIAGNOSIS — R20.2 NUMBNESS AND TINGLING: ICD-10-CM

## 2023-06-30 DIAGNOSIS — M79.642 PAIN OF LEFT HAND: Primary | ICD-10-CM

## 2023-06-30 DIAGNOSIS — R20.0 NUMBNESS AND TINGLING: ICD-10-CM

## 2023-06-30 DIAGNOSIS — M25.532 LEFT WRIST PAIN: ICD-10-CM

## 2023-07-03 NOTE — PROGRESS NOTES
[address] [place] [address] [address] [facility]  [address] [facility]  Dept: [phone]      Occupational Therapy Daily Note     Referring MD: Braden Gilmore MD    Diagnosis:     ICD-10-CM    1. Pain of left hand  M79.642       2. Left wrist pain  M25.532       3. Numbness and tingling  R20.0     R20.2       4. Hand muscle weakness  M62.81            Surgery: Date 5/25/23:   Left Ultrasound-Guided Carpal Tunnel and open Cubital  Tunnel release, left thumb trapeziectomy and suspension arthroplasty    Therapy precautions: Joint arthroplasty  Avoid lateral pinch  Avoid thumb adduction  Avoid wide radial abduction  No strengthening until post-op week 6  Facilitate neuromuscular re-education/maintaining \"C\" shape    History of injury/onset : gradually worsening CMC OA and pain. Fracture to radius in November exaccerbating carpa tuunnel and cubital tunnel presentation. Post op bulky dressing was removed and she was placed in a cast.  However, due to swelling and compression in her cast she was then placed in Exos thumb spica. She decided she would like to continue using this versus having custom orthosis fabricated this date.     Total Direct Treatment Time: 45 min                       Total In Office Time: 45 min  Modifier needed: No  Episode visit count:  3     Preferred Name:  Harsh Rank HISTORY     PMHX & Meds:   Past Medical History:   Diagnosis Date    Arthritis     back, shoulders, hand    Asthma     allergy triggered    Depression     anxiety    Diabetes ([address])     oral agent, does not check blood sugar, last A1C 6.8    GERD (gastroesophageal reflux disease)     medications prn    History of degenerative disc disease     Hyperlipidemia     Hypertension    ,   Past Surgical History:   Procedure Laterality Date    ARM SURGERY Left 5/25/2023    left cubital tunnel release performed by Braden Gilmore MD at [address] [place] [address]  04/26/2021    Right hand    CARPAL TUNNEL

## 2023-07-05 ENCOUNTER — TREATMENT (OUTPATIENT)
Age: 63
End: 2023-07-05
Payer: MEDICARE

## 2023-07-05 DIAGNOSIS — M62.81 HAND MUSCLE WEAKNESS: ICD-10-CM

## 2023-07-05 DIAGNOSIS — R20.2 NUMBNESS AND TINGLING: ICD-10-CM

## 2023-07-05 DIAGNOSIS — M25.532 LEFT WRIST PAIN: ICD-10-CM

## 2023-07-05 DIAGNOSIS — M79.642 PAIN OF LEFT HAND: Primary | ICD-10-CM

## 2023-07-05 DIAGNOSIS — R20.0 NUMBNESS AND TINGLING: ICD-10-CM

## 2023-07-05 PROCEDURE — 97110 THERAPEUTIC EXERCISES: CPT | Performed by: OCCUPATIONAL THERAPIST

## 2023-07-05 PROCEDURE — 97022 WHIRLPOOL THERAPY: CPT | Performed by: OCCUPATIONAL THERAPIST

## 2023-07-05 PROCEDURE — 97140 MANUAL THERAPY 1/> REGIONS: CPT | Performed by: OCCUPATIONAL THERAPIST

## 2023-07-12 ENCOUNTER — OFFICE VISIT (OUTPATIENT)
Dept: FAMILY MEDICINE CLINIC | Facility: CLINIC | Age: 63
End: 2023-07-12
Payer: MEDICARE

## 2023-07-12 VITALS
DIASTOLIC BLOOD PRESSURE: 74 MMHG | HEIGHT: 61 IN | TEMPERATURE: 98.4 F | SYSTOLIC BLOOD PRESSURE: 148 MMHG | BODY MASS INDEX: 35.95 KG/M2 | HEART RATE: 61 BPM | WEIGHT: 190.4 LBS | OXYGEN SATURATION: 98 %

## 2023-07-12 DIAGNOSIS — I10 PRIMARY HYPERTENSION: ICD-10-CM

## 2023-07-12 DIAGNOSIS — F33.0 MAJOR DEPRESSIVE DISORDER, RECURRENT, MILD (HCC): ICD-10-CM

## 2023-07-12 DIAGNOSIS — J45.909 MILD REACTIVE AIRWAYS DISEASE, UNSPECIFIED WHETHER PERSISTENT: ICD-10-CM

## 2023-07-12 DIAGNOSIS — E11.9 TYPE 2 DIABETES MELLITUS WITHOUT COMPLICATION, WITHOUT LONG-TERM CURRENT USE OF INSULIN (HCC): ICD-10-CM

## 2023-07-12 DIAGNOSIS — Z01.419 NORMAL GYNECOLOGIC EXAMINATION: Primary | ICD-10-CM

## 2023-07-12 DIAGNOSIS — R51.9 NONINTRACTABLE HEADACHE, UNSPECIFIED CHRONICITY PATTERN, UNSPECIFIED HEADACHE TYPE: ICD-10-CM

## 2023-07-12 DIAGNOSIS — E78.2 MIXED HYPERLIPIDEMIA: ICD-10-CM

## 2023-07-12 DIAGNOSIS — Z78.0 POSTMENOPAUSE: ICD-10-CM

## 2023-07-12 DIAGNOSIS — Z01.419 NORMAL GYNECOLOGIC EXAMINATION: ICD-10-CM

## 2023-07-12 DIAGNOSIS — K21.9 GASTROESOPHAGEAL REFLUX DISEASE WITHOUT ESOPHAGITIS: ICD-10-CM

## 2023-07-12 PROCEDURE — 3077F SYST BP >= 140 MM HG: CPT | Performed by: NURSE PRACTITIONER

## 2023-07-12 PROCEDURE — 3046F HEMOGLOBIN A1C LEVEL >9.0%: CPT | Performed by: NURSE PRACTITIONER

## 2023-07-12 PROCEDURE — 3017F COLORECTAL CA SCREEN DOC REV: CPT | Performed by: NURSE PRACTITIONER

## 2023-07-12 PROCEDURE — G8427 DOCREV CUR MEDS BY ELIG CLIN: HCPCS | Performed by: NURSE PRACTITIONER

## 2023-07-12 PROCEDURE — G8417 CALC BMI ABV UP PARAM F/U: HCPCS | Performed by: NURSE PRACTITIONER

## 2023-07-12 PROCEDURE — 99213 OFFICE O/P EST LOW 20 MIN: CPT | Performed by: NURSE PRACTITIONER

## 2023-07-12 PROCEDURE — 2022F DILAT RTA XM EVC RTNOPTHY: CPT | Performed by: NURSE PRACTITIONER

## 2023-07-12 PROCEDURE — 1036F TOBACCO NON-USER: CPT | Performed by: NURSE PRACTITIONER

## 2023-07-12 PROCEDURE — 3078F DIAST BP <80 MM HG: CPT | Performed by: NURSE PRACTITIONER

## 2023-07-12 RX ORDER — CLOTRIMAZOLE AND BETAMETHASONE DIPROPIONATE 10; .64 MG/G; MG/G
CREAM TOPICAL
Qty: 30 G | Refills: 0 | Status: SHIPPED | OUTPATIENT
Start: 2023-07-12

## 2023-07-12 RX ORDER — TOPIRAMATE 25 MG/1
25 TABLET ORAL DAILY
Qty: 90 TABLET | Refills: 0 | Status: SHIPPED | OUTPATIENT
Start: 2023-07-12

## 2023-07-12 RX ORDER — LISINOPRIL 10 MG/1
10 TABLET ORAL
Qty: 90 TABLET | Refills: 1 | Status: SHIPPED | OUTPATIENT
Start: 2023-07-12

## 2023-07-12 RX ORDER — OMEPRAZOLE 20 MG/1
20 CAPSULE, DELAYED RELEASE ORAL DAILY
Qty: 90 CAPSULE | Refills: 1 | Status: SHIPPED | OUTPATIENT
Start: 2023-07-12

## 2023-07-12 RX ORDER — ATORVASTATIN CALCIUM 10 MG/1
10 TABLET, FILM COATED ORAL DAILY
Qty: 90 TABLET | Refills: 1 | Status: SHIPPED | OUTPATIENT
Start: 2023-07-12

## 2023-07-12 RX ORDER — MONTELUKAST SODIUM 10 MG/1
10 TABLET ORAL NIGHTLY
Qty: 90 TABLET | Refills: 1 | Status: SHIPPED | OUTPATIENT
Start: 2023-07-12

## 2023-07-12 RX ORDER — CITALOPRAM 20 MG/1
20 TABLET ORAL
Qty: 135 TABLET | Refills: 1 | Status: SHIPPED | OUTPATIENT
Start: 2023-07-12

## 2023-07-12 RX ORDER — GLIPIZIDE 2.5 MG/1
2.5 TABLET, EXTENDED RELEASE ORAL 2 TIMES DAILY
Qty: 180 TABLET | Refills: 1 | Status: SHIPPED | OUTPATIENT
Start: 2023-07-12

## 2023-07-12 ASSESSMENT — ENCOUNTER SYMPTOMS
ALLERGIC/IMMUNOLOGIC NEGATIVE: 1
RESPIRATORY NEGATIVE: 1
GASTROINTESTINAL NEGATIVE: 1
EYES NEGATIVE: 1

## 2023-07-17 NOTE — PROGRESS NOTES
hand/thumb. Pts Quick DASH functional assessment score will be less than 20% functional deficit. [employer] [facility]   Access Code: [email or web address]  URL: [email or web address]  Date: 06/20/2023  Prepared by: Tato Rear    Exercises  - Wrist Flexion Extension AROM with Fingers Curled and Palm Down  - 5 x daily - 7 x weekly - 2 sets - 15 reps  - Seated Forearm Pronation Supination AROM  - 5 x daily - 7 x weekly - 2 sets - 15 reps - 3 sec hold  - Wrist AROM Radial and Ulnar deviation  - 5 x daily - 7 x weekly - 2 sets - 15 reps  - Seated Composite Thumb Flexion AROM  - 5 x daily - 7 x weekly - 2-3 sets - 15 reps - 3 sec hold  - Median Nerve Flossing - Tray  - 2-3 x daily - 7 x weekly - 1-2 sets - 10 reps - 3 hold  - Ulnar Nerve Flossing  - 2-3 x daily - 7 x weekly - 1-2 sets - 10 reps - 3 hold  OT Protocols

## 2023-07-18 ENCOUNTER — TELEPHONE (OUTPATIENT)
Dept: FAMILY MEDICINE CLINIC | Facility: CLINIC | Age: 63
End: 2023-07-18

## 2023-07-18 LAB
CYTOLOGIST CVX/VAG CYTO: NORMAL
CYTOLOGY CVX/VAG DOC THIN PREP: NORMAL
HPV REFLEX: NORMAL
Lab: NORMAL
PATH REPORT.FINAL DX SPEC: NORMAL
STAT OF ADQ CVX/VAG CYTO-IMP: NORMAL

## 2023-07-18 NOTE — TELEPHONE ENCOUNTER
Faxed to [facility] DME order for [place] [address] to ([phone]) with office notes dated 5-. Confirmation received and sent to scanning.

## 2023-07-18 NOTE — TELEPHONE ENCOUNTER
Pt called and said that pharmacy did not get rx for the Lotrisone cream. Can you call them and see? It is in chart from 07/12/23. Please let pt know.   Thanks

## 2023-07-19 ENCOUNTER — TREATMENT (OUTPATIENT)
Age: 63
End: 2023-07-19

## 2023-07-19 ENCOUNTER — TELEPHONE (OUTPATIENT)
Dept: FAMILY MEDICINE CLINIC | Facility: CLINIC | Age: 63
End: 2023-07-19

## 2023-07-19 DIAGNOSIS — M25.532 LEFT WRIST PAIN: ICD-10-CM

## 2023-07-19 DIAGNOSIS — R20.2 NUMBNESS AND TINGLING: ICD-10-CM

## 2023-07-19 DIAGNOSIS — M79.642 PAIN OF LEFT HAND: Primary | ICD-10-CM

## 2023-07-19 DIAGNOSIS — M62.81 HAND MUSCLE WEAKNESS: ICD-10-CM

## 2023-07-19 DIAGNOSIS — R20.0 NUMBNESS AND TINGLING: ICD-10-CM

## 2023-07-20 NOTE — TELEPHONE ENCOUNTER
Pt calling back to see what happened to cream that we sent to [address] in [place] on 07/13/23. Did you have time to call pharmacy and find out?   Thanks

## 2023-07-20 NOTE — TELEPHONE ENCOUNTER
Spoke with pt, it was not the cream, but for the CGM pt had a question on. Sent pt [employer] # through [employer].

## 2023-07-25 ENCOUNTER — OFFICE VISIT (OUTPATIENT)
Dept: ORTHOPEDIC SURGERY | Age: 63
End: 2023-07-25

## 2023-07-25 ENCOUNTER — TREATMENT (OUTPATIENT)
Age: 63
End: 2023-07-25
Payer: MEDICARE

## 2023-07-25 DIAGNOSIS — M25.532 LEFT WRIST PAIN: ICD-10-CM

## 2023-07-25 DIAGNOSIS — R20.0 NUMBNESS AND TINGLING: ICD-10-CM

## 2023-07-25 DIAGNOSIS — M79.642 PAIN OF LEFT HAND: Primary | ICD-10-CM

## 2023-07-25 DIAGNOSIS — M62.81 HAND MUSCLE WEAKNESS: ICD-10-CM

## 2023-07-25 DIAGNOSIS — G56.02 LEFT CARPAL TUNNEL SYNDROME: ICD-10-CM

## 2023-07-25 DIAGNOSIS — G56.22 CUBITAL TUNNEL SYNDROME ON LEFT: ICD-10-CM

## 2023-07-25 DIAGNOSIS — M18.12 ARTHRITIS OF CARPOMETACARPAL (CMC) JOINT OF LEFT THUMB: Primary | ICD-10-CM

## 2023-07-25 DIAGNOSIS — R20.2 NUMBNESS AND TINGLING: ICD-10-CM

## 2023-07-25 PROCEDURE — 97110 THERAPEUTIC EXERCISES: CPT | Performed by: OCCUPATIONAL THERAPIST

## 2023-07-25 PROCEDURE — 99024 POSTOP FOLLOW-UP VISIT: CPT | Performed by: ORTHOPAEDIC SURGERY

## 2023-07-25 PROCEDURE — 97140 MANUAL THERAPY 1/> REGIONS: CPT | Performed by: OCCUPATIONAL THERAPIST

## 2023-07-25 PROCEDURE — 97022 WHIRLPOOL THERAPY: CPT | Performed by: OCCUPATIONAL THERAPIST

## 2023-07-25 NOTE — PROGRESS NOTES
Orthopaedic Hand Surgery Note    Name: Michael Jimenez  Age: 60 y.o. YOB: 1960  Gender: female  MRN: [id number]    Post Operative Visit: left ultra sound carpal tunnel release - Left, left cubital tunnel release - Left, and left thumb cmc arthroplasty - Left    HPI: Patient is status post left ultra sound carpal tunnel release - Left, left cubital tunnel release - Left, and left thumb cmc arthroplasty - Left on 5/25/2023. Patient reports no numbness, occasional pain at the thumb [facility] joint but she is at least 60% better than before surgery, she will start strengthening now. Physical Examination:  Well-healed surgical wounds. Sensation is intact in all fingers. Motor exam reveals no deficits. Composite thumb motion 8 out of 10. Imaging:     none    Assessment:   1. Arthritis of carpometacarpal (CMC) joint of left thumb    2. Cubital tunnel syndrome on left    3. Left carpal tunnel syndrome         Status post left ultra sound carpal tunnel release - Left, left cubital tunnel release - Left, and left thumb cmc arthroplasty - Left on 5/25/2023    Plan:  We discussed the post operative course and progression.   Continue strengthening, I will reassess the patient in 2 months for final assessment, she is always welcome to cancel if she is feeling well and has no issues    Ariana Johnston MD  07/25/23  10:13 AM

## 2023-08-22 ENCOUNTER — OFFICE VISIT (OUTPATIENT)
Dept: ORTHOPEDIC SURGERY | Age: 63
End: 2023-08-22

## 2023-08-22 DIAGNOSIS — G56.22 CUBITAL TUNNEL SYNDROME ON LEFT: ICD-10-CM

## 2023-08-22 DIAGNOSIS — M18.12 ARTHRITIS OF CARPOMETACARPAL (CMC) JOINT OF LEFT THUMB: ICD-10-CM

## 2023-08-22 DIAGNOSIS — G56.02 LEFT CARPAL TUNNEL SYNDROME: Primary | ICD-10-CM

## 2023-08-22 RX ORDER — MELOXICAM 7.5 MG/1
7.5 TABLET ORAL DAILY
Qty: 60 TABLET | Refills: 0 | Status: SHIPPED | OUTPATIENT
Start: 2023-08-22 | End: 2023-10-21

## 2023-08-22 RX ORDER — BETAMETHASONE SODIUM PHOSPHATE AND BETAMETHASONE ACETATE 3; 3 MG/ML; MG/ML
6 INJECTION, SUSPENSION INTRA-ARTICULAR; INTRALESIONAL; INTRAMUSCULAR; SOFT TISSUE ONCE
Status: SHIPPED | OUTPATIENT
Start: 2023-08-22

## 2023-08-22 NOTE — PROGRESS NOTES
Orthopaedic Hand Surgery Note    Name: Lorenzo Arellano  Age: 60 y.o. YOB: 1960  Gender: female  MRN: [id number]    Post Operative Visit: left ultra sound carpal tunnel release - Left, left cubital tunnel release - Left, and left thumb cmc arthroplasty - Left    HPI: Patient is status post left ultra sound carpal tunnel release - Left, left cubital tunnel release - Left, and left thumb cmc arthroplasty - Left on 5/25/2023. Patient reports ongoing pain, she reports pain on the palmar aspect of the wrist, on the radial styloid and on the ulnar side of the wrist, the radial styloid pain appears to be the worst.    Physical Examination:  Well-healed surgical wounds. Sensation is intact in all fingers. Motor exam reveals no deficits. Severe tense ovation of the first dorsal extensor compartment, difficult to assess for [facility] test given her recent surgery at the thumb [facility] [place] joint, moderate to severe translocation of the TFCC fovea, stable DRUJ. There are some tenderness on the radial pillar. Imaging:     none    Assessment:   1. Arthritis of carpometacarpal (CMC) joint of left thumb    2. Cubital tunnel syndrome on left    3. Left carpal tunnel syndrome         Status post left ultra sound carpal tunnel release - Left, left cubital tunnel release - Left, and left thumb cmc arthroplasty - Left on 5/25/2023    Plan:  We discussed the post operative course and progression. Left first dorsal extensor compartment steroid injection, Mobic 7.5 daily for 2 months, I will reassess in 2 months, I believe she has de Quervain's tendinitis which can be seen after suspension arthroplasty of the thumb, she does have pain at the TFCC fovea, this could be aggravated by her therapy, I offered a steroid injection for the TFCC fovea but patient politely declined    Procedure Note    The risk, benefits and alternatives of injection and no injection therapy were discussed.  The patient consented for an

## 2023-09-07 ENCOUNTER — OFFICE VISIT (OUTPATIENT)
Dept: FAMILY MEDICINE CLINIC | Facility: CLINIC | Age: 63
End: 2023-09-07
Payer: MEDICARE

## 2023-09-07 VITALS
BODY MASS INDEX: 35.3 KG/M2 | HEART RATE: 60 BPM | DIASTOLIC BLOOD PRESSURE: 78 MMHG | TEMPERATURE: 98.3 F | SYSTOLIC BLOOD PRESSURE: 122 MMHG | HEIGHT: 61 IN | OXYGEN SATURATION: 97 % | WEIGHT: 187 LBS

## 2023-09-07 DIAGNOSIS — B37.49 CANDIDA INFECTION OF GENITAL REGION: ICD-10-CM

## 2023-09-07 DIAGNOSIS — J01.90 ACUTE BACTERIAL SINUSITIS: Primary | ICD-10-CM

## 2023-09-07 DIAGNOSIS — B96.89 ACUTE BACTERIAL SINUSITIS: Primary | ICD-10-CM

## 2023-09-07 PROCEDURE — 99213 OFFICE O/P EST LOW 20 MIN: CPT | Performed by: NURSE PRACTITIONER

## 2023-09-07 PROCEDURE — 3017F COLORECTAL CA SCREEN DOC REV: CPT | Performed by: NURSE PRACTITIONER

## 2023-09-07 PROCEDURE — 1036F TOBACCO NON-USER: CPT | Performed by: NURSE PRACTITIONER

## 2023-09-07 PROCEDURE — G8427 DOCREV CUR MEDS BY ELIG CLIN: HCPCS | Performed by: NURSE PRACTITIONER

## 2023-09-07 PROCEDURE — G8417 CALC BMI ABV UP PARAM F/U: HCPCS | Performed by: NURSE PRACTITIONER

## 2023-09-07 RX ORDER — AMOXICILLIN AND CLAVULANATE POTASSIUM 875; 125 MG/1; MG/1
1 TABLET, FILM COATED ORAL 2 TIMES DAILY
Qty: 14 TABLET | Refills: 0 | Status: SHIPPED | OUTPATIENT
Start: 2023-09-07 | End: 2023-09-14

## 2023-09-07 RX ORDER — FLUCONAZOLE 150 MG/1
150 TABLET ORAL ONCE
Qty: 1 TABLET | Refills: 0 | Status: SHIPPED | OUTPATIENT
Start: 2023-09-07 | End: 2023-09-07

## 2023-09-07 ASSESSMENT — PATIENT HEALTH QUESTIONNAIRE - PHQ9
SUM OF ALL RESPONSES TO PHQ QUESTIONS 1-9: 8
1. LITTLE INTEREST OR PLEASURE IN DOING THINGS: 1
8. MOVING OR SPEAKING SO SLOWLY THAT OTHER PEOPLE COULD HAVE NOTICED. OR THE OPPOSITE, BEING SO FIGETY OR RESTLESS THAT YOU HAVE BEEN MOVING AROUND A LOT MORE THAN USUAL: 0
SUM OF ALL RESPONSES TO PHQ QUESTIONS 1-9: 8
3. TROUBLE FALLING OR STAYING ASLEEP: 2
SUM OF ALL RESPONSES TO PHQ9 QUESTIONS 1 & 2: 2
SUM OF ALL RESPONSES TO PHQ QUESTIONS 1-9: 8
6. FEELING BAD ABOUT YOURSELF - OR THAT YOU ARE A FAILURE OR HAVE LET YOURSELF OR YOUR FAMILY DOWN: 1
2. FEELING DOWN, DEPRESSED OR HOPELESS: 1
9. THOUGHTS THAT YOU WOULD BE BETTER OFF DEAD, OR OF HURTING YOURSELF: 0
SUM OF ALL RESPONSES TO PHQ QUESTIONS 1-9: 8
10. IF YOU CHECKED OFF ANY PROBLEMS, HOW DIFFICULT HAVE THESE PROBLEMS MADE IT FOR YOU TO DO YOUR WORK, TAKE CARE OF THINGS AT HOME, OR GET ALONG WITH OTHER PEOPLE: 1
7. TROUBLE CONCENTRATING ON THINGS, SUCH AS READING THE NEWSPAPER OR WATCHING TELEVISION: 0
5. POOR APPETITE OR OVEREATING: 0
4. FEELING TIRED OR HAVING LITTLE ENERGY: 3

## 2023-09-07 ASSESSMENT — ENCOUNTER SYMPTOMS
SINUS PRESSURE: 1
SINUS PAIN: 1
COUGH: 1
NAUSEA: 1
SINUS COMPLAINT: 1
VOMITING: 1
WHEEZING: 0
RHINORRHEA: 1
SHORTNESS OF BREATH: 0
SORE THROAT: 1

## 2023-09-07 NOTE — PROGRESS NOTES
Cecilia Carrillo is a 60 y.o. female who presents today for the following:  Chief Complaint   Patient presents with    Sinus Problem     Pt has been having sinus pressure x 1 wk. Other     Pt has some Rt big toe pain. Allergies   Allergen Reactions    Oxycodone-Acetaminophen Rash       Current Outpatient Medications   Medication Sig Dispense Refill    amoxicillin-clavulanate (AUGMENTIN) 875-125 MG per tablet Take 1 tablet by mouth 2 times daily for 7 days 14 tablet 0    meloxicam (MOBIC) 7.5 MG tablet Take 1 tablet by mouth daily 60 tablet 0    atorvastatin (LIPITOR) 10 MG tablet Take 1 tablet by mouth daily 90 tablet 1    lisinopril (PRINIVIL;ZESTRIL) 10 MG tablet Take 1 tablet by mouth nightly 90 tablet 1    citalopram (CELEXA) 20 MG tablet Take 1 tablet by mouth nightly Take 1  1/2 po daily 135 tablet 1    glipiZIDE (GLUCOTROL XL) 2.5 MG extended release tablet Take 1 tablet by mouth in the morning and at bedtime 180 tablet 1    montelukast (SINGULAIR) 10 MG tablet Take 1 tablet by mouth nightly 90 tablet 1    omeprazole (PRILOSEC) 20 MG delayed release capsule Take 1 capsule by mouth daily 90 capsule 1    topiramate (TOPAMAX) 25 MG tablet Take 1 tablet by mouth daily 1 tab po daily 90 tablet 0    clotrimazole-betamethasone (LOTRISONE) 1-0.05 % cream Apply topically 2 times daily. 30 g 0    ondansetron (ZOFRAN) 4 MG tablet Take 1 tablet by mouth every 8 hours as needed for Nausea or Vomiting 20 tablet 0    gabapentin (NEURONTIN) 100 MG capsule Take 1 capsule by mouth nightly.       albuterol sulfate HFA (PROVENTIL;VENTOLIN;PROAIR) 108 (90 Base) MCG/ACT inhaler Inhale 2 puffs into the lungs every 6 hours as needed for Shortness of Breath 18 g 1    fluticasone (FLONASE) 50 MCG/ACT nasal spray 2 sprays by Nasal route daily 16 g 2    albuterol (PROVENTIL) (2.5 MG/3ML) 0.083% nebulizer solution Take 3 mLs by nebulization every 6 hours as needed for Wheezing or Shortness of Breath 120 each 1

## 2023-10-24 ENCOUNTER — OFFICE VISIT (OUTPATIENT)
Dept: ORTHOPEDIC SURGERY | Age: 63
End: 2023-10-24
Payer: MEDICARE

## 2023-10-24 DIAGNOSIS — M18.12 ARTHRITIS OF CARPOMETACARPAL (CMC) JOINT OF LEFT THUMB: ICD-10-CM

## 2023-10-24 DIAGNOSIS — M65.4 DE QUERVAIN'S TENOSYNOVITIS, LEFT: ICD-10-CM

## 2023-10-24 DIAGNOSIS — S62.102A CLOSED FRACTURE OF LEFT WRIST, INITIAL ENCOUNTER: ICD-10-CM

## 2023-10-24 DIAGNOSIS — M65.9 SYNOVITIS AND TENOSYNOVITIS: ICD-10-CM

## 2023-10-24 DIAGNOSIS — G56.02 LEFT CARPAL TUNNEL SYNDROME: ICD-10-CM

## 2023-10-24 DIAGNOSIS — M19.90 INFLAMMATORY ARTHROPATHY: ICD-10-CM

## 2023-10-24 DIAGNOSIS — M24.132 DEGENERATIVE TEAR OF TRIANGULAR FIBROCARTILAGE COMPLEX (TFCC) OF LEFT WRIST: Primary | ICD-10-CM

## 2023-10-24 PROCEDURE — 1036F TOBACCO NON-USER: CPT | Performed by: ORTHOPAEDIC SURGERY

## 2023-10-24 PROCEDURE — G8428 CUR MEDS NOT DOCUMENT: HCPCS | Performed by: ORTHOPAEDIC SURGERY

## 2023-10-24 PROCEDURE — G8484 FLU IMMUNIZE NO ADMIN: HCPCS | Performed by: ORTHOPAEDIC SURGERY

## 2023-10-24 PROCEDURE — 3017F COLORECTAL CA SCREEN DOC REV: CPT | Performed by: ORTHOPAEDIC SURGERY

## 2023-10-24 PROCEDURE — 99214 OFFICE O/P EST MOD 30 MIN: CPT | Performed by: ORTHOPAEDIC SURGERY

## 2023-10-24 PROCEDURE — G8417 CALC BMI ABV UP PARAM F/U: HCPCS | Performed by: ORTHOPAEDIC SURGERY

## 2023-10-24 RX ORDER — MELOXICAM 15 MG/1
15 TABLET ORAL DAILY
Qty: 90 TABLET | Refills: 0 | Status: CANCELLED | OUTPATIENT
Start: 2023-10-24 | End: 2024-01-22

## 2023-10-24 RX ORDER — BETAMETHASONE SODIUM PHOSPHATE AND BETAMETHASONE ACETATE 3; 3 MG/ML; MG/ML
6 INJECTION, SUSPENSION INTRA-ARTICULAR; INTRALESIONAL; INTRAMUSCULAR; SOFT TISSUE ONCE
Status: COMPLETED | OUTPATIENT
Start: 2023-10-24 | End: 2023-10-24

## 2023-10-24 RX ORDER — MELOXICAM 7.5 MG/1
7.5 TABLET ORAL 2 TIMES DAILY
Qty: 180 TABLET | Refills: 0 | Status: SHIPPED | OUTPATIENT
Start: 2023-10-24 | End: 2024-01-22

## 2023-10-24 RX ADMIN — BETAMETHASONE SODIUM PHOSPHATE AND BETAMETHASONE ACETATE 6 MG: 3; 3 INJECTION, SUSPENSION INTRA-ARTICULAR; INTRALESIONAL; INTRAMUSCULAR; SOFT TISSUE at 08:19

## 2023-10-24 NOTE — PROGRESS NOTES
Orthopaedic Hand Surgery Note    Name: Leticia Drew  Age: 60 y.o. YOB: 1960  Gender: female  MRN: [id number]    Post Operative Visit: left ultra sound carpal tunnel release - Left, left cubital tunnel release - Left, and left thumb cmc arthroplasty - Left    HPI: Patient is status post left ultra sound carpal tunnel release - Left, left cubital tunnel release - Left, and left thumb cmc arthroplasty - Left on 5/25/2023. Patient reports she is better than before surgery but not quite 100%, she continues to have pain on the radial styloid, the last injection provided great relief, she feels like this pain is still there but it is very mild, she also has pain on the ulnar side but does not really notice that 1 day today, she continues to have pain at the palm. Physical Examination:  Well-healed surgical wounds. Sensation is intact in all fingers. Motor exam reveals no deficits. Tenderness palpation of the radial and ulnar pillar, more so than would be expected 5 months after surgery, she has moderate tenderness ovation of the TFCC fovea however, patient reports that this pain is not usually noticeable during the day unless I push on it. She has moderate tenderness ovation of the radial styloid but not at the [facility] arthroplasty space. Imaging:     X-ray of the left wrist before surgery was reviewed, this demonstrates cystic changes of the lunate and capitate which could represent inflammation    Assessment:   1. 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 Arthritis of carpometacarpal (CMC) joint of left thumb    3. Left carpal tunnel syndrome    4. Closed fracture of left wrist, initial encounter    5.  De Quervain's tenosynovitis, left         Status post left ultra sound carpal tunnel release - Left, left cubital tunnel release - Left, and left thumb cmc arthroplasty - Left on 5/25/2023    Plan:  We discussed the post operative course and

## 2023-11-14 ENCOUNTER — OFFICE VISIT (OUTPATIENT)
Dept: ENT CLINIC | Age: 63
End: 2023-11-14
Payer: MEDICARE

## 2023-11-14 VITALS — HEIGHT: 61 IN | WEIGHT: 185 LBS | BODY MASS INDEX: 34.93 KG/M2

## 2023-11-14 DIAGNOSIS — R09.82 PND (POST-NASAL DRIP): Primary | ICD-10-CM

## 2023-11-14 DIAGNOSIS — R05.3 CHRONIC COUGH: ICD-10-CM

## 2023-11-14 PROCEDURE — 3017F COLORECTAL CA SCREEN DOC REV: CPT | Performed by: OTOLARYNGOLOGY

## 2023-11-14 PROCEDURE — G8428 CUR MEDS NOT DOCUMENT: HCPCS | Performed by: OTOLARYNGOLOGY

## 2023-11-14 PROCEDURE — 1036F TOBACCO NON-USER: CPT | Performed by: OTOLARYNGOLOGY

## 2023-11-14 PROCEDURE — 99213 OFFICE O/P EST LOW 20 MIN: CPT | Performed by: OTOLARYNGOLOGY

## 2023-11-14 PROCEDURE — G8484 FLU IMMUNIZE NO ADMIN: HCPCS | Performed by: OTOLARYNGOLOGY

## 2023-11-14 PROCEDURE — G8417 CALC BMI ABV UP PARAM F/U: HCPCS | Performed by: OTOLARYNGOLOGY

## 2023-11-14 RX ORDER — IPRATROPIUM BROMIDE AND ALBUTEROL SULFATE 2.5; .5 MG/3ML; MG/3ML
SOLUTION RESPIRATORY (INHALATION)
COMMUNITY
Start: 2023-10-15

## 2023-11-14 RX ORDER — LEVOFLOXACIN 500 MG/1
500 TABLET, FILM COATED ORAL DAILY
Qty: 10 TABLET | Refills: 0 | Status: SHIPPED | OUTPATIENT
Start: 2023-11-14 | End: 2023-11-24

## 2023-11-14 RX ORDER — TIZANIDINE 4 MG/1
TABLET ORAL
COMMUNITY
Start: 2023-10-10

## 2023-11-14 RX ORDER — PREDNISONE 20 MG/1
40 TABLET ORAL DAILY
Qty: 14 TABLET | Refills: 0 | Status: SHIPPED | OUTPATIENT
Start: 2023-11-14 | End: 2023-11-21

## 2023-11-14 ASSESSMENT — ENCOUNTER SYMPTOMS
SHORTNESS OF BREATH: 0
ABDOMINAL PAIN: 0
COUGH: 1
SORE THROAT: 0

## 2024-01-17 DIAGNOSIS — I10 PRIMARY HYPERTENSION: ICD-10-CM

## 2024-01-17 DIAGNOSIS — J45.909 MILD REACTIVE AIRWAYS DISEASE, UNSPECIFIED WHETHER PERSISTENT: ICD-10-CM

## 2024-01-17 DIAGNOSIS — F33.0 MAJOR DEPRESSIVE DISORDER, RECURRENT, MILD (HCC): ICD-10-CM

## 2024-01-17 DIAGNOSIS — K21.9 GASTROESOPHAGEAL REFLUX DISEASE WITHOUT ESOPHAGITIS: ICD-10-CM

## 2024-01-17 DIAGNOSIS — E66.09 CLASS 1 OBESITY DUE TO EXCESS CALORIES WITHOUT SERIOUS COMORBIDITY WITH BODY MASS INDEX (BMI) OF 34.0 TO 34.9 IN ADULT: Primary | ICD-10-CM

## 2024-01-17 DIAGNOSIS — E11.9 TYPE 2 DIABETES MELLITUS WITHOUT COMPLICATION, WITHOUT LONG-TERM CURRENT USE OF INSULIN (HCC): ICD-10-CM

## 2024-01-17 DIAGNOSIS — E78.2 MIXED HYPERLIPIDEMIA: ICD-10-CM

## 2024-01-17 RX ORDER — ATORVASTATIN CALCIUM 10 MG/1
10 TABLET, FILM COATED ORAL DAILY
Qty: 90 TABLET | Refills: 1 | Status: SHIPPED | OUTPATIENT
Start: 2024-01-17

## 2024-01-17 RX ORDER — MONTELUKAST SODIUM 10 MG/1
10 TABLET ORAL NIGHTLY
Qty: 90 TABLET | Refills: 1 | Status: SHIPPED | OUTPATIENT
Start: 2024-01-17

## 2024-01-17 RX ORDER — LISINOPRIL 10 MG/1
10 TABLET ORAL
Qty: 90 TABLET | Refills: 1 | Status: SHIPPED | OUTPATIENT
Start: 2024-01-17

## 2024-01-17 RX ORDER — CITALOPRAM 20 MG/1
20 TABLET ORAL
Qty: 135 TABLET | Refills: 1 | Status: SHIPPED | OUTPATIENT
Start: 2024-01-17

## 2024-01-17 RX ORDER — OMEPRAZOLE 20 MG/1
20 CAPSULE, DELAYED RELEASE ORAL DAILY
Qty: 90 CAPSULE | Refills: 1 | Status: SHIPPED | OUTPATIENT
Start: 2024-01-17

## 2024-01-17 RX ORDER — GLIPIZIDE 2.5 MG/1
2.5 TABLET, EXTENDED RELEASE ORAL 2 TIMES DAILY
Qty: 180 TABLET | Refills: 1 | Status: SHIPPED | OUTPATIENT
Start: 2024-01-17

## 2024-02-27 ENCOUNTER — OFFICE VISIT (OUTPATIENT)
Dept: ORTHOPEDIC SURGERY | Age: 64
End: 2024-02-27
Payer: MEDICARE

## 2024-02-27 DIAGNOSIS — S62.102A CLOSED FRACTURE OF LEFT WRIST, INITIAL ENCOUNTER: ICD-10-CM

## 2024-02-27 DIAGNOSIS — M65.4 DE QUERVAIN'S TENOSYNOVITIS, LEFT: ICD-10-CM

## 2024-02-27 DIAGNOSIS — M19.032 ARTHRITIS OF WRIST, LEFT: ICD-10-CM

## 2024-02-27 DIAGNOSIS — M18.12 ARTHRITIS OF CARPOMETACARPAL (CMC) JOINT OF LEFT THUMB: Primary | ICD-10-CM

## 2024-02-27 PROCEDURE — 1036F TOBACCO NON-USER: CPT | Performed by: ORTHOPAEDIC SURGERY

## 2024-02-27 PROCEDURE — G8417 CALC BMI ABV UP PARAM F/U: HCPCS | Performed by: ORTHOPAEDIC SURGERY

## 2024-02-27 PROCEDURE — G8484 FLU IMMUNIZE NO ADMIN: HCPCS | Performed by: ORTHOPAEDIC SURGERY

## 2024-02-27 PROCEDURE — G8428 CUR MEDS NOT DOCUMENT: HCPCS | Performed by: ORTHOPAEDIC SURGERY

## 2024-02-27 PROCEDURE — 99214 OFFICE O/P EST MOD 30 MIN: CPT | Performed by: ORTHOPAEDIC SURGERY

## 2024-02-27 PROCEDURE — 3017F COLORECTAL CA SCREEN DOC REV: CPT | Performed by: ORTHOPAEDIC SURGERY

## 2024-02-27 RX ORDER — CELECOXIB 100 MG/1
100 CAPSULE ORAL 2 TIMES DAILY
Qty: 60 CAPSULE | Refills: 1 | Status: SHIPPED | OUTPATIENT
Start: 2024-02-27 | End: 2024-04-27

## 2024-02-27 NOTE — PROGRESS NOTES
Orthopaedic Hand Surgery Note    Name: Lesly Diego  Age: 63 y.o.  YOB: 1960  Gender: female  MRN: [id number]    Post Operative Visit: left ultra sound carpal tunnel release - Left, left cubital tunnel release - Left, and left thumb cmc arthroplasty - Left    HPI: Patient is status post left ultra sound carpal tunnel release - Left, left cubital tunnel release - Left, and left thumb cmc arthroplasty - Left on 5/25/2023. Patient reports intermittent pain, some pain on the radial styloid, some into the medial side of the elbow, the last injection for de Quervain's tendinitis did not provide any relief whatsoever.    Physical Examination:  Well-healed surgical wounds. Sensation is intact in all fingers. Motor exam reveals no deficits.  Overall examination is somewhat nonspecific, there is some tenderness palpation of the radial styloid but this is very mild, there is some tenderness palpation of the CMC arthroplasty space, some discomfort with palpation of the proximal ulnar aspect of the forearm just distal to the cubital tunnel incision.    Imaging:     left Hand XR: AP, Lateral, Oblique and Thumb CMC joint     Clinical Indication:  1. Arthritis of carpometacarpal (CMC) joint of left thumb    2. De Quervain's tenosynovitis, left    3. Closed fracture of left wrist, initial encounter    4. Arthritis of wrist, left           Report: AP, lateral, oblique and thumb CMC joint hand XRs demonstrates well-maintained arthroplasty space, narrowing of the STT joint on the AP view however, on the oblique view there appears to be good space between the scaphoid and the trapezoid, very subtle irregularity of the distal radius lunate facet only seen on the lateral view, these are changes consistent with posttraumatic arthritis of the distal radius    Impression: as above     Lazarus Luis MD         Assessment:   1. Arthritis of carpometacarpal (CMC) joint of left thumb    2. De Quervain's

## 2024-05-07 ENCOUNTER — OFFICE VISIT (OUTPATIENT)
Age: 64
End: 2024-05-07
Payer: MEDICARE

## 2024-05-07 ENCOUNTER — TELEPHONE (OUTPATIENT)
Dept: ORTHOPEDIC SURGERY | Age: 64
End: 2024-05-07

## 2024-05-07 DIAGNOSIS — S62.647A CLOSED NONDISPLACED FRACTURE OF PROXIMAL PHALANX OF LEFT LITTLE FINGER, INITIAL ENCOUNTER: ICD-10-CM

## 2024-05-07 DIAGNOSIS — M79.642 LEFT HAND PAIN: Primary | ICD-10-CM

## 2024-05-07 PROCEDURE — 99214 OFFICE O/P EST MOD 30 MIN: CPT | Performed by: ORTHOPAEDIC SURGERY

## 2024-05-07 PROCEDURE — 1036F TOBACCO NON-USER: CPT | Performed by: ORTHOPAEDIC SURGERY

## 2024-05-07 PROCEDURE — G8417 CALC BMI ABV UP PARAM F/U: HCPCS | Performed by: ORTHOPAEDIC SURGERY

## 2024-05-07 PROCEDURE — G8428 CUR MEDS NOT DOCUMENT: HCPCS | Performed by: ORTHOPAEDIC SURGERY

## 2024-05-07 PROCEDURE — 3017F COLORECTAL CA SCREEN DOC REV: CPT | Performed by: ORTHOPAEDIC SURGERY

## 2024-05-07 NOTE — PROGRESS NOTES
Orthopaedic Hand Clinic Note    Name: Lesly Diego  Age: 63 y.o.  YOB: 1960  Gender: female  MRN: [id number]      Follow up visit:   1. Left hand pain    2. Closed nondisplaced fracture of proximal phalanx of left little finger, initial encounter        HPI: Lesly Diego is a 63 y.o. female who is following up for an unrelated issue, the patient had a fall onto the left hand yesterday, she reports the middle, ring and small fingers all hyperextended and she noticed instant swelling, ecchymosis and pain.      ROS/Meds/PSH/PMH/FH/SH: I personally reviewed the patients standard intake form.  Pertinents are discussed in the HPI    Physical Examination:  General: Awake and alert.  HEENT: Normocephalic, atraumatic  CV/Pulm: Breathing even and unlabored  Skin: No obvious rashes noted.  Lymphatic: No obvious evidence of lymphedema or lymphadenopathy    Musculoskeletal Examination:  Examination on the left upper extremity demonstrates cap refill < 5 seconds in all fingers, severe tenderness palpation along the MCP joints as well as the proximal phalanx of the middle, ring and small fingers, she can make a full composite fist except with the small finger which has a 1.5 cm tip to palm distance.    Imaging / Electrodiagnostic Tests:     left Hand XR: AP, Lateral, Oblique and Thumb CMC joint     Clinical Indication:  1. Left hand pain    2. Closed nondisplaced fracture of proximal phalanx of left little finger, initial encounter           Report: AP, lateral, oblique and thumb CMC joint hand XRs demonstrates well-maintained joint spaces without evidence of malalignment, there is a cortical lucency on the left small finger proximal phalanx only seen on the oblique view which likely represents a minimally displaced fracture    Impression: as above     Lazarus Luis MD         Assessment:   1. Left hand pain    2. Closed nondisplaced fracture of proximal phalanx of left little finger,  normal...

## 2024-05-07 NOTE — TELEPHONE ENCOUNTER
Appointment Request  (Newest Message First)  Lesly Diego  [facility]  Staff11 hours ago (11:40 PM)       Appointment Request From: Lesly Diego     With Provider: Lazarus Luis MD [[facility]]     Preferred Date Range: Any     Preferred Times: Any Time     Reason for visit: Request an Appointment     Comments:  I fell today& my fingers on my left hand are swollen &blue i barely move'em

## 2024-06-04 ENCOUNTER — OFFICE VISIT (OUTPATIENT)
Age: 64
End: 2024-06-04
Payer: MEDICARE

## 2024-06-04 DIAGNOSIS — S62.647A CLOSED NONDISPLACED FRACTURE OF PROXIMAL PHALANX OF LEFT LITTLE FINGER, INITIAL ENCOUNTER: ICD-10-CM

## 2024-06-04 DIAGNOSIS — M79.642 LEFT HAND PAIN: Primary | ICD-10-CM

## 2024-06-04 PROCEDURE — G8428 CUR MEDS NOT DOCUMENT: HCPCS | Performed by: ORTHOPAEDIC SURGERY

## 2024-06-04 PROCEDURE — 99213 OFFICE O/P EST LOW 20 MIN: CPT | Performed by: ORTHOPAEDIC SURGERY

## 2024-06-04 PROCEDURE — 3017F COLORECTAL CA SCREEN DOC REV: CPT | Performed by: ORTHOPAEDIC SURGERY

## 2024-06-04 PROCEDURE — G8417 CALC BMI ABV UP PARAM F/U: HCPCS | Performed by: ORTHOPAEDIC SURGERY

## 2024-06-04 PROCEDURE — 1036F TOBACCO NON-USER: CPT | Performed by: ORTHOPAEDIC SURGERY

## 2024-06-04 NOTE — PROGRESS NOTES
Orthopaedic Hand Clinic Note    Name: Lesly Diego  Age: 63 y.o.  YOB: 1960  Gender: female  MRN: [id number]      Follow up visit:   1. Left hand pain    2. Closed nondisplaced fracture of proximal phalanx of left little finger, initial encounter        HPI: Lesly Diego is a 63 y.o. female who is following up for left small finger proximal phalanx fracture 5 weeks ago, patient reports she is much better.      ROS/Meds/PSH/PMH/FH/SH: I personally reviewed the patients standard intake form.  Pertinents are discussed in the HPI    Physical Examination:  General: Awake and alert.  HEENT: Normocephalic, atraumatic  CV/Pulm: Breathing even and unlabored  Skin: No obvious rashes noted.  Lymphatic: No obvious evidence of lymphedema or lymphadenopathy    Musculoskeletal Examination:  Examination on the left upper extremity demonstrates cap refill < 5 seconds in all fingers, passively she has full motion but actively she has limited motion, 1 cm tip to palm distance with a small finger, she can make a fist with all the other fingers.    Imaging / Electrodiagnostic Tests:     left small finger XR: AP, Lateral, Oblique views     Clinical Indication  1. Left hand pain    2. Closed nondisplaced fracture of proximal phalanx of left little finger, initial encounter           Report: AP, Lateral, Oblique XR demonstrates nondisplaced fracture of the left small finger proximal phalanx in unchanged alignment    Impression: as above     Lazarus Luis MD         Assessment:   1. Left hand pain    2. Closed nondisplaced fracture of proximal phalanx of left little finger, initial encounter        Plan:   We discussed the diagnosis and different treatment options. We discussed observation, therapy, antiinflammatory medications and other pertinent treatment modalities.    After discussing in detail the patient elects to proceed with slow progression to activity as tolerated, over-the-counter

## 2024-06-13 ENCOUNTER — OFFICE VISIT (OUTPATIENT)
Dept: FAMILY MEDICINE CLINIC | Facility: CLINIC | Age: 64
End: 2024-06-13
Payer: MEDICARE

## 2024-06-13 VITALS
BODY MASS INDEX: 36.14 KG/M2 | SYSTOLIC BLOOD PRESSURE: 146 MMHG | HEART RATE: 79 BPM | DIASTOLIC BLOOD PRESSURE: 64 MMHG | WEIGHT: 191.4 LBS | HEIGHT: 61 IN | OXYGEN SATURATION: 96 %

## 2024-06-13 DIAGNOSIS — I10 PRIMARY HYPERTENSION: Primary | ICD-10-CM

## 2024-06-13 DIAGNOSIS — I10 PRIMARY HYPERTENSION: ICD-10-CM

## 2024-06-13 DIAGNOSIS — E66.09 CLASS 1 OBESITY DUE TO EXCESS CALORIES WITHOUT SERIOUS COMORBIDITY WITH BODY MASS INDEX (BMI) OF 34.0 TO 34.9 IN ADULT: ICD-10-CM

## 2024-06-13 DIAGNOSIS — E78.2 MIXED HYPERLIPIDEMIA: ICD-10-CM

## 2024-06-13 DIAGNOSIS — E11.9 TYPE 2 DIABETES MELLITUS WITHOUT COMPLICATION, WITHOUT LONG-TERM CURRENT USE OF INSULIN (HCC): ICD-10-CM

## 2024-06-13 DIAGNOSIS — K21.9 GASTROESOPHAGEAL REFLUX DISEASE WITHOUT ESOPHAGITIS: ICD-10-CM

## 2024-06-13 DIAGNOSIS — L23.9 ALLERGIC CONTACT DERMATITIS, UNSPECIFIED TRIGGER: ICD-10-CM

## 2024-06-13 LAB
ALBUMIN SERPL-MCNC: 4.2 G/DL (ref 3.2–4.6)
ALBUMIN/GLOB SERPL: 1.3 (ref 1–1.9)
ALP SERPL-CCNC: 112 U/L (ref 35–104)
ALT SERPL-CCNC: 22 U/L (ref 12–65)
ANION GAP SERPL CALC-SCNC: 10 MMOL/L (ref 9–18)
AST SERPL-CCNC: 33 U/L (ref 15–37)
BASOPHILS # BLD: 0 K/UL (ref 0–0.2)
BASOPHILS NFR BLD: 1 % (ref 0–2)
BILIRUB SERPL-MCNC: 0.6 MG/DL (ref 0–1.2)
BUN SERPL-MCNC: 13 MG/DL (ref 8–23)
CALCIUM SERPL-MCNC: 9.2 MG/DL (ref 8.8–10.2)
CHLORIDE SERPL-SCNC: 104 MMOL/L (ref 98–107)
CHOLEST SERPL-MCNC: 146 MG/DL (ref 0–200)
CK SERPL-CCNC: 263 U/L (ref 21–215)
CO2 SERPL-SCNC: 26 MMOL/L (ref 20–28)
CREAT SERPL-MCNC: 0.71 MG/DL (ref 0.6–1.1)
DIFFERENTIAL METHOD BLD: ABNORMAL
EOSINOPHIL # BLD: 0.5 K/UL (ref 0–0.8)
EOSINOPHIL NFR BLD: 8 % (ref 0.5–7.8)
ERYTHROCYTE [DISTWIDTH] IN BLOOD BY AUTOMATED COUNT: 15 % (ref 11.9–14.6)
EST. AVERAGE GLUCOSE BLD GHB EST-MCNC: 152 MG/DL
GLOBULIN SER CALC-MCNC: 3.2 G/DL (ref 2.3–3.5)
GLUCOSE SERPL-MCNC: 126 MG/DL (ref 70–99)
HBA1C MFR BLD: 6.9 % (ref 0–5.6)
HCT VFR BLD AUTO: 37.9 % (ref 35.8–46.3)
HDLC SERPL-MCNC: 53 MG/DL (ref 40–60)
HDLC SERPL: 2.8 (ref 0–5)
HGB BLD-MCNC: 11.3 G/DL (ref 11.7–15.4)
IMM GRANULOCYTES # BLD AUTO: 0 K/UL (ref 0–0.5)
IMM GRANULOCYTES NFR BLD AUTO: 0 % (ref 0–5)
LDLC SERPL CALC-MCNC: 69 MG/DL (ref 0–100)
LYMPHOCYTES # BLD: 1.5 K/UL (ref 0.5–4.6)
LYMPHOCYTES NFR BLD: 25 % (ref 13–44)
MAGNESIUM SERPL-MCNC: 2.2 MG/DL (ref 1.8–2.4)
MCH RBC QN AUTO: 28 PG (ref 26.1–32.9)
MCHC RBC AUTO-ENTMCNC: 29.8 G/DL (ref 31.4–35)
MCV RBC AUTO: 94 FL (ref 82–102)
MONOCYTES # BLD: 0.5 K/UL (ref 0.1–1.3)
MONOCYTES NFR BLD: 8 % (ref 4–12)
NEUTS SEG # BLD: 3.5 K/UL (ref 1.7–8.2)
NEUTS SEG NFR BLD: 59 % (ref 43–78)
NRBC # BLD: 0 K/UL (ref 0–0.2)
PLATELET # BLD AUTO: 324 K/UL (ref 150–450)
PMV BLD AUTO: 10.7 FL (ref 9.4–12.3)
POTASSIUM SERPL-SCNC: 4.5 MMOL/L (ref 3.5–5.1)
PROT SERPL-MCNC: 7.4 G/DL (ref 6.3–8.2)
RBC # BLD AUTO: 4.03 M/UL (ref 4.05–5.2)
SODIUM SERPL-SCNC: 141 MMOL/L (ref 136–145)
TRIGL SERPL-MCNC: 119 MG/DL (ref 0–150)
TSH W FREE THYROID IF ABNORMAL: 0.97 UIU/ML (ref 0.27–4.2)
VLDLC SERPL CALC-MCNC: 24 MG/DL (ref 6–23)
WBC # BLD AUTO: 5.9 K/UL (ref 4.3–11.1)

## 2024-06-13 PROCEDURE — G8417 CALC BMI ABV UP PARAM F/U: HCPCS | Performed by: NURSE PRACTITIONER

## 2024-06-13 PROCEDURE — 3078F DIAST BP <80 MM HG: CPT | Performed by: NURSE PRACTITIONER

## 2024-06-13 PROCEDURE — 1036F TOBACCO NON-USER: CPT | Performed by: NURSE PRACTITIONER

## 2024-06-13 PROCEDURE — 3077F SYST BP >= 140 MM HG: CPT | Performed by: NURSE PRACTITIONER

## 2024-06-13 PROCEDURE — G8427 DOCREV CUR MEDS BY ELIG CLIN: HCPCS | Performed by: NURSE PRACTITIONER

## 2024-06-13 PROCEDURE — 3017F COLORECTAL CA SCREEN DOC REV: CPT | Performed by: NURSE PRACTITIONER

## 2024-06-13 PROCEDURE — 99214 OFFICE O/P EST MOD 30 MIN: CPT | Performed by: NURSE PRACTITIONER

## 2024-06-13 RX ORDER — HYDROXYZINE HYDROCHLORIDE 25 MG/1
25 TABLET, FILM COATED ORAL EVERY 8 HOURS PRN
Qty: 30 TABLET | Refills: 0 | Status: SHIPPED | OUTPATIENT
Start: 2024-06-13 | End: 2024-06-23

## 2024-06-13 RX ORDER — ATORVASTATIN CALCIUM 10 MG/1
10 TABLET, FILM COATED ORAL DAILY
Qty: 90 TABLET | Refills: 0 | Status: SHIPPED | OUTPATIENT
Start: 2024-06-13

## 2024-06-13 RX ORDER — TRIAMCINOLONE ACETONIDE 1 MG/G
CREAM TOPICAL
Qty: 453.6 G | Refills: 0 | Status: SHIPPED | OUTPATIENT
Start: 2024-06-13

## 2024-06-13 RX ORDER — LISINOPRIL 10 MG/1
10 TABLET ORAL
Qty: 90 TABLET | Refills: 0 | Status: SHIPPED | OUTPATIENT
Start: 2024-06-13

## 2024-06-13 SDOH — ECONOMIC STABILITY: FOOD INSECURITY: WITHIN THE PAST 12 MONTHS, YOU WORRIED THAT YOUR FOOD WOULD RUN OUT BEFORE YOU GOT MONEY TO BUY MORE.: NEVER TRUE

## 2024-06-13 SDOH — ECONOMIC STABILITY: FOOD INSECURITY: WITHIN THE PAST 12 MONTHS, THE FOOD YOU BOUGHT JUST DIDN'T LAST AND YOU DIDN'T HAVE MONEY TO GET MORE.: NEVER TRUE

## 2024-06-13 SDOH — ECONOMIC STABILITY: INCOME INSECURITY: HOW HARD IS IT FOR YOU TO PAY FOR THE VERY BASICS LIKE FOOD, HOUSING, MEDICAL CARE, AND HEATING?: NOT VERY HARD

## 2024-06-13 ASSESSMENT — ENCOUNTER SYMPTOMS
SORE THROAT: 0
EYE PAIN: 0
VOMITING: 0
DIARRHEA: 0
NAIL CHANGES: 0
SHORTNESS OF BREATH: 0
COUGH: 1
RHINORRHEA: 1

## 2024-06-13 NOTE — PROGRESS NOTES
General: No focal deficit present.      Mental Status: She is alert and oriented to person, place, and time.   Psychiatric:         Mood and Affect: Mood normal.         Behavior: Behavior normal.         Thought Content: Thought content normal.         Judgment: Judgment normal.          1. Primary hypertension  -     lisinopril (PRINIVIL;ZESTRIL) 10 MG tablet; Take 1 tablet by mouth nightly, Disp-90 tablet, R-0Normal  2. Allergic contact dermatitis, unspecified trigger  -     hydrOXYzine HCl (ATARAX) 25 MG tablet; Take 1 tablet by mouth every 8 hours as needed for Itching, Disp-30 tablet, R-0Normal  -     triamcinolone (KENALOG) 0.1 % cream; Apply topically 2 times daily for up to two weeks., Disp-453.6 g, R-0, Normal  3. Mixed hyperlipidemia  -     atorvastatin (LIPITOR) 10 MG tablet; Take 1 tablet by mouth daily, Disp-90 tablet, R-0Normal     Recommended avoid triggers such as scented products, heat/moisture, etc and consider cool tepid baths.  Discussed hydroxyzine may cause drowsiness to take caution.  Refilled meds per request out of;  labs completed ordered by Gloria in January.  She had missed appointment due to GI illness and agrees to follow up asap regarding chronic conditions.  BP not at goal; needs refills and f/u with Gloria. Hold prednisone for now d/t hx diabetes and anxiety only if necessary.  Pt agrees.  Patient informed, we will call with blood work results within one week.  If you have not heard regarding results in over a week, please contact office.  You can also review results on [facility].       Follow-up and Dispositions    Return for follow up with Gloria first available .         Jennifer Schroeder, APRN - CNP

## 2024-06-20 NOTE — RESULT ENCOUNTER NOTE
Results to patient please.  Total cholesterol is 146, triglycerides 119, HDL is 53, and LDL 69. Electrolytes are within normal limits.  Glucose is 126. A1c is 6.9.  Is she  still is still on her diabetic med?  Hemoglobin is slightly decreased 11.3.  TSH is within normal limits as well as magnesium.  Thanks

## 2024-07-17 ENCOUNTER — APPOINTMENT (RX ONLY)
Dept: URBAN - METROPOLITAN AREA CLINIC 329 | Facility: CLINIC | Age: 64
Setting detail: DERMATOLOGY
End: 2024-07-17

## 2024-07-17 DIAGNOSIS — L23.9 ALLERGIC CONTACT DERMATITIS, UNSPECIFIED CAUSE: ICD-10-CM | Status: INADEQUATELY CONTROLLED

## 2024-07-17 PROCEDURE — ? PRESCRIPTION

## 2024-07-17 PROCEDURE — ? COUNSELING

## 2024-07-17 PROCEDURE — 99214 OFFICE O/P EST MOD 30 MIN: CPT

## 2024-07-17 PROCEDURE — ? TREATMENT REGIMEN

## 2024-07-17 RX ORDER — HYDROXYZINE HYDROCHLORIDE 10 MG/1
TABLET, FILM COATED ORAL
Qty: 60 | Refills: 0 | Status: ERX | COMMUNITY
Start: 2024-07-17

## 2024-07-17 RX ORDER — CLOBETASOL PROPIONATE 0.5 MG/G
CREAM TOPICAL BID
Qty: 60 | Refills: 2 | Status: ERX | COMMUNITY
Start: 2024-07-17

## 2024-07-17 RX ADMIN — HYDROXYZINE HYDROCHLORIDE: 10 TABLET, FILM COATED ORAL at 00:00

## 2024-07-17 RX ADMIN — CLOBETASOL PROPIONATE: 0.5 CREAM TOPICAL at 00:00

## 2024-07-17 ASSESSMENT — LOCATION SIMPLE DESCRIPTION DERM
LOCATION SIMPLE: RIGHT PRETIBIAL REGION
LOCATION SIMPLE: LEFT PRETIBIAL REGION
LOCATION SIMPLE: LEFT FOREARM
LOCATION SIMPLE: ABDOMEN
LOCATION SIMPLE: LEFT UPPER BACK

## 2024-07-17 ASSESSMENT — LOCATION ZONE DERM
LOCATION ZONE: TRUNK
LOCATION ZONE: LEG
LOCATION ZONE: ARM

## 2024-07-17 ASSESSMENT — LOCATION DETAILED DESCRIPTION DERM
LOCATION DETAILED: LEFT PROXIMAL PRETIBIAL REGION
LOCATION DETAILED: LEFT SUPERIOR UPPER BACK
LOCATION DETAILED: LEFT PROXIMAL DORSAL FOREARM
LOCATION DETAILED: RIGHT DISTAL PRETIBIAL REGION
LOCATION DETAILED: PERIUMBILICAL SKIN

## 2024-07-17 NOTE — PROCEDURE: TREATMENT REGIMEN
Discontinue Regimen: Dial soap
Initiate Treatment: CeraVe itch relief cream\\Uli free and clear detergent\\nDove sensitive skin soap
Samples Given: CeraVe itch relief cream
Detail Level: Zone

## 2024-08-02 ENCOUNTER — COMMUNITY OUTREACH (OUTPATIENT)
Dept: FAMILY MEDICINE CLINIC | Facility: CLINIC | Age: 64
End: 2024-08-02

## 2024-08-02 NOTE — PROGRESS NOTES
Patient's HM shows they are overdue for Colorectal Screening.   Care Everywhere and  files searched.  No results to attach to order nor HM updated.  Pt refuses CRS at this time.

## 2024-08-14 ENCOUNTER — RX ONLY (OUTPATIENT)
Age: 64
Setting detail: RX ONLY
End: 2024-08-14

## 2024-08-14 ENCOUNTER — APPOINTMENT (RX ONLY)
Dept: URBAN - METROPOLITAN AREA CLINIC 329 | Facility: CLINIC | Age: 64
Setting detail: DERMATOLOGY
End: 2024-08-14

## 2024-08-14 DIAGNOSIS — L23.9 ALLERGIC CONTACT DERMATITIS, UNSPECIFIED CAUSE: ICD-10-CM

## 2024-08-14 PROCEDURE — ? COUNSELING

## 2024-08-14 PROCEDURE — ? PRESCRIPTION

## 2024-08-14 PROCEDURE — 99214 OFFICE O/P EST MOD 30 MIN: CPT

## 2024-08-14 PROCEDURE — ? PRESCRIPTION MEDICATION MANAGEMENT

## 2024-08-14 RX ORDER — FLUTICASONE PROPIONATE 0.05 MG/G
OINTMENT TOPICAL
Qty: 60 | Refills: 5 | Status: ERX | COMMUNITY
Start: 2024-08-14

## 2024-08-14 RX ORDER — HYDROXYZINE HYDROCHLORIDE 10 MG/1
TABLET, FILM COATED ORAL
Qty: 60 | Refills: 0 | Status: ERX

## 2024-08-14 RX ADMIN — FLUTICASONE PROPIONATE: 0.05 OINTMENT TOPICAL at 00:00

## 2024-08-14 ASSESSMENT — LOCATION SIMPLE DESCRIPTION DERM
LOCATION SIMPLE: RIGHT PRETIBIAL REGION
LOCATION SIMPLE: ABDOMEN
LOCATION SIMPLE: LEFT FOREARM
LOCATION SIMPLE: LEFT PRETIBIAL REGION
LOCATION SIMPLE: LEFT UPPER BACK

## 2024-08-14 ASSESSMENT — LOCATION DETAILED DESCRIPTION DERM
LOCATION DETAILED: PERIUMBILICAL SKIN
LOCATION DETAILED: LEFT PROXIMAL DORSAL FOREARM
LOCATION DETAILED: LEFT SUPERIOR UPPER BACK
LOCATION DETAILED: LEFT PROXIMAL PRETIBIAL REGION
LOCATION DETAILED: RIGHT DISTAL PRETIBIAL REGION

## 2024-08-14 ASSESSMENT — LOCATION ZONE DERM
LOCATION ZONE: ARM
LOCATION ZONE: TRUNK
LOCATION ZONE: LEG

## 2024-08-14 NOTE — PROCEDURE: PRESCRIPTION MEDICATION MANAGEMENT
Detail Level: Zone
Render In Strict Bullet Format?: No
Continue Regimen: Hydroxyzine 10mg\\nClobetasol cream
Initiate Treatment: Fluticasone ointment

## 2024-08-20 SDOH — HEALTH STABILITY: PHYSICAL HEALTH: ON AVERAGE, HOW MANY MINUTES DO YOU ENGAGE IN EXERCISE AT THIS LEVEL?: 30 MIN

## 2024-08-20 SDOH — HEALTH STABILITY: PHYSICAL HEALTH: ON AVERAGE, HOW MANY DAYS PER WEEK DO YOU ENGAGE IN MODERATE TO STRENUOUS EXERCISE (LIKE A BRISK WALK)?: 2 DAYS

## 2024-08-20 ASSESSMENT — PATIENT HEALTH QUESTIONNAIRE - PHQ9
SUM OF ALL RESPONSES TO PHQ9 QUESTIONS 1 & 2: 1
SUM OF ALL RESPONSES TO PHQ QUESTIONS 1-9: 1
2. FEELING DOWN, DEPRESSED OR HOPELESS: SEVERAL DAYS
SUM OF ALL RESPONSES TO PHQ QUESTIONS 1-9: 1
SUM OF ALL RESPONSES TO PHQ QUESTIONS 1-9: 1
1. LITTLE INTEREST OR PLEASURE IN DOING THINGS: NOT AT ALL
SUM OF ALL RESPONSES TO PHQ QUESTIONS 1-9: 1

## 2024-08-20 ASSESSMENT — LIFESTYLE VARIABLES
HOW OFTEN DO YOU HAVE SIX OR MORE DRINKS ON ONE OCCASION: 1
HOW MANY STANDARD DRINKS CONTAINING ALCOHOL DO YOU HAVE ON A TYPICAL DAY: 0
HOW OFTEN DO YOU HAVE A DRINK CONTAINING ALCOHOL: 1
HOW MANY STANDARD DRINKS CONTAINING ALCOHOL DO YOU HAVE ON A TYPICAL DAY: PATIENT DOES NOT DRINK
HOW OFTEN DO YOU HAVE A DRINK CONTAINING ALCOHOL: NEVER

## 2024-08-21 ENCOUNTER — OFFICE VISIT (OUTPATIENT)
Dept: FAMILY MEDICINE CLINIC | Facility: CLINIC | Age: 64
End: 2024-08-21

## 2024-08-21 VITALS
SYSTOLIC BLOOD PRESSURE: 116 MMHG | DIASTOLIC BLOOD PRESSURE: 62 MMHG | HEART RATE: 60 BPM | TEMPERATURE: 97.8 F | WEIGHT: 191 LBS | OXYGEN SATURATION: 98 % | BODY MASS INDEX: 36.09 KG/M2

## 2024-08-21 DIAGNOSIS — Z78.0 POSTMENOPAUSE: ICD-10-CM

## 2024-08-21 DIAGNOSIS — B96.89 ACUTE BACTERIAL SINUSITIS: ICD-10-CM

## 2024-08-21 DIAGNOSIS — Z00.00 ENCOUNTER FOR SUBSEQUENT ANNUAL WELLNESS VISIT (AWV) IN MEDICARE PATIENT: ICD-10-CM

## 2024-08-21 DIAGNOSIS — N30.90 CYSTITIS: ICD-10-CM

## 2024-08-21 DIAGNOSIS — J01.90 ACUTE BACTERIAL SINUSITIS: ICD-10-CM

## 2024-08-21 DIAGNOSIS — F33.0 MAJOR DEPRESSIVE DISORDER, RECURRENT, MILD (HCC): ICD-10-CM

## 2024-08-21 DIAGNOSIS — E11.9 TYPE 2 DIABETES MELLITUS WITHOUT COMPLICATION, WITHOUT LONG-TERM CURRENT USE OF INSULIN (HCC): Primary | ICD-10-CM

## 2024-08-21 DIAGNOSIS — I10 PRIMARY HYPERTENSION: ICD-10-CM

## 2024-08-21 DIAGNOSIS — Z12.39 BREAST SCREENING: ICD-10-CM

## 2024-08-21 DIAGNOSIS — Z23 ENCOUNTER FOR IMMUNIZATION: ICD-10-CM

## 2024-08-21 DIAGNOSIS — J45.909 MILD REACTIVE AIRWAYS DISEASE, UNSPECIFIED WHETHER PERSISTENT: ICD-10-CM

## 2024-08-21 DIAGNOSIS — R35.0 URINARY FREQUENCY: ICD-10-CM

## 2024-08-21 DIAGNOSIS — E66.01 SEVERE OBESITY (BMI 35.0-39.9) WITH COMORBIDITY (HCC): ICD-10-CM

## 2024-08-21 DIAGNOSIS — Z12.11 COLON CANCER SCREENING: ICD-10-CM

## 2024-08-21 DIAGNOSIS — E78.2 MIXED HYPERLIPIDEMIA: ICD-10-CM

## 2024-08-21 LAB
BILIRUBIN, URINE, POC: NEGATIVE
BLOOD URINE, POC: NEGATIVE
GLUCOSE URINE, POC: NEGATIVE
KETONES, URINE, POC: NEGATIVE
LEUKOCYTE ESTERASE, URINE, POC: NORMAL
NITRITE, URINE, POC: NEGATIVE
PH, URINE, POC: 6 (ref 4.6–8)
PROTEIN,URINE, POC: NEGATIVE
SPECIFIC GRAVITY, URINE, POC: 1.02 (ref 1–1.03)
URINALYSIS CLARITY, POC: CLEAR
URINALYSIS COLOR, POC: YELLOW
UROBILINOGEN, POC: NORMAL

## 2024-08-21 RX ORDER — GLIPIZIDE 2.5 MG/1
2.5 TABLET, EXTENDED RELEASE ORAL 2 TIMES DAILY
Qty: 180 TABLET | Refills: 1 | Status: SHIPPED | OUTPATIENT
Start: 2024-08-21

## 2024-08-21 RX ORDER — ZOSTER VACCINE RECOMBINANT, ADJUVANTED 50 MCG/0.5
0.5 KIT INTRAMUSCULAR SEE ADMIN INSTRUCTIONS
Qty: 0.5 ML | Refills: 1 | Status: SHIPPED | OUTPATIENT
Start: 2024-08-21 | End: 2024-08-22

## 2024-08-21 RX ORDER — MONTELUKAST SODIUM 10 MG/1
10 TABLET ORAL NIGHTLY
Qty: 90 TABLET | Refills: 1 | Status: SHIPPED | OUTPATIENT
Start: 2024-08-21

## 2024-08-21 RX ORDER — ALBUTEROL SULFATE 90 UG/1
2 AEROSOL, METERED RESPIRATORY (INHALATION) EVERY 6 HOURS PRN
Qty: 18 G | Refills: 1 | Status: SHIPPED | OUTPATIENT
Start: 2024-08-21

## 2024-08-21 RX ORDER — ATORVASTATIN CALCIUM 10 MG/1
10 TABLET, FILM COATED ORAL DAILY
Qty: 90 TABLET | Refills: 2 | Status: SHIPPED | OUTPATIENT
Start: 2024-08-21

## 2024-08-21 RX ORDER — SEMAGLUTIDE 0.68 MG/ML
INJECTION, SOLUTION SUBCUTANEOUS
Qty: 3 ML | Refills: 0 | Status: SHIPPED | OUTPATIENT
Start: 2024-08-21

## 2024-08-21 RX ORDER — CITALOPRAM 20 MG/1
20 TABLET ORAL
Qty: 135 TABLET | Refills: 1 | Status: SHIPPED | OUTPATIENT
Start: 2024-08-21

## 2024-08-21 RX ORDER — LISINOPRIL 10 MG/1
10 TABLET ORAL
Qty: 90 TABLET | Refills: 0 | Status: SHIPPED | OUTPATIENT
Start: 2024-08-21

## 2024-08-21 RX ORDER — ALBUTEROL SULFATE 2.5 MG/3ML
2.5 SOLUTION RESPIRATORY (INHALATION) EVERY 6 HOURS PRN
Qty: 120 EACH | Refills: 1 | Status: SHIPPED | OUTPATIENT
Start: 2024-08-21

## 2024-08-21 RX ORDER — SEMAGLUTIDE 0.68 MG/ML
0.5 INJECTION, SOLUTION SUBCUTANEOUS WEEKLY
Qty: 3 ML | Refills: 1 | Status: SHIPPED | OUTPATIENT
Start: 2024-09-21

## 2024-08-21 RX ORDER — SEMAGLUTIDE 0.68 MG/ML
INJECTION, SOLUTION SUBCUTANEOUS
Qty: 3 ML | Refills: 1 | Status: SHIPPED | OUTPATIENT
Start: 2024-08-21

## 2024-08-21 RX ORDER — SULFAMETHOXAZOLE AND TRIMETHOPRIM 800; 160 MG/1; MG/1
1 TABLET ORAL 2 TIMES DAILY
Qty: 14 TABLET | Refills: 0 | Status: SHIPPED | OUTPATIENT
Start: 2024-08-21 | End: 2024-08-28

## 2024-08-21 RX ORDER — SEMAGLUTIDE 0.68 MG/ML
INJECTION, SOLUTION SUBCUTANEOUS
Qty: 3 ML | Refills: 1 | Status: CANCELLED | OUTPATIENT
Start: 2024-08-21

## 2024-08-21 ASSESSMENT — PATIENT HEALTH QUESTIONNAIRE - PHQ9
6. FEELING BAD ABOUT YOURSELF - OR THAT YOU ARE A FAILURE OR HAVE LET YOURSELF OR YOUR FAMILY DOWN: SEVERAL DAYS
8. MOVING OR SPEAKING SO SLOWLY THAT OTHER PEOPLE COULD HAVE NOTICED. OR THE OPPOSITE, BEING SO FIGETY OR RESTLESS THAT YOU HAVE BEEN MOVING AROUND A LOT MORE THAN USUAL: NOT AT ALL
SUM OF ALL RESPONSES TO PHQ QUESTIONS 1-9: 4
3. TROUBLE FALLING OR STAYING ASLEEP: SEVERAL DAYS
9. THOUGHTS THAT YOU WOULD BE BETTER OFF DEAD, OR OF HURTING YOURSELF: NOT AT ALL
10. IF YOU CHECKED OFF ANY PROBLEMS, HOW DIFFICULT HAVE THESE PROBLEMS MADE IT FOR YOU TO DO YOUR WORK, TAKE CARE OF THINGS AT HOME, OR GET ALONG WITH OTHER PEOPLE: SOMEWHAT DIFFICULT
7. TROUBLE CONCENTRATING ON THINGS, SUCH AS READING THE NEWSPAPER OR WATCHING TELEVISION: NOT AT ALL
4. FEELING TIRED OR HAVING LITTLE ENERGY: MORE THAN HALF THE DAYS
5. POOR APPETITE OR OVEREATING: NOT AT ALL
SUM OF ALL RESPONSES TO PHQ QUESTIONS 1-9: 4

## 2024-08-21 ASSESSMENT — ENCOUNTER SYMPTOMS
SINUS PRESSURE: 1
GASTROINTESTINAL NEGATIVE: 1
ALLERGIC/IMMUNOLOGIC NEGATIVE: 1
EYES NEGATIVE: 1
SINUS PAIN: 1
RESPIRATORY NEGATIVE: 1

## 2024-08-21 NOTE — TELEPHONE ENCOUNTER
Sent CGM through [facility] for printing and faxing    Thanks

## 2024-08-21 NOTE — TELEPHONE ENCOUNTER
Pharmacy called, Ozempic prescription needs to be due different ones for dosing due to billing ins issues. One Rx for 0.25mg weekly for a month then 0.5mg dose

## 2024-08-21 NOTE — PROGRESS NOTES
108 (90 Base) MCG/ACT inhaler, Inhale 2 puffs into the lungs every 6 hours as needed for Shortness of Breath, Disp: 18 g, Rfl: 1    atorvastatin (LIPITOR) 10 MG tablet, Take 1 tablet by mouth daily, Disp: 90 tablet, Rfl: 2    citalopram (CELEXA) 20 MG tablet, Take 1 tablet by mouth nightly Take 1  1/2 po daily, Disp: 135 tablet, Rfl: 1    glipiZIDE (GLUCOTROL XL) 2.5 MG extended release tablet, Take 1 tablet by mouth in the morning and at bedtime, Disp: 180 tablet, Rfl: 1    lisinopril (PRINIVIL;ZESTRIL) 10 MG tablet, Take 1 tablet by mouth nightly, Disp: 90 tablet, Rfl: 0    montelukast (SINGULAIR) 10 MG tablet, Take 1 tablet by mouth nightly, Disp: 90 tablet, Rfl: 1    sulfamethoxazole-trimethoprim (BACTRIM DS;SEPTRA DS) 800-160 MG per tablet, Take 1 tablet by mouth 2 times daily for 7 days, Disp: 14 tablet, Rfl: 0    triamcinolone (KENALOG) 0.1 % cream, Apply topically 2 times daily for up to two weeks., Disp: 453.6 g, Rfl: 0    omeprazole (PRILOSEC) 20 MG delayed release capsule, Take 1 capsule by mouth daily, Disp: 90 capsule, Rfl: 1    tiZANidine (ZANAFLEX) 4 MG tablet, , Disp: , Rfl:     ipratropium 0.5 mg-albuterol 2.5 mg (DUONEB) 0.5-2.5 (3) MG/3ML SOLN nebulizer solution, USE 3 ML VIA NEBULIZER FOUR TIMES DAILY AS NEEDED FOR SHORTNESS OF BREATH OR WHEEZING, Disp: , Rfl:     clotrimazole-betamethasone (LOTRISONE) 1-0.05 % cream, Apply topically 2 times daily., Disp: 30 g, Rfl: 0    ondansetron (ZOFRAN) 4 MG tablet, Take 1 tablet by mouth every 8 hours as needed for Nausea or Vomiting, Disp: 20 tablet, Rfl: 0    gabapentin (NEURONTIN) 100 MG capsule, Take 1 capsule by mouth nightly., Disp: , Rfl:     fluticasone (FLONASE) 50 MCG/ACT nasal spray, 2 sprays by Nasal route daily, Disp: 16 g, Rfl: 2    budesonide-formoterol (SYMBICORT) 80-4.5 MCG/ACT AERO, Inhale 2 puffs into the lungs 2 times daily, Disp: , Rfl:     vitamin D (CHOLECALCIFEROL) 25 MCG (1000 UT) TABS tablet, Take 1 tablet by mouth daily, Disp: ,

## 2024-08-21 NOTE — PROGRESS NOTES
[facility]  [address]  [place] [address]   (ph) [phone] (fax) [phone]  TAHIR Allen      No chief complaint on file.      HPI    Allergies   Allergen Reactions    Oxycodone-Acetaminophen Rash       Past Medical History:   Diagnosis Date    Arthritis     back, shoulders, hand    Asthma     allergy triggered    Depression     anxiety    Diabetes (HCC)     oral agent, does not check blood sugar, last A1C 6.8    GERD (gastroesophageal reflux disease)     medications prn    Hearing loss 2012    History of degenerative disc disease     Hyperlipidemia     Hypertension        Family History   Problem Relation Age of Onset    Breast Cancer Maternal Grandmother 68    Breast Cancer Maternal Aunt 67    Breast Cancer Other     Diabetes Mother     Stroke Mother     Breast Cancer Mother 65    Cancer Mother     Hearing Loss Mother     Pulmonary Embolism Sister     Diabetes Father     Stroke Father        Social History     Socioeconomic History    Marital status:      Spouse name: Not on file    Number of children: Not on file    Years of education: Not on file    Highest education level: Not on file   Occupational History    Not on file   Tobacco Use    Smoking status: Never    Smokeless tobacco: Never    Tobacco comments:     Quit smokin years ago   Substance and Sexual Activity    Alcohol use: No    Drug use: No    Sexual activity: Yes     Partners: Male   Other Topics Concern    Not on file   Social History Narrative    Not on file     Social Determinants of Health     Financial Resource Strain: Low Risk  (2024)    Overall Financial Resource Strain (CARDIA)     Difficulty of Paying Living Expenses: Not very hard   Food Insecurity: No Food Insecurity (2024)    Hunger Vital Sign     Worried About Running Out of Food in the Last Year: Never true     Ran Out of Food in the Last Year: Never true   Transportation Needs: Unknown

## 2024-08-22 LAB
BACTERIA SPEC CULT: NORMAL
BACTERIA SPEC CULT: NORMAL
SERVICE CMNT-IMP: NORMAL

## 2024-08-29 LAB — NONINV COLON CA DNA+OCC BLD SCRN STL QL: NORMAL

## 2024-08-30 ENCOUNTER — TRANSCRIBE ORDERS (OUTPATIENT)
Dept: SCHEDULING | Age: 64
End: 2024-08-30

## 2024-08-30 DIAGNOSIS — Z12.31 ENCOUNTER FOR SCREENING MAMMOGRAM FOR MALIGNANT NEOPLASM OF BREAST: Primary | ICD-10-CM

## 2024-09-11 LAB — NONINV COLON CA DNA+OCC BLD SCRN STL QL: NEGATIVE

## 2024-10-16 ENCOUNTER — HOSPITAL ENCOUNTER (OUTPATIENT)
Dept: MAMMOGRAPHY | Age: 64
Discharge: HOME | End: 2024-10-19
Payer: MEDICARE

## 2024-10-16 DIAGNOSIS — Z12.31 ENCOUNTER FOR SCREENING MAMMOGRAM FOR MALIGNANT NEOPLASM OF BREAST: ICD-10-CM

## 2024-10-16 DIAGNOSIS — Z78.0 POSTMENOPAUSE: ICD-10-CM

## 2024-10-16 PROCEDURE — 77080 DXA BONE DENSITY AXIAL: CPT

## 2024-10-16 PROCEDURE — 77063 BREAST TOMOSYNTHESIS BI: CPT

## 2025-01-07 ENCOUNTER — OFFICE VISIT (OUTPATIENT)
Dept: FAMILY MEDICINE CLINIC | Facility: CLINIC | Age: 65
End: 2025-01-07
Payer: MEDICARE

## 2025-01-07 ENCOUNTER — HOSPITAL ENCOUNTER (OUTPATIENT)
Dept: GENERAL RADIOLOGY | Age: 65
Discharge: HOME | End: 2025-01-10
Payer: MEDICARE

## 2025-01-07 VITALS
DIASTOLIC BLOOD PRESSURE: 78 MMHG | HEART RATE: 62 BPM | HEIGHT: 61 IN | RESPIRATION RATE: 16 BRPM | TEMPERATURE: 98.4 F | BODY MASS INDEX: 36.06 KG/M2 | WEIGHT: 191 LBS | OXYGEN SATURATION: 95 % | SYSTOLIC BLOOD PRESSURE: 134 MMHG

## 2025-01-07 DIAGNOSIS — R05.9 COUGH, UNSPECIFIED TYPE: ICD-10-CM

## 2025-01-07 DIAGNOSIS — R05.1 ACUTE COUGH: ICD-10-CM

## 2025-01-07 DIAGNOSIS — R05.1 ACUTE COUGH: Primary | ICD-10-CM

## 2025-01-07 DIAGNOSIS — J45.909 MILD REACTIVE AIRWAYS DISEASE, UNSPECIFIED WHETHER PERSISTENT: ICD-10-CM

## 2025-01-07 PROCEDURE — 1090F PRES/ABSN URINE INCON ASSESS: CPT | Performed by: PHYSICIAN ASSISTANT

## 2025-01-07 PROCEDURE — 3017F COLORECTAL CA SCREEN DOC REV: CPT | Performed by: PHYSICIAN ASSISTANT

## 2025-01-07 PROCEDURE — G8399 PT W/DXA RESULTS DOCUMENT: HCPCS | Performed by: PHYSICIAN ASSISTANT

## 2025-01-07 PROCEDURE — M1308 PR FLU IMMUNIZE NO ADMIN: HCPCS | Performed by: PHYSICIAN ASSISTANT

## 2025-01-07 PROCEDURE — 99213 OFFICE O/P EST LOW 20 MIN: CPT | Performed by: PHYSICIAN ASSISTANT

## 2025-01-07 PROCEDURE — 1036F TOBACCO NON-USER: CPT | Performed by: PHYSICIAN ASSISTANT

## 2025-01-07 PROCEDURE — 1123F ACP DISCUSS/DSCN MKR DOCD: CPT | Performed by: PHYSICIAN ASSISTANT

## 2025-01-07 PROCEDURE — G8417 CALC BMI ABV UP PARAM F/U: HCPCS | Performed by: PHYSICIAN ASSISTANT

## 2025-01-07 PROCEDURE — G8427 DOCREV CUR MEDS BY ELIG CLIN: HCPCS | Performed by: PHYSICIAN ASSISTANT

## 2025-01-07 PROCEDURE — 71046 X-RAY EXAM CHEST 2 VIEWS: CPT

## 2025-01-07 RX ORDER — CETIRIZINE HYDROCHLORIDE 10 MG/1
10 TABLET ORAL DAILY
Qty: 30 TABLET | Refills: 0 | Status: SHIPPED | OUTPATIENT
Start: 2025-01-07 | End: 2025-01-21

## 2025-01-07 RX ORDER — ALBUTEROL SULFATE 90 UG/1
2 INHALANT RESPIRATORY (INHALATION) EVERY 6 HOURS PRN
Qty: 18 G | Refills: 0 | Status: SHIPPED | OUTPATIENT
Start: 2025-01-07

## 2025-01-07 RX ORDER — GUAIFENESIN 600 MG/1
1200 TABLET, EXTENDED RELEASE ORAL 2 TIMES DAILY
Qty: 40 TABLET | Refills: 0 | Status: SHIPPED | OUTPATIENT
Start: 2025-01-07 | End: 2025-01-17

## 2025-01-07 ASSESSMENT — PATIENT HEALTH QUESTIONNAIRE - PHQ9
7. TROUBLE CONCENTRATING ON THINGS, SUCH AS READING THE NEWSPAPER OR WATCHING TELEVISION: NOT AT ALL
10. IF YOU CHECKED OFF ANY PROBLEMS, HOW DIFFICULT HAVE THESE PROBLEMS MADE IT FOR YOU TO DO YOUR WORK, TAKE CARE OF THINGS AT HOME, OR GET ALONG WITH OTHER PEOPLE: SOMEWHAT DIFFICULT
SUM OF ALL RESPONSES TO PHQ QUESTIONS 1-9: 5
5. POOR APPETITE OR OVEREATING: NOT AT ALL
1. LITTLE INTEREST OR PLEASURE IN DOING THINGS: NOT AT ALL
6. FEELING BAD ABOUT YOURSELF - OR THAT YOU ARE A FAILURE OR HAVE LET YOURSELF OR YOUR FAMILY DOWN: NOT AT ALL
SUM OF ALL RESPONSES TO PHQ QUESTIONS 1-9: 5
SUM OF ALL RESPONSES TO PHQ9 QUESTIONS 1 & 2: 1
9. THOUGHTS THAT YOU WOULD BE BETTER OFF DEAD, OR OF HURTING YOURSELF: NOT AT ALL
2. FEELING DOWN, DEPRESSED OR HOPELESS: SEVERAL DAYS
SUM OF ALL RESPONSES TO PHQ QUESTIONS 1-9: 5
SUM OF ALL RESPONSES TO PHQ QUESTIONS 1-9: 5
8. MOVING OR SPEAKING SO SLOWLY THAT OTHER PEOPLE COULD HAVE NOTICED. OR THE OPPOSITE, BEING SO FIGETY OR RESTLESS THAT YOU HAVE BEEN MOVING AROUND A LOT MORE THAN USUAL: NOT AT ALL
4. FEELING TIRED OR HAVING LITTLE ENERGY: MORE THAN HALF THE DAYS
3. TROUBLE FALLING OR STAYING ASLEEP: MORE THAN HALF THE DAYS

## 2025-01-07 NOTE — PROGRESS NOTES
Sunshine Basilio PA-C, [facility], MPH  [facility]  [address], [address]  [place] [address]  Phone [phone]  SUBJECTIVE  Chief Complaint   Patient presents with    Cough     Went to urgent care was given Augmentin and tessalon Perles     Congestion     HPI   Lesly Diego is a 64 y.o. female with PMH of T2DM, MDD, s/p bariatric surgery, chronic low back pain, and HLD who presents today for acute visit.    Pt complains of post-nasal drip x 3-4 weeks. States this has been making her cough \"all the time.\" Has occasional runny nose. Throat is sometimes sore from the drainage. States her appetite is variable. She is able to eat soft food and hold down liquids.     Pt reports she was seen at [facility] on 24, and was diagnosed with a sinus infection. States she was treated with Augmenting and Tessalon Perles and a nasal spray. States she completed the Augmentin course which was 5 or 7 days long. States the Tessalon does not work.     The cough was initially dry but then became productive after she had been on the abx for 3 days. Cough is productive of green phlegm. Denies hemoptysis. Denies SOB unless she is having a coughing spell. States she is sore in her R lower ribs which she attributes to her coughing. Admits sinus pressure (b/l frontal) and states she has had chronic sinusitis since she was a child.    She is on Flonase 2 sprays nightly at baseline. States her grandkids, with whom she lives, \"always have runny noses.\" States she only has asthma if it is triggered by her allergies. She is a nonsmoker.       Review of Systems -- denies fever, chills. Admits nausea at times which she attributes to copious post-nasal drip. States she sometimes has post-tussive emesis. Denies chest pain or pressure. States she rarely has wheezing. States her inhaler is  and has not been helpful. Denies dizziness, presyncope, diarrhea    Allergies   Allergen Reactions

## 2025-01-08 ENCOUNTER — TELEPHONE (OUTPATIENT)
Dept: FAMILY MEDICINE CLINIC | Facility: CLINIC | Age: 65
End: 2025-01-08

## 2025-01-08 NOTE — TELEPHONE ENCOUNTER
Please notify pt that her chest x-ray was clear. She does not have evidence of pneumonia, fluid in her lungs, or other abnormalities. No change to plan as discussed at her office visit yesterday.    Pt shouldreturn to clinic or seek medical care elsewhere if symptoms worsen, change, or fail to improve.

## 2025-02-27 ENCOUNTER — OFFICE VISIT (OUTPATIENT)
Dept: FAMILY MEDICINE CLINIC | Facility: CLINIC | Age: 65
End: 2025-02-27
Payer: MEDICARE

## 2025-02-27 VITALS
DIASTOLIC BLOOD PRESSURE: 83 MMHG | WEIGHT: 191 LBS | SYSTOLIC BLOOD PRESSURE: 134 MMHG | HEART RATE: 71 BPM | RESPIRATION RATE: 12 BRPM | HEIGHT: 61 IN | BODY MASS INDEX: 36.06 KG/M2 | TEMPERATURE: 98.5 F | OXYGEN SATURATION: 95 %

## 2025-02-27 DIAGNOSIS — J01.41 ACUTE RECURRENT PANSINUSITIS: Primary | ICD-10-CM

## 2025-02-27 DIAGNOSIS — R05.1 ACUTE COUGH: ICD-10-CM

## 2025-02-27 LAB
EXP DATE SOLUTION: NORMAL
EXP DATE SWAB: NORMAL
EXPIRATION DATE: NORMAL
LOT NUMBER POC: NORMAL
LOT NUMBER SOLUTION: NORMAL
LOT NUMBER SWAB: NORMAL
QUICKVUE INFLUENZA TEST: NEGATIVE
SARS-COV-2 RNA, POC: NEGATIVE
VALID INTERNAL CONTROL, POC: NORMAL

## 2025-02-27 PROCEDURE — G8399 PT W/DXA RESULTS DOCUMENT: HCPCS | Performed by: PHYSICIAN ASSISTANT

## 2025-02-27 PROCEDURE — 87804 INFLUENZA ASSAY W/OPTIC: CPT | Performed by: PHYSICIAN ASSISTANT

## 2025-02-27 PROCEDURE — G8417 CALC BMI ABV UP PARAM F/U: HCPCS | Performed by: PHYSICIAN ASSISTANT

## 2025-02-27 PROCEDURE — 99213 OFFICE O/P EST LOW 20 MIN: CPT | Performed by: PHYSICIAN ASSISTANT

## 2025-02-27 PROCEDURE — 1123F ACP DISCUSS/DSCN MKR DOCD: CPT | Performed by: PHYSICIAN ASSISTANT

## 2025-02-27 PROCEDURE — G8427 DOCREV CUR MEDS BY ELIG CLIN: HCPCS | Performed by: PHYSICIAN ASSISTANT

## 2025-02-27 PROCEDURE — 1090F PRES/ABSN URINE INCON ASSESS: CPT | Performed by: PHYSICIAN ASSISTANT

## 2025-02-27 PROCEDURE — 1036F TOBACCO NON-USER: CPT | Performed by: PHYSICIAN ASSISTANT

## 2025-02-27 PROCEDURE — 3017F COLORECTAL CA SCREEN DOC REV: CPT | Performed by: PHYSICIAN ASSISTANT

## 2025-02-27 PROCEDURE — 87635 SARS-COV-2 COVID-19 AMP PRB: CPT | Performed by: PHYSICIAN ASSISTANT

## 2025-02-27 RX ORDER — PREDNISONE 10 MG/1
TABLET ORAL
Qty: 13 TABLET | Refills: 0 | Status: SHIPPED | OUTPATIENT
Start: 2025-02-27 | End: 2025-03-06

## 2025-02-27 SDOH — ECONOMIC STABILITY: FOOD INSECURITY: WITHIN THE PAST 12 MONTHS, THE FOOD YOU BOUGHT JUST DIDN'T LAST AND YOU DIDN'T HAVE MONEY TO GET MORE.: PATIENT DECLINED

## 2025-02-27 SDOH — ECONOMIC STABILITY: FOOD INSECURITY: WITHIN THE PAST 12 MONTHS, YOU WORRIED THAT YOUR FOOD WOULD RUN OUT BEFORE YOU GOT MONEY TO BUY MORE.: PATIENT DECLINED

## 2025-02-27 NOTE — PROGRESS NOTES
COV  -     AMB POC RAPID INFLUENZA TEST  -     predniSONE (DELTASONE) 10 MG tablet; Take 3 tablets by mouth every morning for 2 days, THEN 2 tablets every morning for 2 days, THEN 1 tablet every morning for 2 days, THEN 0.5 tablets every morning for 2 days., Disp-13 tablet, R-0Normal     Rapid flu and covid= all negative     Summary:    Acute sinusitis  Augmentin BID x 1 week  Continue Flonase, mucinex, and saline nasal spray  Tylenol PRN   Cough-- Lungs CTAB, VSS. May improve with decrease post-nasal drip  Albuterol PRN cough/wheezing  Steroid taper-- Pt agrees to monitor her BGL and notify us if it is running >250. Take steroids with food.     Pt advised to return to clinic or seek medical care elsewhere if symptoms worsen, change, or fail to improve.      Next appointment at [facility]: Visit date not found     Follow-up and Dispositions    Return in about 4 weeks (around 3/27/2025) for Follow-up with PCP.     ZACHARY Retana

## (undated) DEVICE — STERILE HOOK LOCK LATEX FREE ELASTIC BANDAGE 4INX5YD: Brand: HOOK LOCK™

## (undated) DEVICE — NEEDLE HYPO 25GA L1.5IN BLU POLYPR HUB S STL REG BVL STR

## (undated) DEVICE — C-ARM: Brand: UNBRANDED

## (undated) DEVICE — PREP SKN CHLRAPRP APL 26ML STR --

## (undated) DEVICE — DRAPE,HAND,STERILE: Brand: MEDLINE

## (undated) DEVICE — TUBING, SUCTION, 1/4" X 10', STRAIGHT: Brand: MEDLINE

## (undated) DEVICE — ULTRAGUIDE CTR

## (undated) DEVICE — BNDG ELAS ESMARK 4INX12FT LF -- STRL

## (undated) DEVICE — SLING ARM AD ULT

## (undated) DEVICE — DERMABOND SKIN ADH 0.7ML -- DERMABOND ADVANCED 12/BX

## (undated) DEVICE — DISPOSABLE BIPOLAR CODE, 12' (3.66 M): Brand: CONMED

## (undated) DEVICE — SOLUTION IRRIG 1000ML 0.9% SOD CHL USP POUR PLAS BTL

## (undated) DEVICE — HAND PACK: Brand: MEDLINE INDUSTRIES, INC.

## (undated) DEVICE — PADDING CAST W4INXL4YD ST COT COHESIVE HND TEARABLE SPEC

## (undated) DEVICE — DRAPE XR C ARM 41X74IN LF --

## (undated) DEVICE — DRAPE, FILM SHEET, 44X65 STERILE: Brand: MEDLINE

## (undated) DEVICE — SYR 10ML LUER LOK 1/5ML GRAD --

## (undated) DEVICE — BNDG,ELSTC,MATRIX,STRL,4"X5YD,LF,HOOK&LP: Brand: MEDLINE

## (undated) DEVICE — BLADE OPHTH DIA4MM MINI MENIS FLAT ARTHRO LOK

## (undated) DEVICE — ZIMMER® STERILE DISPOSABLE TOURNIQUET CUFF WITH PLC, DUAL PORT, SINGLE BLADDER, 18 IN. (46 CM)

## (undated) DEVICE — PADDING CAST W3INXL4YD COT BLEND MIC PLEAT UNDERCAST SPEC

## (undated) DEVICE — SUTURE VCRL SZ 3-0 L27IN ABSRB UD L26MM SH 1/2 CIR J416H

## (undated) DEVICE — PADDING CAST COHESIVE 4 YDX3 IN HND TEARABLE COTTON SPEC 100

## (undated) DEVICE — STERILE POLYISOPRENE POWDER-FREE SURGICAL GLOVES: Brand: PROTEXIS

## (undated) DEVICE — Device

## (undated) DEVICE — COTTON UNDERCAST PADDING,REGULAR FINISH: Brand: WEBRIL

## (undated) DEVICE — SUTURE STRATAFIX SPRL MCRYL + SZ 4-0 L12IN ABSRB UD PS-2 SXMP1B117

## (undated) DEVICE — SOLUTION IV 1000ML 0.9% SOD CHL

## (undated) DEVICE — SUT ETHLN 3-0 18IN PS2 BLK --

## (undated) DEVICE — STRIP,CLOSURE,WOUND,MEDI-STRIP,1/2X4: Brand: MEDLINE

## (undated) DEVICE — SUTURE VCRL RAPIDE SZ 4-0 L18IN ABSRB UD PS-3 L13MM 3/8 CIR VR494

## (undated) DEVICE — ADAPTER MON OD15X22MM ID15MM STD LUER FIT W/ LIFESAVER

## (undated) DEVICE — STERILE HOOK LOCK LATEX FREE ELASTIC BANDAGE 2INX5YD: Brand: HOOK LOCK™

## (undated) DEVICE — AMD ANTIMICROBIAL GAUZE SPONGES,12 PLY USP TYPE VII, 0.2% POLYHEXAMETHYLENE BIGUANIDE HCI (PHMB): Brand: CURITY

## (undated) DEVICE — GLOVE ORANGE PI 7 1/2   MSG9075

## (undated) DEVICE — 1.8MM DRILL BIT WITH DEPTH MARK/QC/110MM

## (undated) DEVICE — (D)PREP SKN CHLRAPRP APPL 26ML -- CONVERT TO ITEM 371833

## (undated) DEVICE — DRESSING,GAUZE,XEROFORM,CURAD,1"X8",ST: Brand: CURAD

## (undated) DEVICE — PADDING CAST W4INXL4YD NONSTERILE COT COHESIVE HND TEARABLE

## (undated) DEVICE — PRECISION THIN, OFFSET (5.5 X 0.38 X 25.0MM)

## (undated) DEVICE — SYRINGE EAR 2OZ ULC SLIMMER TIP FLAT BTM SUCT PWR DISP FOR

## (undated) DEVICE — BANDAGE,ELASTIC,ESMARK,STERILE,4"X9',LF: Brand: MEDLINE

## (undated) DEVICE — SPLINT ORTH W5XL30IN PLSTR OF PARIS LO EXOTHERM SMOOTH

## (undated) DEVICE — SUTURE NONABSORBABLE MONOFILAMENT 4-0 PS-2 18 IN BLU PROLENE 8682H

## (undated) DEVICE — SUTURE FIBERWIRE SZ 2 W/ TAPERED NEEDLE BLUE L38IN NONABSORB BLU L26.5MM 1/2 CIRCLE AR7200

## (undated) DEVICE — BLADE OPHTH GRN ROUNDED TIP 1 SIDE SHRP GRINDLESS MINI-BLDE

## (undated) DEVICE — BNDG,ELSTC,MATRIX,STRL,2"X5YD,LF,HOOK&LP: Brand: MEDLINE

## (undated) DEVICE — 2000CC GUARDIAN II: Brand: GUARDIAN

## (undated) DEVICE — BNDG,ELSTC,MATRIX,STRL,6"X5YD,LF,HOOK&LP: Brand: MEDLINE

## (undated) DEVICE — SUTURE VCRL SZ 4-0 L27IN ABSRB UD L19MM PS-2 3/8 CIR PRIM J426H

## (undated) DEVICE — SURGICAL PROCEDURE PACK BASIC ST FRANCIS

## (undated) DEVICE — SPLINT ORTH W3XL15IN PLSTR OF PARIS LO EXOTHERM SMOOTH

## (undated) DEVICE — SUTURE FIB SZ 0 L38IN NONABS BL L22.2MM 1/2 CIRC DIA POINT AR7251